# Patient Record
Sex: FEMALE | Race: WHITE | URBAN - METROPOLITAN AREA
[De-identification: names, ages, dates, MRNs, and addresses within clinical notes are randomized per-mention and may not be internally consistent; named-entity substitution may affect disease eponyms.]

---

## 2017-01-01 ENCOUNTER — TELEPHONE (OUTPATIENT)
Dept: GERIATRICS | Facility: CLINIC | Age: 82
End: 2017-01-01

## 2017-01-01 ENCOUNTER — NURSING HOME VISIT (OUTPATIENT)
Dept: GERIATRICS | Facility: CLINIC | Age: 82
End: 2017-01-01
Payer: COMMERCIAL

## 2017-01-01 ENCOUNTER — TRANSFERRED RECORDS (OUTPATIENT)
Dept: HEALTH INFORMATION MANAGEMENT | Facility: CLINIC | Age: 82
End: 2017-01-01

## 2017-01-01 VITALS
TEMPERATURE: 98.1 F | WEIGHT: 122.2 LBS | OXYGEN SATURATION: 96 % | DIASTOLIC BLOOD PRESSURE: 89 MMHG | SYSTOLIC BLOOD PRESSURE: 179 MMHG | HEART RATE: 74 BPM | BODY MASS INDEX: 21.65 KG/M2 | RESPIRATION RATE: 18 BRPM

## 2017-01-01 DIAGNOSIS — K92.2 UPPER GI BLEED: ICD-10-CM

## 2017-01-01 DIAGNOSIS — N18.4 CKD (CHRONIC KIDNEY DISEASE) STAGE 4, GFR 15-29 ML/MIN (H): ICD-10-CM

## 2017-01-01 DIAGNOSIS — F02.80 LATE ONSET ALZHEIMER'S DISEASE WITHOUT BEHAVIORAL DISTURBANCE (H): ICD-10-CM

## 2017-01-01 DIAGNOSIS — I10 BENIGN ESSENTIAL HYPERTENSION: ICD-10-CM

## 2017-01-01 DIAGNOSIS — E03.4 HYPOTHYROIDISM DUE TO ACQUIRED ATROPHY OF THYROID: Primary | ICD-10-CM

## 2017-01-01 DIAGNOSIS — G30.1 LATE ONSET ALZHEIMER'S DISEASE WITHOUT BEHAVIORAL DISTURBANCE (H): ICD-10-CM

## 2017-01-01 LAB
ANION GAP SERPL CALCULATED.3IONS-SCNC: 7 MMOL/L (ref 5–18)
BUN SERPL-MCNC: 40 MG/DL (ref 8–28)
CALCIUM SERPL-MCNC: 9.5 MG/DL (ref 8.5–10.5)
CHLORIDE SERPLBLD-SCNC: 105 MMOL/L (ref 98–107)
CO2 SERPL-SCNC: 25 MMOL/L (ref 22–31)
CREAT SERPL-MCNC: 1.68 MG/DL (ref 0.6–1.1)
GFR SERPL CREATININE-BSD FRML MDRD: 28 ML/MIN/1.73M2
GLUCOSE SERPL-MCNC: 112 MG/DL (ref 70–125)
POTASSIUM SERPL-SCNC: 4 MMOL/L (ref 3.5–5)
SODIUM SERPL-SCNC: 137 MMOL/L (ref 136–145)

## 2017-01-01 PROCEDURE — 99309 SBSQ NF CARE MODERATE MDM 30: CPT | Performed by: INTERNAL MEDICINE

## 2017-01-01 RX ORDER — FEXOFENADINE HCL 180 MG/1
180 TABLET ORAL EVERY 24 HOURS
COMMUNITY
End: 2018-01-01

## 2017-01-01 RX ORDER — PANTOPRAZOLE SODIUM 20 MG/1
20 TABLET, DELAYED RELEASE ORAL DAILY
COMMUNITY
End: 2018-01-01

## 2017-03-09 ENCOUNTER — TRANSFERRED RECORDS (OUTPATIENT)
Dept: HEALTH INFORMATION MANAGEMENT | Facility: CLINIC | Age: 82
End: 2017-03-09

## 2017-03-13 ENCOUNTER — TRANSFERRED RECORDS (OUTPATIENT)
Dept: FAMILY MEDICINE | Facility: CLINIC | Age: 82
End: 2017-03-13

## 2017-03-21 ENCOUNTER — HOSPITAL ENCOUNTER (INPATIENT)
Facility: CLINIC | Age: 82
LOS: 3 days | Discharge: NURSING, REHAB OR LONG-TERM CARE | DRG: 377 | End: 2017-03-24
Attending: EMERGENCY MEDICINE | Admitting: INTERNAL MEDICINE
Payer: MEDICARE

## 2017-03-21 ENCOUNTER — APPOINTMENT (OUTPATIENT)
Dept: CT IMAGING | Facility: CLINIC | Age: 82
DRG: 377 | End: 2017-03-21
Attending: EMERGENCY MEDICINE
Payer: MEDICARE

## 2017-03-21 ENCOUNTER — APPOINTMENT (OUTPATIENT)
Dept: GENERAL RADIOLOGY | Facility: CLINIC | Age: 82
DRG: 377 | End: 2017-03-21
Attending: EMERGENCY MEDICINE
Payer: MEDICARE

## 2017-03-21 DIAGNOSIS — K92.2 UPPER GI BLEED: Primary | ICD-10-CM

## 2017-03-21 DIAGNOSIS — K92.2 GI BLEED: ICD-10-CM

## 2017-03-21 LAB
ABO + RH BLD: NORMAL
ABO + RH BLD: NORMAL
ALBUMIN SERPL-MCNC: 2.6 G/DL (ref 3.4–5)
ALBUMIN UR-MCNC: 30 MG/DL
ALP SERPL-CCNC: 58 U/L (ref 40–150)
ALT SERPL W P-5'-P-CCNC: 11 U/L (ref 0–50)
ANION GAP SERPL CALCULATED.3IONS-SCNC: 11 MMOL/L (ref 3–14)
APPEARANCE UR: CLEAR
AST SERPL W P-5'-P-CCNC: 12 U/L (ref 0–45)
BASOPHILS # BLD AUTO: 0.1 10E9/L (ref 0–0.2)
BASOPHILS NFR BLD AUTO: 1 %
BILIRUB SERPL-MCNC: 0.6 MG/DL (ref 0.2–1.3)
BILIRUB UR QL STRIP: NEGATIVE
BLD GP AB SCN SERPL QL: NORMAL
BLD PROD TYP BPU: NORMAL
BLD PROD TYP BPU: NORMAL
BLD UNIT ID BPU: 0
BLOOD BANK CMNT PATIENT-IMP: NORMAL
BLOOD PRODUCT CODE: NORMAL
BPU ID: NORMAL
BUN SERPL-MCNC: 56 MG/DL (ref 7–30)
CALCIUM SERPL-MCNC: 7.8 MG/DL (ref 8.5–10.1)
CHLORIDE SERPL-SCNC: 104 MMOL/L (ref 94–109)
CO2 SERPL-SCNC: 21 MMOL/L (ref 20–32)
COLOR UR AUTO: YELLOW
CREAT SERPL-MCNC: 1.8 MG/DL (ref 0.52–1.04)
DIFFERENTIAL METHOD BLD: ABNORMAL
EOSINOPHIL # BLD AUTO: 0.2 10E9/L (ref 0–0.7)
EOSINOPHIL NFR BLD AUTO: 2.5 %
ERYTHROCYTE [DISTWIDTH] IN BLOOD BY AUTOMATED COUNT: 15.1 % (ref 10–15)
GFR SERPL CREATININE-BSD FRML MDRD: 26 ML/MIN/1.7M2
GLUCOSE SERPL-MCNC: 156 MG/DL (ref 70–99)
GLUCOSE UR STRIP-MCNC: NEGATIVE MG/DL
HCT VFR BLD AUTO: 22.6 % (ref 35–47)
HEMOCCULT STL QL: POSITIVE
HGB BLD-MCNC: 7.6 G/DL (ref 11.7–15.7)
HGB BLD-MCNC: 7.7 G/DL (ref 11.7–15.7)
HGB BLD-MCNC: 9.2 G/DL (ref 11.7–15.7)
HGB UR QL STRIP: NEGATIVE
IMM GRANULOCYTES # BLD: 0 10E9/L (ref 0–0.4)
IMM GRANULOCYTES NFR BLD: 0.3 %
INR PPP: 1.05 (ref 0.86–1.14)
INTERPRETATION ECG - MUSE: NORMAL
KETONES UR STRIP-MCNC: NEGATIVE MG/DL
LACTATE BLD-SCNC: 1 MMOL/L (ref 0.7–2.1)
LACTATE SERPL-SCNC: 2.7 MMOL/L (ref 0.4–2)
LEUKOCYTE ESTERASE UR QL STRIP: NEGATIVE
LYMPHOCYTES # BLD AUTO: 2.4 10E9/L (ref 0.8–5.3)
LYMPHOCYTES NFR BLD AUTO: 29.9 %
MCH RBC QN AUTO: 31.1 PG (ref 26.5–33)
MCHC RBC AUTO-ENTMCNC: 33.6 G/DL (ref 31.5–36.5)
MCV RBC AUTO: 93 FL (ref 78–100)
MONOCYTES # BLD AUTO: 0.9 10E9/L (ref 0–1.3)
MONOCYTES NFR BLD AUTO: 10.6 %
MUCOUS THREADS #/AREA URNS LPF: PRESENT /LPF
NEUTROPHILS # BLD AUTO: 4.5 10E9/L (ref 1.6–8.3)
NEUTROPHILS NFR BLD AUTO: 55.7 %
NITRATE UR QL: NEGATIVE
NRBC # BLD AUTO: 0 10*3/UL
NRBC BLD AUTO-RTO: 0 /100
NUM BPU REQUESTED: 1
PH UR STRIP: 6 PH (ref 5–7)
PLATELET # BLD AUTO: 260 10E9/L (ref 150–450)
POTASSIUM SERPL-SCNC: 4 MMOL/L (ref 3.4–5.3)
PROT SERPL-MCNC: 5.3 G/DL (ref 6.8–8.8)
RBC # BLD AUTO: 2.44 10E12/L (ref 3.8–5.2)
RBC #/AREA URNS AUTO: 1 /HPF (ref 0–2)
SODIUM SERPL-SCNC: 136 MMOL/L (ref 133–144)
SP GR UR STRIP: 1.01 (ref 1–1.03)
SPECIMEN EXP DATE BLD: NORMAL
TRANSFUSION STATUS PATIENT QL: NORMAL
TRANSFUSION STATUS PATIENT QL: NORMAL
TROPONIN I SERPL-MCNC: NORMAL UG/L (ref 0–0.04)
URN SPEC COLLECT METH UR: ABNORMAL
UROBILINOGEN UR STRIP-MCNC: NORMAL MG/DL (ref 0–2)
WBC # BLD AUTO: 8 10E9/L (ref 4–11)
WBC #/AREA URNS AUTO: 1 /HPF (ref 0–2)

## 2017-03-21 PROCEDURE — 25000125 ZZHC RX 250: Performed by: INTERNAL MEDICINE

## 2017-03-21 PROCEDURE — 85018 HEMOGLOBIN: CPT | Performed by: INTERNAL MEDICINE

## 2017-03-21 PROCEDURE — 99223 1ST HOSP IP/OBS HIGH 75: CPT | Mod: AI | Performed by: INTERNAL MEDICINE

## 2017-03-21 PROCEDURE — 96361 HYDRATE IV INFUSION ADD-ON: CPT

## 2017-03-21 PROCEDURE — 86900 BLOOD TYPING SEROLOGIC ABO: CPT | Performed by: EMERGENCY MEDICINE

## 2017-03-21 PROCEDURE — 83605 ASSAY OF LACTIC ACID: CPT | Performed by: INTERNAL MEDICINE

## 2017-03-21 PROCEDURE — 81001 URINALYSIS AUTO W/SCOPE: CPT | Performed by: EMERGENCY MEDICINE

## 2017-03-21 PROCEDURE — 70450 CT HEAD/BRAIN W/O DYE: CPT

## 2017-03-21 PROCEDURE — 86923 COMPATIBILITY TEST ELECTRIC: CPT | Performed by: EMERGENCY MEDICINE

## 2017-03-21 PROCEDURE — 86850 RBC ANTIBODY SCREEN: CPT | Performed by: EMERGENCY MEDICINE

## 2017-03-21 PROCEDURE — 36415 COLL VENOUS BLD VENIPUNCTURE: CPT | Performed by: INTERNAL MEDICINE

## 2017-03-21 PROCEDURE — 85025 COMPLETE CBC W/AUTO DIFF WBC: CPT | Performed by: EMERGENCY MEDICINE

## 2017-03-21 PROCEDURE — 85610 PROTHROMBIN TIME: CPT | Performed by: EMERGENCY MEDICINE

## 2017-03-21 PROCEDURE — 74176 CT ABD & PELVIS W/O CONTRAST: CPT

## 2017-03-21 PROCEDURE — 25000128 H RX IP 250 OP 636: Performed by: EMERGENCY MEDICINE

## 2017-03-21 PROCEDURE — 71010 XR CHEST PORT 1 VW: CPT

## 2017-03-21 PROCEDURE — 80053 COMPREHEN METABOLIC PANEL: CPT | Performed by: EMERGENCY MEDICINE

## 2017-03-21 PROCEDURE — 82272 OCCULT BLD FECES 1-3 TESTS: CPT | Performed by: EMERGENCY MEDICINE

## 2017-03-21 PROCEDURE — 96374 THER/PROPH/DIAG INJ IV PUSH: CPT

## 2017-03-21 PROCEDURE — 86901 BLOOD TYPING SEROLOGIC RH(D): CPT | Performed by: EMERGENCY MEDICINE

## 2017-03-21 PROCEDURE — 12000000 ZZH R&B MED SURG/OB

## 2017-03-21 PROCEDURE — 83605 ASSAY OF LACTIC ACID: CPT | Performed by: EMERGENCY MEDICINE

## 2017-03-21 PROCEDURE — P9016 RBC LEUKOCYTES REDUCED: HCPCS | Performed by: EMERGENCY MEDICINE

## 2017-03-21 PROCEDURE — 84484 ASSAY OF TROPONIN QUANT: CPT | Performed by: EMERGENCY MEDICINE

## 2017-03-21 PROCEDURE — 25000128 H RX IP 250 OP 636: Performed by: INTERNAL MEDICINE

## 2017-03-21 PROCEDURE — 93005 ELECTROCARDIOGRAM TRACING: CPT

## 2017-03-21 PROCEDURE — S0164 INJECTION PANTROPRAZOLE: HCPCS | Performed by: EMERGENCY MEDICINE

## 2017-03-21 PROCEDURE — 76705 ECHO EXAM OF ABDOMEN: CPT

## 2017-03-21 PROCEDURE — 25000125 ZZHC RX 250: Performed by: EMERGENCY MEDICINE

## 2017-03-21 PROCEDURE — 99285 EMERGENCY DEPT VISIT HI MDM: CPT | Mod: 25

## 2017-03-21 RX ORDER — LABETALOL HYDROCHLORIDE 5 MG/ML
10 INJECTION, SOLUTION INTRAVENOUS
Status: DISCONTINUED | OUTPATIENT
Start: 2017-03-21 | End: 2017-03-24 | Stop reason: HOSPADM

## 2017-03-21 RX ORDER — SODIUM CHLORIDE 9 MG/ML
INJECTION, SOLUTION INTRAVENOUS CONTINUOUS
Status: DISCONTINUED | OUTPATIENT
Start: 2017-03-21 | End: 2017-03-24 | Stop reason: HOSPADM

## 2017-03-21 RX ORDER — SODIUM CHLORIDE 9 MG/ML
1000 INJECTION, SOLUTION INTRAVENOUS CONTINUOUS
Status: DISCONTINUED | OUTPATIENT
Start: 2017-03-21 | End: 2017-03-21

## 2017-03-21 RX ORDER — NALOXONE HYDROCHLORIDE 0.4 MG/ML
.1-.4 INJECTION, SOLUTION INTRAMUSCULAR; INTRAVENOUS; SUBCUTANEOUS
Status: DISCONTINUED | OUTPATIENT
Start: 2017-03-21 | End: 2017-03-24 | Stop reason: HOSPADM

## 2017-03-21 RX ORDER — PROCHLORPERAZINE MALEATE 5 MG
5 TABLET ORAL EVERY 6 HOURS PRN
Status: DISCONTINUED | OUTPATIENT
Start: 2017-03-21 | End: 2017-03-24 | Stop reason: HOSPADM

## 2017-03-21 RX ORDER — HYDRALAZINE HYDROCHLORIDE 20 MG/ML
10 INJECTION INTRAMUSCULAR; INTRAVENOUS EVERY 4 HOURS PRN
Status: DISCONTINUED | OUTPATIENT
Start: 2017-03-21 | End: 2017-03-24 | Stop reason: HOSPADM

## 2017-03-21 RX ORDER — PROCHLORPERAZINE 25 MG
12.5 SUPPOSITORY, RECTAL RECTAL EVERY 12 HOURS PRN
Status: DISCONTINUED | OUTPATIENT
Start: 2017-03-21 | End: 2017-03-24 | Stop reason: HOSPADM

## 2017-03-21 RX ORDER — DOCUSATE SODIUM 100 MG/1
100 CAPSULE, LIQUID FILLED ORAL 2 TIMES DAILY
Status: DISCONTINUED | OUTPATIENT
Start: 2017-03-21 | End: 2017-03-24 | Stop reason: HOSPADM

## 2017-03-21 RX ORDER — BRIMONIDINE TARTRATE AND TIMOLOL MALEATE 2; 5 MG/ML; MG/ML
1 SOLUTION OPHTHALMIC 2 TIMES DAILY
Status: DISCONTINUED | OUTPATIENT
Start: 2017-03-21 | End: 2017-03-24 | Stop reason: HOSPADM

## 2017-03-21 RX ORDER — BRINZOLAMIDE 10 MG/ML
1 SUSPENSION/ DROPS OPHTHALMIC 2 TIMES DAILY
Status: DISCONTINUED | OUTPATIENT
Start: 2017-03-21 | End: 2017-03-24 | Stop reason: HOSPADM

## 2017-03-21 RX ORDER — TRAVOPROST OPHTHALMIC SOLUTION 0.04 MG/ML
1 SOLUTION OPHTHALMIC AT BEDTIME
Status: DISCONTINUED | OUTPATIENT
Start: 2017-03-21 | End: 2017-03-24 | Stop reason: HOSPADM

## 2017-03-21 RX ADMIN — SODIUM CHLORIDE 1000 ML: 9 INJECTION, SOLUTION INTRAVENOUS at 08:30

## 2017-03-21 RX ADMIN — SODIUM CHLORIDE 80 MG: 9 INJECTION, SOLUTION INTRAVENOUS at 09:53

## 2017-03-21 RX ADMIN — BRIMONIDINE TARTRATE AND TIMOLOL MALEATE 1 DROP: 2; 5 SOLUTION OPHTHALMIC at 21:49

## 2017-03-21 RX ADMIN — SODIUM CHLORIDE 8 MG/HR: 9 INJECTION, SOLUTION INTRAVENOUS at 09:57

## 2017-03-21 RX ADMIN — SODIUM CHLORIDE: 9 INJECTION, SOLUTION INTRAVENOUS at 12:29

## 2017-03-21 RX ADMIN — SODIUM CHLORIDE 1000 ML: 9 INJECTION, SOLUTION INTRAVENOUS at 09:10

## 2017-03-21 RX ADMIN — LABETALOL HYDROCHLORIDE 10 MG: 5 INJECTION, SOLUTION INTRAVENOUS at 17:45

## 2017-03-21 RX ADMIN — PANTOPRAZOLE SODIUM 40 MG: 40 INJECTION, POWDER, FOR SOLUTION INTRAVENOUS at 13:09

## 2017-03-21 RX ADMIN — TRAVOPROST OPHTHALMIC SOLUTION 1 DROP: 0.04 SOLUTION OPHTHALMIC at 21:26

## 2017-03-21 RX ADMIN — BRINZOLAMIDE 1 DROP: 10 SUSPENSION/ DROPS OPHTHALMIC at 21:31

## 2017-03-21 NOTE — IP AVS SNAPSHOT
[address] [facility]    [address] [place] [address]    Phone:  [phone]                                       After Visit Summary   3/21/2017    Milagros Marie    MRN: [id number]           After Visit Summary Signature Page     I have received my discharge instructions, and my questions have been answered. I have discussed any challenges I see with this plan with the nurse or doctor.    ..........................................................................................................................................  Patient/Patient Representative Signature      ..........................................................................................................................................  Patient Representative Print Name and Relationship to Patient    ..................................................               ................................................  Date                                            Time    ..........................................................................................................................................  Reviewed by Signature/Title    ...................................................              ..............................................  Date                                                            Time

## 2017-03-21 NOTE — IP AVS SNAPSHOT
"` `     [facility] [place] [address] [facility]: [phone]                                              INTERAGENCY TRANSFER FORM - NURSING   3/21/2017                    Hospital Admission Date: 3/21/2017  CHELLY RENAE   : 1923  Sex: Female        Attending Provider: Pb Woody MD     Allergies:  Sulfa Drugs    Infection:  None   Service:  HOSPITALIST    Ht:  1.6 m (5' 3\")   Wt:  56.3 kg (124 lb 3.2 oz)   Admission Wt:  56.3 kg (124 lb 3.2 oz)    BMI:  22 kg/m 2   BSA:  1.58 m 2            Patient PCP Information     Provider PCP Type    Maria Eugenia Holley MD General      Current Code Status     Date Active Code Status Order ID Comments User Context       3/21/2017 11:50 AM DNR/DNI [id number]  Pb Woody MD Inpatient       Code Status History     Date Active Date Inactive Code Status Order ID Comments User Context    This patient has a current code status but no historical code status.      Advance Directives        Does patient have a scanned Advance Directive/ACP document in EPIC?           Yes        Hospital Problems as of 3/24/2017              Priority Class Noted POA    Upper GI bleed Medium  3/21/2017 Yes      Non-Hospital Problems as of 3/24/2017              Priority Class Noted    Preventative health care   2011    Fatigue   2011    Osteopenia   2011    ACP (advance care planning)   Unknown    Renal failure   Unknown    Health Care Home   2013    Advanced directives, counseling/discussion   2015    Hypothyroidism due to acquired atrophy of thyroid Medium  10/6/2016    Benign essential hypertension Medium  10/6/2016      Immunizations     Name Date      Pneumococcal (PCV 13) 08/28/15     Pneumococcal 23 valent 00     TD (ADULT, 7+) 10/11/07          END      ASSESSMENT     Discharge Profile Flowsheet     EXPECTED DISCHARGE     Passing flatus  yes 17 0015    Expected Discharge Date  17 (Ann/Mana Joseph at 1530) 17 " "1113   COMMUNICATION ASSESSMENT      DISCHARGE NEEDS ASSESSMENT     Patient's communication style  spoken language (English or Bilingual) 03/21/17 0812    Concerns To Be Addressed  discharge planning concerns 03/23/17 0831   FINAL RESOURCES      Patient/family verbalizes understanding of discharge plan recommendations?  Yes 03/24/17 1113   Resources List  Transitional Care 03/24/17 1113    Medical Team notified of plan?  yes 03/24/17 1113   Other Resources  Day care;County Worker (Every Wednesday) 11/25/16 1948    Equipment Currently Used at Home  grab bar;shower chair;walker, rolling 03/22/17 1412   Transitional Care  -- ([facility]) 03/24/17 1113    Transportation Available  family or friend will provide 03/22/17 1412   PAS Number  [id number] 03/24/17 1113    # of Referrals Placed by CTS  Senior Linkage Line;Transportation 03/24/17 1113   Senior Linkage Line Referral Placed  03/23/17 03/24/17 1113    ASSESSMENT OF FUNCTIONAL STATUS     Referrals Placed  Post Acute Facilities;Senior Linkage Line;Transportation 03/24/17 1113    Assesssment of Functional Status  Needs placement in a SNF/TCF for rehabilitation 03/23/17 0831   SKIN      GASTROINTESTINAL (ADULT,PEDIATRIC,OB)     Inspection  Full 03/24/17 0906    GI WDL  WDL 03/24/17 0906   Skin WDL  ex 03/24/17 0906    All Quadrants Palpation  soft/nontender 03/24/17 0015   Skin Color/Characteristics  bruised (ecchymotic) 03/24/17 0906    Last Bowel Movement  03/22/17 03/23/17 1632   SAFETY      GI Signs/Symptoms  abdominal discomfort 03/24/17 0015   Safety WDL  WDL 03/24/17 0906                 Assessment WDL (Within Defined Limits) Definitions           Safety WDL     Effective: 09/28/15    Row Information: <b>WDL Definition:</b> Bed in low position, wheels locked; call light in reach; upper side rails up x 2; ID band on<br> <font color=\"gray\"><i>Item=AS safety wdl>>List=AS safety wdl>>Version=F14</i></font>      Skin WDL     Effective: 09/28/15    Row " "Information: <b>WDL Definition:</b> Warm; dry; intact; elastic; without discoloration; pressure points without redness<br> <font color=\"gray\"><i>Item=AS skin wdl>>List=AS skin wdl>>Version=F14</i></font>      Vitals     Vital Signs Flowsheet     VITAL SIGNS     Head of Bed (HOB)  HOB flat 03/23/17 2020    Temp  98.2  F (36.8  C) 03/24/17 0753   Body Position  other (see comments) (sitting) 03/24/17 0855    Temp src  Oral 03/24/17 0753   Chair  Upright in chair 03/24/17 0855    Resp  18 03/24/17 0753   Positioning/Transfer Devices  pillows 03/24/17 0855    Pulse  83 03/21/17 1536   DAILY CARE      Heart Rate  80 03/24/17 0753   Activity Level of Assistance  assistance, 1 person 03/24/17 0855    Pulse/Heart Rate Source  Monitor 03/23/17 2347   Activity Type  ambulated to bathroom;ambulated in room 03/24/17 0855    BP  139/80 03/24/17 0753   Activity Assistive Device  walker;gait belt 03/24/17 0855    BP Location  Left arm 03/23/17 2347   ECG      OXYGEN THERAPY     ECG Rhythm  Sinus rhythm 03/21/17 0844    SpO2  98 % 03/24/17 0753   Ectopy  None 03/21/17 0844    O2 Device  None (Room air) 03/24/17 0753   Lead Monitored  Lead II 03/21/17 0844    PAIN/COMFORT     EKG MONITORING      Patient Currently in Pain  denies 03/24/17 0855   Cardiac Regularity  Regular 03/21/17 0824    0-10 Pain Scale  0 03/21/17 0817   Cardiac Rhythm  Other (Comment) (sinus rhythm) 03/21/17 0824    HEIGHT AND WEIGHT     ALEJANDRO COMA SCALE      Height  1.6 m (5' 3\") 03/21/17 1149   Best Eye Response  4-->(E4) spontaneous 03/24/17 0906    Weight  56.3 kg (124 lb 3.2 oz) 03/21/17 1149   Best Motor Response  6-->(M6) obeys commands 03/24/17 0906    BSA (Calculated - sq m)  1.58 03/21/17 1149   Best Verbal Response  5-->(V5) oriented 03/24/17 0906    BMI (Calculated)  22.05 03/21/17 1149   [place] Coma Scale Score  15 03/24/17 0906    POSITIONING                   Patient Lines/Drains/Airways Status    Active LINES/DRAINS/AIRWAYS     Name: Placement " date: Placement time: Site: Days: Last dressing change:    Peripheral IV 03/23/17 Right;Posterior Upper arm 03/23/17   1440   Upper arm   less than 1             Patient Lines/Drains/Airways Status    Active PICC/CVC     None            Intake/Output Detail Report     Date Intake       Output Net    Shift P.O. I.V. IV Piggyback Blood Components Total Urine Total       Noc 03/22/17 2300 - 03/23/17 0659 100 800 -- -- 900 -- -- 900    Day 03/23/17 0700 - 03/23/17 1459 -- -- -- -- -- -- -- 0    Chloe 03/23/17 1500 - 03/23/17 2259 -- -- -- -- -- -- -- 0    Noc 03/23/17 2300 - 03/24/17 0659 -- -- -- -- -- -- -- 0    Day 03/24/17 0700 - 03/24/17 1459 -- -- -- -- -- -- -- 0      Last Void/BM       Most Recent Value    Urine Occurrence 1 at 03/24/2017 1245    Stool Occurrence 1 at 03/22/2017 0000      Case Management/Discharge Planning     Case Management/Discharge Planning Flowsheet     REFERRAL INFORMATION     COPING/STRESS CAREGIVER      Did the Initial Social Work Assessment result in a Social Work Case?  Yes 03/23/17 0831   Major Change/Loss/Stressor  none 03/23/17 0831    Admission Type  inpatient 03/23/17 0831   Primary Caregiver Strengths  expressive of needs;motivated;positive attitude;successful coping history 03/23/17 0831    Arrived From  home or self-care 03/23/17 0831   Sources Of Support  friend(s);other family members 03/23/17 0831    Referral Source  interdisciplinary rounds 03/23/17 0831   Reaction To Health Status  accepting 03/23/17 0831    # of Referrals Placed by [facility]  Senior Linkage Line;Transportation 03/24/17 1113   Understanding Of Condition And Treatment  adequate understanding of medical condition;adequate understanding of treatment 03/23/17 0831    Reason For Consult  discharge planning 03/23/17 0831   EXPECTED DISCHARGE      Record Reviewed  history and physical;medical record 03/23/17 0831   Expected Discharge Date  03/24/17 (Ann/Mana Joseph at 1530) 03/24/17 1113     Assigned to  Case  Tracy Doty, KLAUS 03/24/17 1113   ASSESSMENT/CONCERNS TO BE ADDRESSED      Primary Care MD Name  Maria Eugenia Holley MD 03/23/17 0831   Concerns To Be Addressed  discharge planning concerns 03/23/17 0831    LIVING ENVIRONMENT     DISCHARGE PLANNING      Lives With  child(agustin), adult 03/23/17 0831   Patient/family verbalizes understanding of discharge plan recommendations?  Yes 03/24/17 1113    Living Arrangements  condominium 03/23/17 0831   Medical Team notified of plan?  yes 03/24/17 1113    Provides Primary Care For  no one 03/23/17 0831   Transportation Available  family or friend will provide 03/22/17 1412    Quality Of Family Relationships  supportive;involved 03/23/17 0831   FINAL NOTE      Able to Return to Prior Living Arrangements  no 03/23/17 0831   Final Note  D/C to Ann/Mana Joseph on 3-24-17 via w/c at 1530 03/24/17 1113    HOME SAFETY     FINAL RESOURCES      Patient Feels Safe Living in Home?  yes 03/23/17 0831   Equipment Currently Used at Home  grab bar;shower chair;walker, rolling 03/22/17 1412    ASSESSMENT OF FAMILY/SOCIAL SUPPORT     Resources List  Transitional Care 03/24/17 1113    Who is your support system?  Children 03/23/17 0831   Other Resources  Day care;County Worker (Every Wednesday) 11/25/16 1948    Description of Support System  Supportive;Involved 03/23/17 0831   Transitional Care  -- (Ann/Mana Joseph) 03/24/17 1113    Support Assessment  Adequate family and caregiver support;Adequate social supports 03/23/17 0831   PAS Number  [id number] 03/24/17 1113    Quality of Family Relationships  supportive;involved 03/23/17 0831   Senior Linkage Line Referral Placed  03/23/17 03/24/17 1113    ASSESSMENT OF FUNCTIONAL STATUS     Referrals Placed  Post Acute Facilities;Senior Linkage Line;Transportation 03/24/17 1113    Assesssment of Functional Status  Needs placement in a SNF/TCF for rehabilitation 03/23/17 0831   ABUSE RISK SCREEN      EMPLOYMENT     QUESTION TO PATIENT:  Has a  member of your family or a partner(now or in the past) intimidated, hurt, manipulated, or controlled you in any way?  patient declined to answer or is unable to answer 03/21/17 0817    Do you work full or part-time?  no 03/23/17 0831   QUESTION TO PATIENT: Do you feel safe going back to the place where you are living?  patient declined to answer or is unable to answer 03/21/17 0817    COPING/STRESS     OBSERVATION: Is there reason to believe there has been maltreatment of a vulnerable adult (ie. Physical/Sexual/Emotional abuse, self neglect, lack of adequate food, shelter, medical care, or financial exploitation)?  no 03/21/17 0817    Major Change/Loss/Stressor  hospitalization 03/23/17 0831   (R) MENTAL HEALTH SUICIDE RISK      Patient Personal Strengths  strong support system 03/23/17 0831   Are you depressed or being treated for depression?  No 03/21/17 1221    Sources Of Support  adult child(agustin);friend(s);other family members 03/23/17 0831   HOMICIDE RISK      Reaction To Health Status  unable to assess 03/23/17 0831   Homicidal Ideation  no 03/21/17 0817    Understanding Of Condition And Treatment  unable to assess 03/23/17 0831

## 2017-03-21 NOTE — ED PROVIDER NOTES
History     Chief Complaint:  Syncope & Rectal Bleeding    HPI   HPI is limited secondary to the patient's history of dementia. History is supplemented by EMS and the patient's daughter.     Milagros Marie is a 89 year old female with a history of dementia, homocystinemia and renal failure who presents to the emergency department today via EMS and her daughter for evaluation of syncope and rectal bleeding this morning. EMS states that the patient felt fine yesterday, and was ambulating with her walker and acting like herself per the patient's daughter. EMS states that the patient got up this morning at the private residence that she resides at with her daughter, and was found on the ground between the toilet and bathtub by her daughter, prompting 911 to be called. EMS notes that upon arrival, the patient had a decreased level of consciousness and was unable to follow many commands. The patient was noted to have vital signs within normal limits, including a blood pressure of 114/66 and a pulse in the upper 60s-mid 70s alongside a normal blood sugar and 12-Lead. EMS mentions that there was bloody diarrhea found in the toilet, which was made this morning. The patient complains of abdominal pain according to EMS, but upon evaluation in the ED, the patient has no complaints of pain. Moreover, the patient states that she became incontinent of her urine. The patient denies chest pain and shortness of breath.       Allergies:  Sulfa Drugs      Medications:    Levothyroxine (synthroid/levothroid) 50 mcg tablet  Oxycodone (roxicodone) 5 mg immediate release tablet  Amlodipine besylate po  Loteprednol etabonate 0.5 % gel  Metoprolol (lopressor) 50 mg tablet  Azopt 1 % ophthalmic suspension  Cephalexin (keflex) 500 mg capsule  Irbesartan (avapro) 300 mg tablet  Probiotic product (advanced probiotic) caps  Aspirin 81 mg chewable tablet  Acetaminophen (acetaminophen extra strength) 500 mg tablet  Combigan 0.2-0.5 %  ophthalmic solution  Fexofenadine hcl (allegra po)  Travoprost z, benzalkonium, (travatan) 0.004 % ophthalmic solution  Magnesium 500 mg caps  Calcium carbonate-vitamin d (calcium 600 + d or)     Past Medical History:    ACP (advance care planning)   DJD (degenerative joint disease)   Essential hypertension, benign   Glaucoma   Homocysteinemia (H)   HTN (hypertension)   Osteopenia   Reflux   Renal failure   Stenosis   Stress incontinence, female   Unspecified hypothyroidism   Dementia     Past Surgical History:    Joint replacemtn, knee rt/lt   Hysterectomy   Carpal tunnel release rt/lt   Cataract iol, rt/lt   Blepharoplasty   Eye surgery   Bladder surgery     Family History:    Heart disease: mother, father   Colorectal cancer: sister   Cancer: sister     Social History:  The patient was accompanied to the ED by EMS and daughter.  Smoking Status: never  Smokeless Tobacco: never  Alcohol Use: positive  Marital Status:   [2]     Review of Systems   Unable to perform ROS: Dementia     Physical Exam   Vitals:  Patient Vitals for the past 24 hrs:   BP Temp Temp src Heart Rate Resp SpO2   03/21/17 1000 148/69 - - 79 - 98 %   03/21/17 0959 145/64 - - 71 - 99 %   03/21/17 0845 132/76 - - 76 10 99 %   03/21/17 0830 123/61 - - 77 17 99 %   03/21/17 0826 - - - 77 27 99 %   03/21/17 0825 121/69 - - - - -   03/21/17 0815 116/65 97.6  F (36.4  C) Oral 81 16 99 %        Physical Exam  General: Appears well-developed and well-nourished.   Head: No signs of trauma.   Mouth/Throat: Oropharynx is clear and moist.   Eyes: Conjunctivae are normal. Pupils are equal, round, and reactive to light.   Neck: Normal range of motion. No nuchal rigidity. No cervical adenopathy  CV: Normal rate and regular rhythm.    Resp: Effort normal and breath sounds normal. No respiratory distress.   GI: Soft. There is no tenderness or guarding.  Normal bowel sounds.  No CVA tenderness.  Rectal:  Melanotic stool present.  MSK: Normal range of motion.  no edema. No Calf tenderness.  Neuro: The patient is alert and oriented to person, place.  Poor historian, which daughter confirms dementia.  PERRLA, EOMI, strength in upper/lower extremities generally weak but symmetrical. Sensation normal. Speech normal.  Skin: Skin is warm and dry. No rash noted.   Psych: normal mood and affect. behavior is normal.     Emergency Department Course     ECG:  ECG taken at 0816, ECG read at 0816  Normal sinus rhythm   Left axis deviation   Incomplete right bundle branch block   Left ventricular hypertrophy with repolarization abnormality   Cannot rule out septal infarct, age undetermined   Abnormal ECG  Rate 79 bpm. IN interval 176. QRS duration 106. QT/QTc 396/454. P-R-T axes 21 -35 81.      Imaging:  Radiology findings were communicated with the patient who voiced understanding of the findings.    Head CT w/o contrast  IMPRESSION: Chronic changes. No evidence for intracranial hemorrhage  or any acute process.  Reading per radiology    Abd/pelvis CT no contrast - Stone Protocol  IMPRESSION:  1. No convincing acute abnormality is seen.  2. Stool distends the colon.  3. Cholelithiasis.  4. Moderate hiatal hernia is more distended than in 2009.  5. An indeterminate new hyperdense medial right renal lesion is  subcentimeter. This may just be a small hemorrhagic cyst. A solid  lesion is not completely excluded.  6. Nonspecific mild subcutaneous edema diffusely.  7. Diffuse vascular calcifications.  Reading per radiology    Chest  XR, 1 view PORTABLE  IMPRESSION: Moderate sized esophageal hiatal hernia. Minimal  interstitial infiltrate left lung base which may be acute or chronic.  No other findings.  Reading per radiology    POC US ABDOMEN LIMITED  [facility] Procedure Note      Limited Bedside ED Aorta Ultrasound:    PROCEDURE: PERFORMED BY: Dr. Nickolas Looney  INDICATIONS:  Abdominal Pain    PROBE:  Low frequency convex probe  BODY LOCATION: Abdomen  FINDINGS:  The  ultrasound was performed using transverse views.   Normal: Abdominal aorta < 4 cm.  MEASUREMENT:  2.6 cm   No evidence of free fluid in hepatorenal (Morison s pouch), perisplenic, or and pelvic areas. No evidence of pericardial effusion.  INTERPRETATION:  The evaluation of the aorta was of normal caliber (ie < 4cm in the transverse/longitudinal views) without evidence of aneurysm or dilation.  Aorta visualized in entirety from insertion into the intra-abdominal cavity to bifurcation into iliac vessels. There was no abdominal free fluid.  IMAGE DOCUMENTATION: Images were archived to PACs system.      Laboratory:  Laboratory findings were communicated with the patient who voiced understanding of the findings.    Occult blood stool: positive    CBC: HGB 7.6 (L) o/w WNL. (WBC 8.0, )   CMP: Creatinine 1.80 (H), glucose 156 (H), BUN 56 (H), GFR estimate 26 (L), calcium 7.8 (L), albumin 2.6 (L), protein total 5.3 (L) o/w WNL.   Troponin (Collected 0819): <0.015  INR: 1.05  ABO/Rh type and screen: O positive, negative for antibody   Lactic Acid (Collected at 0819): 2.7 (H)    UA: protein albumin urine 30 (A), mucous urine present (A) o/w WNL.       Interventions:  0830: NS 1000 mL IV   0830: NaCl infusion 1000 mL IV   0910: NS 1000 mL IV   0957: Protonix 8 mg/hr IV   0953: Protonix 80 mg IV        Emergency Department Course:  0811: The patient arrives via EMS.   0812: I performed an exam of the patient as documented above.   0816: EKG obtained in the ED, see results above.   0817: I spoke with the patient's daughter. The patient is confirmed to be DNR/DNI.  0819: IV was inserted and blood was drawn for laboratory testing, results above.  0830: I performed a bedside ultrasound on the patient as documented above.   The patient was sent for a CT head, XR chest, CT abdomen pelvis while in the emergency department, results above.   The patient provided a urine sample here in the emergency department. This was sent for  laboratory testing, findings above.  The patient provided a stool sample here in the emergency department. This was sent for laboratory testing, findings above.  1013: I spoke with Dr. Woody of the Hospitalist service from [facility] regarding patient's presentation, findings, and plan of care.  At 1015 the patient and her daughter was rechecked and was updated on the results of her laboratory and imaging studies.   I discussed the treatment plan with the patient. They expressed understanding of this plan and consented to admission. I discussed the patient with Dr. Woody, who will admit the patient to a monitored bed for further evaluation and treatment.  I personally reviewed the laboratory and imaging results with the Patient and daughter and answered all related questions prior to admission.    Impression & Plan      Medical Decision Making:  Milagros Marie is a 89 year old female who presents to the emergency department today after collapsing in the bathroom. She lives independently with her daughter, who heard her in the bathroom, went to check on her and found her between the toilet and the bath tub. EMS noted bloody stool in the toilet. Upon my evaluation, the patient denies specific complaints. She does have dementia and is a fairly poor historian overall. At certain times while obtaining the history she would complain of abdominal pain but primarily did not. On her exam, her abdomen was soft without any apparent tenderness. She did have obvious melena. The remainder of her exam was non-focal. Blood work was obtained which did show anemia. She has a history of CKD and her creatinine was near baseline. I did start her on a Protonix drip given the concern for GI bleed. I did not initiate transfusion given that her vital signs were stable and her hemoglobin was above 7. The patient remained stable throughout her ED course and will be admitted to the hospitalist service for further monitoring,  treatment and evaluation.       Diagnosis:    ICD-10-CM    1. GI bleed K92.2        Disposition:   The patient is admitted to the hospital.       Scribe Disclosure:  I, Andres Olmos, am serving as a scribe at 8:13 AM on 3/21/2017 to document services personally performed by Nickolas Looney MD, based on my observations and the provider's statements to me.    3/21/2017    EMERGENCY DEPARTMENT       Nickolas Looney MD  03/23/17 1700

## 2017-03-21 NOTE — PROVIDER NOTIFICATION
"Spoke to DR. Woody regarding this morning Lactic result, MD states: \"this possibly not r/t SIRS. But could be the GI bleed.\" we will recheck again today for follow up.   "

## 2017-03-21 NOTE — PHARMACY-ADMISSION MEDICATION HISTORY
Admission medication history interview status for the 3/21/2017  admission is complete. See EPIC admission navigator for prior to admission medications     Medication history source reliability:Good    Actions taken by pharmacist (provider contacted, etc):None     Additional medication history information not noted on PTA med list :None    Medication reconciliation/reorder completed by provider prior to medication history? yes    Time spent in this activity: 15 min    Prior to Admission medications    Medication Sig Last Dose Taking? Auth Provider   levothyroxine (SYNTHROID/LEVOTHROID) 50 MCG tablet TAKE 1 TABLET (50 MCG) BY MOUTH DAILY 3/20/2017 at am Yes Maria Eugenia Holley MD   AMLODIPINE BESYLATE PO Take 10 mg by mouth daily 3/20/2017 at 1200 Yes Unknown, Entered By History   metoprolol (LOPRESSOR) 50 MG tablet TAKE 1 AND 1/2 TABLET TWICE DAILY WITH FOOD OR MILK 3/20/2017 at pm Yes Monty Lloyd MD   AZOPT 1 % ophthalmic suspension Place 1 drop Into the left eye 2 times daily  3/20/2017 at pm Yes Reported, Patient   irbesartan (AVAPRO) 300 MG tablet Take 0.5 tablets (150 mg) by mouth 2 times daily 3/20/2017 at pm Yes Andrea Bennett MD   Probiotic Product (ADVANCED PROBIOTIC) CAPS Take 1 tablet by mouth daily (with lunch)  3/20/2017 at 1200 Yes Janice Castillo MD   aspirin 81 MG chewable tablet Take 1 tablet (81 mg) by mouth daily 3/20/2017 at pm Yes Janice Castillo MD   acetaminophen (ACETAMINOPHEN EXTRA STRENGTH) 500 MG tablet Take 500 mg by mouth 3 times daily  3/20/2017 at Unknown time Yes Janice Castillo MD   COMBIGAN 0.2-0.5 % ophthalmic solution Place 1 drop Into the left eye 2 times daily 3/20/2017 at pm Yes Reported, Patient   Fexofenadine HCl (ALLEGRA PO) Take 180 mg by mouth daily.  3/20/2017 at pm Yes Reported, Patient   travoprost Z, benzalkonium, (TRAVATAN) 0.004 % ophthalmic solution 1 drop. One drop in left eye at bedtime.  3/20/2017 at 2200  Yes Reported, Patient   Magnesium 500 MG CAPS Take 1 tablet by mouth daily Daughter does not know salt form 3/20/2017 at Unknown time Yes Reported, Patient   Calcium Carbonate-Vitamin D (CALCIUM 600 + D OR) Take 1 tablet by mouth daily  3/20/2017 at Unknown time Yes Reported, Patient   cephALEXin (KEFLEX) 500 MG capsule Take 2,000 mg by mouth as needed Administer one hour prior to dental procedure   Reported, Patient

## 2017-03-21 NOTE — IP AVS SNAPSHOT
"          [place] [address] [facility]: [phone]                                              INTERAGENCY TRANSFER FORM - LAB / IMAGING / EKG / EMG RESULTS   3/21/2017                    Hospital Admission Date: 3/21/2017  CHELLY RENAE   : 1923  Sex: Female        Attending Provider: Pb Woody MD     Allergies:  Sulfa Drugs    Infection:  None   Service:  HOSPITALIST    Ht:  1.6 m (5' 3\")   Wt:  56.3 kg (124 lb 3.2 oz)   Admission Wt:  56.3 kg (124 lb 3.2 oz)    BMI:  22 kg/m 2   BSA:  1.58 m 2            Patient PCP Information     Provider PCP Type    Maria Eugenia Holley MD General         Lab Results - 3 Days      Hemoglobin [[id number]] (Abnormal)  Resulted: 17 0616, Result status: Final result    Ordering provider: Alex Moses MD  17 0001 Resulting lab: [facility]    Specimen Information    Type Source Collected On   Blood  17 0558          Components       Value Reference Range Flag Lab   Hemoglobin 7.3 11.7 - 15.7 g/dL L FrStHsLb            Hemoglobin [[id number]] (Abnormal)  Resulted: 17, Result status: Final result    Ordering provider: Alex Moses MD  17 1520 Resulting lab: [facility]    Specimen Information    Type Source Collected On   Blood  17 2000          Components       Value Reference Range Flag Lab   Hemoglobin 7.9 11.7 - 15.7 g/dL L FrStHsLb            Basic metabolic panel [[id number]] (Abnormal)  Resulted: 17 0824, Result status: Final result    Ordering provider: Pb Woody MD  17 0001 Resulting lab: [facility]    Specimen Information    Type Source Collected On   Blood  17 0746          Components       Value Reference Range Flag Lab   Sodium 141 133 - 144 mmol/L  FrStHsLb   Potassium 3.8 3.4 - 5.3 mmol/L  FrStHsLb   Chloride 112 94 - 109 mmol/L H FrStHsLb   Carbon Dioxide 21 20 - 32 mmol/L  FrStHsLb   Anion Gap 8 3 - 14 " mmol/L  FrStHsLb   Glucose 93 70 - 99 mg/dL  FrStHsLb   Urea Nitrogen 56 7 - 30 mg/dL H FrStHsLb   Creatinine 1.56 0.52 - 1.04 mg/dL H FrStHsLb   GFR Estimate 31 >60 mL/min/1.7m2 L FrStHsLb   Comment:  Non  GFR Calc   GFR Estimate If Black 37 >60 mL/min/1.7m2 L FrStHsLb   Comment:  African American GFR Calc   Calcium 8.0 8.5 - 10.1 mg/dL L FrStHsLb            CBC with platelets [[id number]] (Abnormal)  Resulted: 03/22/17 0809, Result status: Final result    Ordering provider: Pb Woody MD  03/22/17 0001 Resulting lab: [facility]    Specimen Information    Type Source Collected On   Blood  03/22/17 0746          Components       Value Reference Range Flag Lab   WBC 6.1 4.0 - 11.0 10e9/L  FrStHsLb   RBC Count 2.32 3.8 - 5.2 10e12/L L FrStHsLb   Hemoglobin 7.4 11.7 - 15.7 g/dL L FrStHsLb   Hematocrit 21.6 35.0 - 47.0 % L FrStHsLb   MCV 93 78 - 100 fl  FrStHsLb   MCH 31.9 26.5 - 33.0 pg  FrStHsLb   MCHC 34.3 31.5 - 36.5 g/dL  FrStHsLb   RDW 15.5 10.0 - 15.0 % H FrStHsLb   Platelet Count 188 150 - 450 10e9/L  FrStHsLb            Hemoglobin [[id number]] (Abnormal)  Resulted: 03/22/17 0211, Result status: Final result    Ordering provider: Pb Woody MD  03/21/17 2000 Resulting lab: [facility]    Specimen Information    Type Source Collected On   Blood  03/22/17 0205          Components       Value Reference Range Flag Lab   Hemoglobin 7.9 11.7 - 15.7 g/dL L FrStHsLb            Hemoglobin [[id number]] (Abnormal)  Resulted: 03/21/17 2329, Result status: Final result    Ordering provider: Pb Woody MD  03/21/17 1600 Resulting lab: [facility]    Specimen Information    Type Source Collected On   Blood  03/21/17 8525          Components       Value Reference Range Flag Lab   Hemoglobin 7.7 11.7 - 15.7 g/dL L FrStHsLb            Lactic acid whole blood [[id number]]  Resulted: 03/21/17 1724, Result status: Final result    Ordering  provider: Pb Woody MD  03/21/17 1534 Resulting lab: [facility]    Specimen Information    Type Source Collected On   Blood  03/21/17 1700          Components       Value Reference Range Flag Lab   Lactic Acid 1.0 0.7 - 2.1 mmol/L  FrStHsLb            Hemoglobin [[id number]] (Abnormal)  Resulted: 03/21/17 1720, Result status: Final result    Ordering provider: Pb Woody MD  03/21/17 1150 Resulting lab: [facility]    Specimen Information    Type Source Collected On   Blood  03/21/17 1700          Components       Value Reference Range Flag Lab   Hemoglobin 9.2 11.7 - 15.7 g/dL L FrStHsLb            UA with Microscopic [[id number]] (Abnormal)  Resulted: 03/21/17 0903, Result status: Final result    Ordering provider: Nickolas Looney MD  03/21/17 0824 Resulting lab: [facility]    Specimen Information    Type Source Collected On   Urine Urine catheter 03/21/17 0855          Components       Value Reference Range Flag Lab   Color Urine Yellow   FrStHsLb   Appearance Urine Clear   FrStHsLb   Glucose Urine Negative NEG mg/dL  FrStHsLb   Bilirubin Urine Negative NEG  FrStHsLb   Ketones Urine Negative NEG mg/dL  FrStHsLb   Specific [place] Urine 1.012 1.003 - 1.035  FrStHsLb   Blood Urine Negative NEG  FrStHsLb   pH Urine 6.0 5.0 - 7.0 pH  FrStHsLb   Protein Albumin Urine 30 NEG mg/dL A FrStHsLb   Urobilinogen mg/dL Normal 0.0 - 2.0 mg/dL  FrStHsLb   Nitrite Urine Negative NEG  FrStHsLb   Leukocyte Esterase Urine Negative NEG  FrStHsLb   Source Catheterized Urine   FrStHsLb   WBC Urine 1 0 - 2 /HPF  FrStHsLb   RBC Urine 1 0 - 2 /HPF  FrStHsLb   Mucous Urine Present NEG /LPF A FrStHsLb            Troponin I [[id number]]  Resulted: 03/21/17 0854, Result status: Final result    Ordering provider: Nickolas Looney MD  03/21/17 0821 Resulting lab: [facility]    Specimen Information    Type Source Collected On   Blood  03/21/17 0819           Components       Value Reference Range Flag Lab   Troponin I ES -- 0.000 - 0.045 ug/L  FrStHsLb   Result:         <0.015  The 99th percentile for upper reference range is 0.045 ug/L.  Troponin values in   the range of 0.045 - 0.120 ug/L may be associated with risks of adverse   clinical events.              Comprehensive metabolic panel [[id number]] (Abnormal)  Resulted: 03/21/17 0853, Result status: Final result    Ordering provider: Nickolas Looney MD  03/21/17 0821 Resulting lab: [facility]    Specimen Information    Type Source Collected On   Blood  03/21/17 0819          Components       Value Reference Range Flag Lab   Sodium 136 133 - 144 mmol/L  FrStHsLb   Potassium 4.0 3.4 - 5.3 mmol/L  FrStHsLb   Chloride 104 94 - 109 mmol/L  FrStHsLb   Carbon Dioxide 21 20 - 32 mmol/L  FrStHsLb   Anion Gap 11 3 - 14 mmol/L  FrStHsLb   Glucose 156 70 - 99 mg/dL H FrStHsLb   Urea Nitrogen 56 7 - 30 mg/dL H FrStHsLb   Creatinine 1.80 0.52 - 1.04 mg/dL H FrStHsLb   GFR Estimate 26 >60 mL/min/1.7m2 L FrStHsLb   Comment:  Non  GFR Calc   GFR Estimate If Black 32 >60 mL/min/1.7m2 L FrStHsLb   Comment:  African American GFR Calc   Calcium 7.8 8.5 - 10.1 mg/dL L FrStHsLb   Bilirubin Total 0.6 0.2 - 1.3 mg/dL  FrStHsLb   Albumin 2.6 3.4 - 5.0 g/dL L FrStHsLb   Protein Total 5.3 6.8 - 8.8 g/dL L FrStHsLb   Alkaline Phosphatase 58 40 - 150 U/L  FrStHsLb   ALT 11 0 - 50 U/L  FrStHsLb   AST 12 0 - 45 U/L  FrStHsLb            INR [[id number]]  Resulted: 03/21/17 0842, Result status: Final result    Ordering provider: Nickolas Looney MD  03/21/17 0821 Resulting lab: [facility]    Specimen Information    Type Source Collected On   Blood  03/21/17 0819          Components       Value Reference Range Flag Lab   INR 1.05 0.86 - 1.14  [facility]            Lactic acid [[id number]] (Abnormal)  Resulted: 03/21/17 0837, Result status: Final result    Ordering provider: Nickolas Looney,  MD  03/21/17 0821 Resulting lab: [facility]    Specimen Information    Type Source Collected On   Blood  03/21/17 0819          Components       Value Reference Range Flag Lab   Lactic Acid 2.7 0.4 - 2.0 mmol/L H FrStHsLb            CBC with platelets differential [[id number]] (Abnormal)  Resulted: 03/21/17 0834, Result status: Final result    Ordering provider: Nickolas Looney MD  03/21/17 0821 Resulting lab: [facility]    Specimen Information    Type Source Collected On   Blood  03/21/17 0819          Components       Value Reference Range Flag Lab   WBC 8.0 4.0 - 11.0 10e9/L  FrStHsLb   RBC Count 2.44 3.8 - 5.2 10e12/L L FrStHsLb   Hemoglobin 7.6 11.7 - 15.7 g/dL L FrStHsLb   Hematocrit 22.6 35.0 - 47.0 % L FrStHsLb   MCV 93 78 - 100 fl  FrStHsLb   MCH 31.1 26.5 - 33.0 pg  FrStHsLb   MCHC 33.6 31.5 - 36.5 g/dL  FrStHsLb   RDW 15.1 10.0 - 15.0 % H FrStHsLb   Platelet Count 260 150 - 450 10e9/L  FrStHsLb   Diff Method Automated Method   FrStHsLb   % Neutrophils 55.7 %  FrStHsLb   % Lymphocytes 29.9 %  FrStHsLb   % Monocytes 10.6 %  FrStHsLb   % Eosinophils 2.5 %  FrStHsLb   % Basophils 1.0 %  FrStHsLb   % Immature Granulocytes 0.3 %  FrStHsLb   Nucleated RBCs 0 0 /100  FrStHsLb   Absolute Neutrophil 4.5 1.6 - 8.3 10e9/L  FrStHsLb   Absolute Lymphocytes 2.4 0.8 - 5.3 10e9/L  FrStHsLb   Absolute Monocytes 0.9 0.0 - 1.3 10e9/L  FrStHsLb   Absolute Eosinophils 0.2 0.0 - 0.7 10e9/L  FrStHsLb   Absolute Basophils 0.1 0.0 - 0.2 10e9/L  FrStHsLb   Abs Immature Granulocytes 0.0 0 - 0.4 10e9/L  FrStHsLb   Absolute Nucleated RBC 0.0   FrStHsLb            Occult blood stool [[id number]] (Abnormal)  Resulted: 03/21/17 0829, Result status: Final result    Ordering provider: Nickolas Looney MD  03/21/17 0822 Resulting lab: [facility]    Specimen Information    Type Source Collected On   Stool  03/21/17 0822          Components       Value Reference Range Flag Lab   Occult  Blood Positive NEG A FrStHsLb            Testing Performed By     Lab - Abbreviation Name Director Address Valid Date Range    14 - FrStHsLb [facility] Unknown [address] [place] [address] 05/08/15 1057 - Present            Unresulted Labs (24h ago through future)    Start       Ordered    03/24/17 0600  CBC with platelets  AM DRAW,   Routine     Comments:  Last Lab Result: Hemoglobin (g/dL)       Date                     Value                 03/23/2017               7.3 (L)          ----------    03/23/17 1710         Imaging Results - 3 Days      Abd/pelvis CT no contrast - Stone Protocol [[id number]]  Resulted: 03/21/17 1524, Result status: Final result    Ordering provider: Nickolas Looney MD  03/21/17 0900 Resulted by: Nick Junior MD    Performed: 03/21/17 0923 - 03/21/17 0935 Resulting lab: RADIOLOGY RESULTS    Narrative:       CT ABDOMEN AND PELVIS WITHOUT CONTRAST  3/21/2017 9:35 AM     HISTORY: Abdomen pain and melena.  Chronic kidney disease.    TECHNIQUE: Noncontrast CT abdomen and pelvis was performed. Radiation  dose for this scan was reduced using automated exposure control,  adjustment of the mA and/or kV according to patient size, or iterative  reconstruction technique.    COMPARISON: CT abdomen and pelvis 6/12/2009.    FINDINGS: There is a moderate hiatal hernia that is more distended  than the prior exam. Left renal atrophy. A hyperdense lesion at the  anterior medial right mid kidney is 0.6 cm and appears new on image  24. There is a cyst posteriorly at this same image, and likely other  very ill-defined right renal cysts. Cholelithiasis. There is no acute  abnormality involving the liver, spleen, adrenals, or pancreas  identified at unenhanced scanning. There is a hepatic cyst again  identified, image 25, at the anterior lower liver. No hydronephrosis.  No evidence for bowel obstruction. Stool distends the rectosigmoid  colon. Distal colonic diverticulosis  without diverticulitis. There is  also moderate stool distending the more proximal colon. Vascular  calcifications. There is no abscess or free air identified. There is a  degree of subcutaneous edema.      Impression:       IMPRESSION:  1. No convincing acute abnormality is seen.  2. Stool distends the colon.  3. Cholelithiasis.  4. Moderate hiatal hernia is more distended than in 2009.  5. An indeterminate new hyperdense medial right renal lesion is  subcentimeter. This may just be a small hemorrhagic cyst. A solid  lesion is not completely excluded.  6. Nonspecific mild subcutaneous edema diffusely.  7. Diffuse vascular calcifications.    MANPREET WATKINS MD      Head CT w/o contrast [[id number]]  Resulted: 03/21/17 1301, Result status: Final result    Ordering provider: Nickolas Looney MD  03/21/17 0843 Resulted by: Mina Serra MD    Performed: 03/21/17 0923 - 03/21/17 0935 Resulting lab: RADIOLOGY RESULTS    Narrative:       CT SCAN OF THE HEAD WITHOUT CONTRAST March 21, 2017 9:35 AM     HISTORY: Altered mentation, syncope.    TECHNIQUE:  Axial images of the head and coronal reformations without  IV contrast material. Radiation dose for this scan was reduced using  automated exposure control, adjustment of the mA and/or kV according  to patient size, or iterative reconstruction technique.    COMPARISON: MR dated 10/9/2015.    FINDINGS: Mild to moderate cerebral atrophy is present. There are some  minimal patchy white matter changes in both hemispheres without mass  effect. There is no evidence for intracranial hemorrhage, mass effect,  acute infarct, or skull fracture.      Impression:       IMPRESSION: Chronic changes. No evidence for intracranial hemorrhage  or any acute process.    MINA SERRA MD      Chest  XR, 1 view PORTABLE [[id number]]  Resulted: 03/21/17 0911, Result status: Final result    Ordering provider: Nickolas Looney MD  03/21/17 0843 Resulted by: Rolando Pettit MD    Performed:  03/21/17 0845 - 03/21/17 0903 Resulting lab: RADIOLOGY RESULTS    Narrative:       XR CHEST PORT 1 VW 3/21/2017 9:11 AM    HISTORY: syncope      Impression:       IMPRESSION: Moderate sized esophageal hiatal hernia. Minimal  interstitial infiltrate left lung base which may be acute or chronic.  No other findings.    KRISTIN HARRIS MD      POC US ABDOMEN LIMITED [[id number]]  Resulted: 03/21/17 0824, Result status: Final result    Ordering provider: Nickolas Looney MD  03/21/17 0824 Performed: 03/21/17 0824 - 03/21/17 0824    Resulting lab: RADIANT POCUS      Impression:       [facility] Procedure Note      Limited Bedside ED Aorta Ultrasound:    PROCEDURE: PERFORMED BY: Dr. Nickolas Looney  INDICATIONS:  Abdominal Pain    PROBE:  Low frequency convex probe  BODY LOCATION: Abdomen  FINDINGS:  The ultrasound was performed using transverse views.   Normal: Abdominal aorta < 4 cm.  MEASUREMENT:  2.6 cm   No evidence of free fluid in hepatorenal (Morison s pouch), perisplenic, or and pelvic areas. No evidence of pericardial effusion.  INTERPRETATION:  The evaluation of the aorta was of normal caliber (ie < 4cm in the transverse/longitudinal views) without evidence of aneurysm or dilation.  Aorta visualized in entirety from insertion into the intra-abdominal cavity to bifurcation into iliac vessels. There was no abdominal free fluid.  IMAGE DOCUMENTATION: Images were archived to PACs system.        Testing Performed By     Lab - Abbreviation Name Director Address Valid Date Range    104 - Rad Rslts RADIOLOGY RESULTS Unknown Unknown 02/16/05 1553 - Present    1735 - RADIANT POCUS RADIANT POCUS Unknown Unknown 05/12/15 0832 - Present            Encounter-Level Documents:     There are no encounter-level documents.      Order-Level Documents:     There are no order-level documents.

## 2017-03-21 NOTE — ED NOTES
[facility]  ED Nurse Handoff Report    ED Chief complaint: Rectal Bleeding (Patient brought in by EMS from home. Patient lives at home with daughter. Patient woke up, used bathroom and had decreased LOC on toilet. Per EMS patient had blood in the stool. Complains of poain when abdomen is palpated. )      ED Diagnosis:   Final diagnoses:   None       Code Status: Full Code    Allergies:   Allergies   Allergen Reactions     Sulfa Drugs        Activity level:  Total Care     Needed?: No    Isolation: No  Infection: Not Applicable    Bariatric?: No      Vital Signs:   Vitals:    03/21/17 0830 03/21/17 0845 03/21/17 0959 03/21/17 1000   BP: 123/61 132/76 145/64    Resp: 17 10     Temp:       TempSrc:       SpO2: 99% 99% 99% 98%       Cardiac Rhythm: ,   Cardiac  Cardiac Rhythm: Other (Comment) (sinus rhythm)  Ectopy: None    Pain level: 0-10 Pain Scale: 0    Is this patient confused?: Yes    Patient Report: Initial Complaint: Fall  Focused Assessment: pt had fallen in bathroom between toilet and bath tub at home. Pt was found to have occult stool, GI bleed.   Tests Performed: CT head, abd, labs, EKG  Abnormal Results: see results  Treatments provided: protonix gtt    Family Comments: daughter at bedside    OBS brochure/video discussed/provided to patient: N/A    ED Medications:   Medications   0.9% sodium chloride infusion (1,000 mLs Intravenous New Bag 3/21/17 0910)   pantoprazole (PROTONIX) 80 mg in NaCl 0.9 % 100 mL infusion (8 mg/hr Intravenous New Bag 3/21/17 0957)   0.9% sodium chloride BOLUS (0 mLs Intravenous Stopped 3/21/17 0910)   pantoprazole (PROTONIX) 80 mg in NaCl 0.9 % 100 mL intermittent infusion (80 mg Intravenous New Bag 3/21/17 0953)       Drips infusing?:  Yes      ED NURSE PHONE NUMBER: [phone]

## 2017-03-21 NOTE — H&P
DATE OF SERVIVCE:  2017      PRIMARY CARE PHYSICIAN:  Maria Eugenia Holley MD      CODE STATUS:  DNR/DNI      CHIEF COMPLAINT:  Altered mental status and melena.      HISTORY OF PRESENT ILLNESS:  Ms. Milagros Marie is a very pleasant 89-year-old female.  She has dementia and also history of chronic kidney disease, high blood pressure and hypothyroidism.  The history was obtained from the patient's daughter and from ED physician.  The patient's dementia is fairly significant and she is unfortunately not a very good historian.  She can answer some basic questions about her symptoms but that is about it.  The daughter tells me that she has been on aspirin since  for suspected stroke or TIA.  They did not recommend anything more aggressive than 81 mg a day aspirin so she has a really good heart.  .   No problems with arrhythmias and is not on any other anticoagulation and has not regularly been taking NSAIDs.  She does regularly take Tylenol though for arthritis.  The patient was at baseline up until this morning.  She normally has to get around with a wheeled walker.  She does live with her daughter.  The patient's  is .  The daughter noted her this morning to be slumping over the toilet and not very responsive, so she activated EMS.  When EMS came to evaluate her they noted some dark tarry stools in the toilet what looked like melena.  Her on the spot vitals were stable.  EKG was stable as was her blood sugar but they brought her in for further evaluation.  In the ED her workup was essentially negative with the exception of a hemoglobin that has dropped several points since last checked in 2016.  It was 11 and today it is about 7.6.  The patient and daughter do acknowledge that she has recently had constipation, but this morning she had a couple loose bowel movements and they do look dark and tarry.  The patient is denying any pain at present.  The daughter says that she was complaining of a  little bit of abdominal discomfort over the past couple of days but nothing significant and thought it was mostly just constipation and she has not been taking anything but has been advised to take MiraLax.  Blood pressure is stable.  She has received two 1 liter boluses in the Emergency Department, but they have not transfused her as her hemoglobin is above 7.  I will be admitting her for what is suspected to be an upper gastrointestinal bleed secondary to aspirin use.  She has no other complaints at this time.      ALLERGIES:  Sulfa drugs cause a rash.  ACE inhibitors cause cough.      HOME MEDICATIONS:   1.  Levothyroxine 50 mcg by mouth daily.   2.  Oxycodone 2.5 mg q.6 h. p.r.n.   3.  Amlodipine 10 mg daily.   4.  Lopressor of 75 mg twice daily.   5.  Azopt 1% ophthalmic solution left eye 2 times daily.   6.  Keflex 2000 mg by mouth as needed an hour before dental procedures.   7.  Irbesartan 150 mg twice a day.   8.  Probiotic 1 tablet daily.   9.  Aspirin 81 mg a day.   10.  Tylenol Extra Strength 500 mg twice a day.   11.  Combigan 0.5% ophthalmic solution left eye 2 times a day.   12.  Allegra 180 mg a day.   13.  Magnesium 500 mg a day.   14.  Calcium carbonate with vitamin D 1 tablet daily.      PAST MEDICAL HISTORY:   1.  Hypothyroidism.   2.  Stress incontinence.   3.  Renal failure, stage IV.   4.  GERD.   5.  Osteopenia.   6.  High blood pressure.   7.  Homocystinemia.   8.  Glaucoma.   9.  Degenerative joint disease.      PAST SURGICAL HISTORY:  Bilateral knee replacement, hysterectomy, cataract removal, bilateral carpal tunnel release, bilateral blepharoplasty and bladder surgery.      FAMILY HISTORY:  Significant for mother and father with heart disease and a sister with colorectal cancer all .      SOCIAL HISTORY:  She has never used tobacco.  Drinks wine very rarely.  No illicit drug history.  She is , retired, lives with daughter.      REVIEW OF SYSTEMS:  A 10-system review  conducted with patient and other than those systems listed in history of present illness are negative.      PHYSICAL EXAMINATION:   VITAL SIGNS:  Blood pressure 148/69, O2 sats 98% on room air, respiratory rate 10.  Heart rate 79, temperature 97.6 degrees Fahrenheit taken orally.   GENERAL:  Well-nourished appearing elderly female.  She is in no apparent distress.  She is alert, she is oriented to person.  Afebrile.   HEENT:  Normocephalic, atraumatic.  No oropharyngeal erythema.   NECK:  No cervical lymphadenopathy.  No thyromegaly.   RESPIRATORY:  Lungs clear to auscultation bilaterally normal, no wheezes or rhonchi.   CARDIOVASCULAR:  Normal S1, S2, no S3, S4, regular rate and rhythm, no murmurs, rubs or gallops, 2+ pulses palpable in all 4 extremities.   ABDOMEN:  Normal bowel sounds.  Abdomen is soft, nontender, nondistended.  No hepatosplenomegaly or mass palpable.   EXTREMITIES:  No clubbing, cyanosis or edema.   NEUROLOGIC:  Cranial nerves II-XII are intact, 5/5 strength in all 4 extremities, sensation intact diffusely, normal coordination by bilateral finger-nose testing.      LABORATORY DATA:  On complete metabolic profile, creatinine is at 1.8.  She is normally at 1.5, BUN is also elevated at 56, calcium is low at 7.8, albumin is low at 2.6, total protein is low at 5.3, all other values on CMP are normal.  Other labs, lactic acid is elevated at 2.7.  She is positive for occult blood in her stool.  Troponin is less than 0.015.  CBC pertinent positives are hemoglobin 7.6.  Her baseline is about 11 to 13 since 2015, all other values are normal.      IMAGING STUDIES:  Chest x-ray portable, 1 view shows moderately sized esophageal hiatal hernia, minimal interstitial infiltrate left lung base which may be acute or chronic.  No other findings.  Head CT without contrast shows chronic changes, no evidence for intracranial hemorrhage or any other process.  CT of the abdomen and pelvis without contrast shows no  convincing acute abnormality seen.  Stool distends the colon.  Cholelithiasis moderate hiatal hernia is more distended than in 2009 and remains indeterminate.  Hyperdense medial right renal lesion is subcentimeter; this may be a small hemorrhagic cyst; the solid lesion is not completely excluded.  Nonspecific mild subcutaneous edema, diffuse vascular calcifications.      ASSESSMENT:  This is a 89-year-old female with end-stage renal disease, osteopenia, hypertension, hypothyroidism and dementia who is presenting today after being found with altered mental status and melena and hemoglobin is several points lower than baseline suggesting acute blood loss.  Also, she has acute on chronic renal insufficiency and lactic acidosis suggestive of acute blood loss versus hypovolemia should be admitted for blood transfusion and GI consult and stabilization.      PLAN:   1.  Melena secondary to suspected upper GI bleed.  The patient has a history of daily aspirin use but no other anticoagulation.  I will hold aspirin and admitted her to transfuse 1 unit of PRBCs and keep her on maintenance IV fluids for now.  We will have GI consult per daughter's request, although it seems like the daughter is leaning away from  intervention and I am not so sure an EGD would be tolerated, given her advanced age and dementia that need for anesthesia, and also the fact that she has a hiatal hernia, but I will defer that decision ultimately to the decision between the gastroenterologist and the daughter.  We will do q.4h. hemoglobins throughout the day today and into the evening to make sure that hemoglobin remained stable after transfusion and I have also recommended the daughter to probably just permanently withhold aspirin at this point as it seems to be higher risk than benefit     2.  History of hypertension.  At this time, we will hold all of her p.o. meds.  She may be needing an EGD and I do not want her blood pressure to drop too low.  At  present it is quite stable.  She did get 2 liters in the Emergency Department and we will see what the plan is regarding any kind of workup but I suspect there probably will not be an EGD and if that is the case, I am more than happy to resume her blood pressure meds tonight or in the morning.   3.  History of hypothyroidism.  I am currently holding her oral levothyroxine.  She takes 50 mcg a day.  If it looks like she is going to be n.p.o. for an extended period of time I will put her on 25 mcg of IV levothyroxine.   4.  GERD.   We will put her on IV Protonix for the upper GI bleed and this should help cover GERD.   5.  DVT prophylaxis; withholding anticoagulation due to GI bleed would only use PCDs while in bed.   6.  Code status:  Daughter confirms DNR/DNI.      DISPOSITION:  Anticipate likely 48 hours for stabilization of her acute blood loss anemia and GI bleeding.         MOLLY RUBIO MD             D: 2017 10:55   T: 2017 11:30   MT:       Name:     CHELLY RENAE   MRN:      [id number]        Account:      [id number]   :      1923           Admitted:     [id number]      Document: [id number]       cc: Maria Eugenia Holley MD

## 2017-03-21 NOTE — IP AVS SNAPSHOT
[place] [id number] [facility]: [phone]            Medication Administration Report for Milagros Marie as of 03/24/17 1351   Legend:    Given Hold Not Given Due Canceled Entry Other Actions    Time Time (Time) Time  Time-Action       Inactive    Active    Linked        Medications 03/18/17 03/19/17 03/20/17 03/21/17 03/22/17 03/23/17 03/24/17    0.9% sodium chloride infusion  Rate: 50 mL/hr Freq: CONTINUOUS Route: IV  Last Dose: Stopped (03/23/17 1850)  Start: 03/21/17 1200       1229 ( )-New Bag        0821 ( )-New Bag       2048 ( )-New Bag        0635 ( )-New Bag       1850-Hold [C]            amLODIPine (NORVASC) tablet 10 mg  Dose: 10 mg Freq: DAILY Route: PO  Start: 03/22/17 1615        1701 (10 mg)-Given        0917 (10 mg)-Given        0830 (10 mg)-Given           brimonidine-timolol (COMBIGAN) 0.2-0.5 % ophthalmic solution 1 drop  Dose: 1 drop Freq: 2 TIMES DAILY Route: LEFT EYE  Start: 03/21/17 1200       (1303)-Not Given [C]       2149 (1 drop)-Given        0814 (1 drop)-Given       2101 (1 drop)-Given        0917 (1 drop)-Given       1924 (1 drop)-Given               0830 (1 drop)-Given       [ ] 2100           brinzolamide (AZOPT) 1 % ophthalmic susp 1 drop  Dose: 1 drop Freq: 2 TIMES DAILY Route: LEFT EYE  Start: 03/21/17 1200       (1304)-Not Given [C]       2131 (1 drop)-Given        0836 (1 drop)-Given       2052 (1 drop)-Given        0917 (1 drop)-Given       1935 (1 drop)-Given               0830 (1 drop)-Given       [ ] 2100           docusate sodium (COLACE) capsule 100 mg  Dose: 100 mg Freq: 2 TIMES DAILY Route: PO  Start: 03/21/17 1200   Admin Instructions: To prevent constipation.  Hold for loose stools.        (1305)-Not Given       (2108)-Not Given        0843 (100 mg)-Given       (2036)-Not Given        (0917)-Not Given [C]       1958 (100 mg)-Given               0829 (100 mg)-Given       [ ] 2100           fexofenadine (ALLEGRA) tablet 180 mg  Dose: 180 mg  Freq: DAILY Route: PO  Start: 03/22/17 2100 2045 (180 mg)-Given        1958 (180 mg)-Given               [ ] 2100           hydrALAZINE (APRESOLINE) injection 10 mg  Dose: 10 mg Freq: EVERY 4 HOURS PRN Route: IV  PRN Reason: high blood pressure  PRN Comment: give for SBP > 160, DBP>100  Start: 03/21/17 1637        0029 (10 mg)-Given       0904 (10 mg)-Given [C]             irbesartan (AVAPRO) tablet 150 mg  Dose: 150 mg Freq: 2 TIMES DAILY. Route: PO  Start: 03/22/17 1600        (1705)-Not Given        0917 (150 mg)-Given       1612 (150 mg)-Given        0829 (150 mg)-Given       [ ] 1600           labetalol (NORMODYNE/TRANDATE) injection 10 mg  Dose: 10 mg Freq: EVERY 2 HOURS PRN Route: IV  PRN Reason: high blood pressure  PRN Comment: give for SBP > 160, DBP>100  Start: 03/21/17 1636   Admin Instructions: Hold for HR < 60        1745 (10 mg)-Given        1300 (10 mg)-Given             lactobacillus rhamnosus (GG) (CULTURELL) capsule 1 capsule  Dose: 1 capsule Freq: 2 TIMES DAILY Route: PO  Start: 03/22/17 2100   Admin Instructions: Formulary alternate for<br>Advanced probiotic once daily  Administer at least 2 hours before or after oral antibiotics. Capsules may be opened.         2045 (1 capsule)-Given        0917 (1 capsule)-Given       1958 (1 capsule)-Given               0829 (1 capsule)-Given       [ ] 2100           levothyroxine (SYNTHROID/LEVOTHROID) tablet 50 mcg  Dose: 50 mcg Freq: DAILY Route: PO  Start: 03/22/17 1615        1701 (50 mcg)-Given        0635 (50 mcg)-Given        0652 (50 mcg)-Given           metoprolol (LOPRESSOR) tablet 75 mg  Dose: 75 mg Freq: 2 TIMES DAILY Route: PO  Start: 03/22/17 1800        2044 (75 mg)-Given [C]        0917 (75 mg)-Given       1909 (75 mg)-Given        0829 (75 mg)-Given       [ ] 1800           naloxone (NARCAN) injection 0.1-0.4 mg  Dose: 0.1-0.4 mg Freq: EVERY 2 MIN PRN Route: IV  PRN Reason: opioid reversal  Start: 03/21/17 1149   Admin Instructions:  For respiratory rate LESS than or EQUAL to 8.  Partial reversal dose:  0.1 mg titrated q 2 minutes for Analgesia Side Effects Monitoring Sedation Level of 3 (frequently drowsy, arousable, drifts to sleep during conversation).Full reversal dose:  0.4 mg bolus for Analgesia Side Effects Monitoring Sedation Level of 4 (somnolent, minimal or no response to stimulation).               pantoprazole (PROTONIX) EC tablet 40 mg  Dose: 40 mg Freq: 2 TIMES DAILY BEFORE MEALS Route: PO  Start: 03/23/17 1715   Admin Instructions: DO NOT CRUSH.          1909 (40 mg)-Given        0652 (40 mg)-Given       [ ] 1630           prochlorperazine (COMPAZINE) injection 5 mg  Dose: 5 mg Freq: EVERY 6 HOURS PRN Route: IV  PRN Reasons: nausea,vomiting  Start: 03/21/17 1149   Admin Instructions: This is Step 2 of nausea and vomiting management.   If nausea not resolved in 15 minutes, give metoclopramide (REGLAN) if ordered (step 3 of nausea and vomiting management)              Or  prochlorperazine (COMPAZINE) tablet 5 mg  Dose: 5 mg Freq: EVERY 6 HOURS PRN Route: PO  PRN Reason: vomiting  Start: 03/21/17 1149   Admin Instructions: This is Step 2 of nausea and vomiting management.   If nausea not resolved in 15 minutes, give metoclopramide (REGLAN) if ordered (step 3 of nausea and vomiting management)              Or  prochlorperazine (COMPAZINE) Suppository 12.5 mg  Dose: 12.5 mg Freq: EVERY 12 HOURS PRN Route: RE  PRN Reasons: nausea,vomiting  Start: 03/21/17 1149   Admin Instructions: This is Step 2 of nausea and vomiting management.   If nausea not resolved in 15 minutes, give metoclopramide (REGLAN) if ordered (step 3 of nausea and vomiting management)               sodium chloride (PF) 0.9% PF flush 3 mL  Dose: 3 mL Freq: EVERY 8 HOURS Route: IK  Start: 03/22/17 0900   Admin Instructions: And Q1H PRN, to lock peripheral IV dormant line.         0852 (3 mL)-Given       (1932)-Not Given        (0028)-Not Given       (0925)-Not Given        1612 (3 mL)-Given        (0100)-Not Given       0836 (3 mL)-Given       [ ] 1700           sodium chloride (PF) 0.9% PF flush 3 mL  Dose: 3 mL Freq: EVERY 1 HOUR PRN Route: IK  PRN Reason: line flush  Start: 03/22/17 0849   Admin Instructions: for peripheral IV flush post IV meds               sucralfate (CARAFATE) suspension 1 g  Dose: 1 g Freq: AT BEDTIME Route: PO  Start: 03/22/17 2200 2128 (1 g)-Given        1959 (1 g)-Given               [ ] 2200           travoprost (MARY Free) (TRAVATAN Z) 0.004 % ophthalmic solution 1 drop  Dose: 1 drop Freq: AT BEDTIME Route: LEFT EYE  Start: 03/21/17 2200 2126 (1 drop)-Given        2128 (1 drop)-Given        1958 (1 drop)-Given               [ ] 2200          Discontinued Medications  Medications 03/18/17 03/19/17 03/20/17 03/21/17 03/22/17 03/23/17 03/24/17         Dose: 40 mg Freq: 2 TIMES DAILY Route: IV  Start: 03/22/17 0900   End: 03/23/17 1710   Admin Instructions: IV permitted if strictly NPO or NG/FT unavailable.  FIRST DOSE STAT  Irritant.         0845 (40 mg)-Given       2048 (40 mg)-Given        0917 (40 mg)-Given       1710-Med Discontinued          Dose: 40 mg Freq: DAILY Route: IV  Start: 03/21/17 1200   End: 03/22/17 0844   Admin Instructions: IV permitted if strictly NPO or NG/FT unavailable.  FIRST DOSE STAT  Irritant.        1309 (40 mg)-Given        0844-Med Discontinued

## 2017-03-21 NOTE — IP AVS SNAPSHOT
MRN:[id number]                      After Visit Summary   3/21/2017    Milagros Marie    MRN: [id number]           Thank you!     Thank you for choosing [facility] for your care. Our goal is always to provide you with excellent care. Hearing back from our patients is one way we can continue to improve our services. Please take a few minutes to complete the written survey that you may receive in the mail after you visit with us. Thank you!        Patient Information     Date Of Birth          9/17/1923        About your hospital stay     You were admitted on:  March 21, 2017 You last received care in the:  [place] [facility] [address] [facility]    You were discharged on:  March 24, 2017       Who to Call     For medical emergencies, please call 911.  For non-urgent questions about your medical care, please call your primary care provider or clinic, [phone]          Attending Provider     Provider Specialty    Nickolas Looney MD --    Pb Woody MD Internal Medicine       Primary Care Provider Office Phone # Fax #    [phone] Jade Holley [phone]       [facility] [address] [place] [address]        After Care Instructions     Activity - Up with nursing assistance           Advance Diet as Tolerated       Follow this diet upon discharge: Orders Placed This Encounter  Mechanical soft            General info for SNF       Length of Stay Estimate: Short Term Care: Estimated # of Days <30  Condition at Discharge: Improving  Level of care:skilled   Rehabilitation Potential: Excellent  Admission H&P remains valid and up-to-date: Yes  Recent Chemotherapy: N/A  Use Nursing Home Standing Orders: Yes            Mantoux instructions       Give two-step Mantoux (PPD) Per Facility Policy Yes                  Follow-up Appointments     Follow Up and recommended labs and tests       Follow-up with nursing home physician in 3-5 days, CBC, BMP at that  "time.  Follow-up with renal in approximately 6 months as per their recommendation                  Additional Services     Occupational Therapy Adult Consult       Evaluate and treat as clinically indicated.    Reason:  Weakness            Physical Therapy Adult Consult       Evaluate and treat as clinically indicated.    Reason:  weakness                  Further instructions from your care team       [facility]  Phone Number is [phone]  Fax Number is [phone]    Pending Results     No orders found from 3/19/2017 to 3/22/2017.            Statement of Approval     Ordered          17 1120  I have reviewed and agree with all the recommendations and orders detailed in this document.  EFFECTIVE NOW     Approved and electronically signed by:  Lainey Fall MD             Admission Information     Date & Time Provider Department Dept. Phone    3/21/2017 Pb Woody MD [place] [address] [facility] [phone]      Your Vitals Were     Blood Pressure Pulse Temperature Respirations Height Weight    139/80 83 98.2  F (36.8  C) (Oral) 18 1.6 m (5' 3\") 56.3 kg (124 lb 3.2 oz)    Pulse Oximetry BMI (Body Mass Index)                98% 22 kg/m2          MyChart Information     [facility] lets you send messages to your doctor, view your test results, renew your prescriptions, schedule appointments and more. To sign up, go to [email or web address] . Click on \"Log in\" on the left side of the screen, which will take you to the Welcome page. Then click on \"Sign up Now\" on the right side of the page.     You will be asked to enter the access code listed below, as well as some personal information. Please follow the directions to create your username and password.     Your access code is: [email or web address]  Expires: 2017  7:44 PM     Your access code will  in 90 days. If you need help or a new code, please call your [facility] clinic or " [phone].        Care EveryWhere ID     This is your Care EveryWhere ID. This could be used by other organizations to access your [facility] medical records  [id number]           Review of your medicines      START taking        Dose / Directions    ferrous gluconate 324 (38 FE) MG tablet   Commonly known as:  FERGON        Dose:  324 mg   Take 1 tablet (324 mg) by mouth 2 times daily   Quantity:  100 tablet   Refills:  1       pantoprazole 40 MG EC tablet   Commonly known as:  PROTONIX        Take one tab orally twice a day for 8 weeks, then daily indefinitely,  Take on empty stomach about half an hour before eating.   Quantity:  60 tablet   Refills:  2       sucralfate 1 GM/10ML suspension   Commonly known as:  CARAFATE        Dose:  1 g   Take 10 mLs (1 g) by mouth At Bedtime for 14 days   Quantity:  140 mL   Refills:  0         CONTINUE these medicines which have NOT CHANGED        Dose / Directions    ACETAMINOPHEN EXTRA STRENGTH 500 MG tablet   Generic drug:  acetaminophen        Dose:  500 mg   Take 500 mg by mouth 3 times daily   Refills:  0       ADVANCED PROBIOTIC Caps        Dose:  1 tablet   Take 1 tablet by mouth daily (with lunch)   Refills:  0       ALLEGRA PO        Dose:  180 mg   Take 180 mg by mouth daily.   Refills:  0       AMLODIPINE BESYLATE PO        Dose:  10 mg   Take 10 mg by mouth daily   Refills:  0       AZOPT 1 % ophthalmic susp   Generic drug:  brinzolamide        Dose:  1 drop   Place 1 drop Into the left eye 2 times daily   Refills:  4       CALCIUM 600 + D PO        Dose:  1 tablet   Take 1 tablet by mouth daily   Refills:  0       cephALEXin 500 MG capsule   Commonly known as:  KEFLEX        Dose:  2000 mg   Take 2,000 mg by mouth as needed Administer one hour prior to dental procedure   Refills:  99       COMBIGAN 0.2-0.5 % ophthalmic solution   Generic drug:  brimonidine-timolol        Dose:  1 drop   Place 1 drop Into the left eye 2 times daily   Refills:  4        irbesartan 300 MG tablet   Commonly known as:  AVAPRO   Used for:  Essential hypertension        Dose:  150 mg   Take 0.5 tablets (150 mg) by mouth 2 times daily   Quantity:  30 tablet   Refills:  0       levothyroxine 50 MCG tablet   Commonly known as:  SYNTHROID/LEVOTHROID   Used for:  Hypothyroidism due to acquired atrophy of thyroid        TAKE 1 TABLET (50 MCG) BY MOUTH DAILY   Quantity:  90 tablet   Refills:  2       Magnesium 500 MG Caps   Indication:  Low Amount of Magnesium in the Blood        Dose:  1 tablet   Take 1 tablet by mouth daily Daughter does not know salt form   Refills:  0       metoprolol 50 MG tablet   Commonly known as:  LOPRESSOR   Used for:  Encounter for medication refill        TAKE 1 AND 1/2 TABLET TWICE DAILY WITH FOOD OR MILK   Quantity:  270 tablet   Refills:  3       TRAVATAN 0.004 % ophthalmic solution   Generic drug:  travoprost Z (benzalkonium)        Dose:  1 drop   1 drop. One drop in left eye at bedtime.   Refills:  0         STOP taking     aspirin 81 MG chewable tablet                Where to get your medicines      Some of these will need a paper prescription and others can be bought over the counter. Ask your nurse if you have questions.     You don't need a prescription for these medications     ferrous gluconate 324 (38 FE) MG tablet    pantoprazole 40 MG EC tablet    sucralfate 1 GM/10ML suspension                Protect others around you: Learn how to safely use, store and throw away your medicines at www.disposemymeds.org.             Medication List: This is a list of all your medications and when to take them. Check marks below indicate your daily home schedule. Keep this list as a reference.      Medications           Morning Afternoon Evening Bedtime As Needed    ACETAMINOPHEN EXTRA STRENGTH 500 MG tablet   Take 500 mg by mouth 3 times daily   Generic drug:  acetaminophen                                ADVANCED PROBIOTIC Caps   Take 1 tablet by mouth daily (with  lunch)                                ALLEGRA PO   Take 180 mg by mouth daily.   Last time this was given:  180 mg on 3/23/2017  7:58 PM                                AMLODIPINE BESYLATE PO   Take 10 mg by mouth daily   Last time this was given:  10 mg on 3/24/2017  8:30 AM                                AZOPT 1 % ophthalmic susp   Place 1 drop Into the left eye 2 times daily   Last time this was given:  1 drop on 3/24/2017  8:30 AM   Generic drug:  brinzolamide                                CALCIUM 600 + D PO   Take 1 tablet by mouth daily                                cephALEXin 500 MG capsule   Commonly known as:  KEFLEX   Take 2,000 mg by mouth as needed Administer one hour prior to dental procedure                                COMBIGAN 0.2-0.5 % ophthalmic solution   Place 1 drop Into the left eye 2 times daily   Last time this was given:  1 drop on 3/24/2017  8:30 AM   Generic drug:  brimonidine-timolol                                ferrous gluconate 324 (38 FE) MG tablet   Commonly known as:  FERGON   Take 1 tablet (324 mg) by mouth 2 times daily                                irbesartan 300 MG tablet   Commonly known as:  AVAPRO   Take 0.5 tablets (150 mg) by mouth 2 times daily   Last time this was given:  150 mg on 3/24/2017  8:29 AM                                levothyroxine 50 MCG tablet   Commonly known as:  SYNTHROID/LEVOTHROID   TAKE 1 TABLET (50 MCG) BY MOUTH DAILY   Last time this was given:  50 mcg on 3/24/2017  6:52 AM                                Magnesium 500 MG Caps   Take 1 tablet by mouth daily Daughter does not know salt form                                metoprolol 50 MG tablet   Commonly known as:  LOPRESSOR   TAKE 1 AND 1/2 TABLET TWICE DAILY WITH FOOD OR MILK   Last time this was given:  75 mg on 3/24/2017  8:29 AM                                pantoprazole 40 MG EC tablet   Commonly known as:  PROTONIX   Take one tab orally twice a day for 8 weeks, then daily indefinitely,   Take on empty stomach about half an hour before eating.   Last time this was given:  40 mg on 3/24/2017  6:52 AM                                sucralfate 1 GM/10ML suspension   Commonly known as:  CARAFATE   Take 10 mLs (1 g) by mouth At Bedtime for 14 days   Last time this was given:  1 g on 3/23/2017  7:59 PM                                TRAVATAN 0.004 % ophthalmic solution   1 drop. One drop in left eye at bedtime.   Last time this was given:  1 drop on 3/23/2017  7:58 PM   Generic drug:  travoprost Z (benzalkonium)

## 2017-03-21 NOTE — PLAN OF CARE
Problem: Goal Outcome Summary  Goal: Goal Outcome Summary  Outcome: No Change  New admit from ED, d/t fall and rectal bleeding. Baseline dementia, [place] and vision issue. Fall risk and DNR/DNI. VSS, denied any pain/discomfort. She had dark soft stool at arrival. Assist of one w care. Last hgb was 7.8. Plan to administer one unit PRBC per order. Will continue to monitor.

## 2017-03-21 NOTE — IP AVS SNAPSHOT
` `     [place] [address] [facility]: [phone]                 INTERAGENCY TRANSFER FORM - NOTES (H&P, Discharge Summary, Consults, Procedures, Therapies)   3/21/2017                    Hospital Admission Date: 3/21/2017  CHELLY RENAE   : 1923  Sex: Female        Patient PCP Information     Provider PCP Type    Maria Eugenia Holley MD General      History & Physicals     No notes of this type exist for this encounter.      Discharge Summaries     No notes of this type exist for this encounter.         Consult Notes      Consults by Fatou Marley MD at 3/21/2017  4:45 PM     Author:  Fatou Marley MD Service:  Gastroenterology Author Type:  Physician    Filed:  3/21/2017  5:41 PM Date of Service:  3/21/2017  4:45 PM Note Created:  3/21/2017  4:16 PM    Status:  Addendum :  Fatou Marley MD (Physician)         [facility]    Gastroenterology Consultation    Date of Admission:  3/21/2017  Date of Consult (When I saw the patient):[CS1.1] 17[CS1.2]    Assessment & Plan[CS1.3]   Chelly Renae is a 89 year old female who was admitted on 3/21/2017. I was asked to see the patient for melena.    Melena  Drop in hgb  Aspirin use  Advanced age  Dementia    The site of bleeding is likely an upper GI bleed, such as gastric or duodenal ulcer, hemorrhagic gastritis/duodenitis, neoplasm vs small bowel lesion or right sided colonic.  She is demented and would require anesthesia for sedation, whether MAC or intubated, per their discretion. She is at higher risk for procedural risks, due to her age.  Her daughter, by the bedside, agrees that following a conservative course for treatment is a good objective, in this circumstance.    PLAN:  Supportive care.  IV PPI  Follow clinically.  Transfuse if needed.  Hold aspirin[CS1.1]      Fatou Marley[CS1.3], MD  [facility]  Office: [phone]  Cell:  [phone]  Monday through Thursday 8am to  5pm, Friday 8am to 4pm[CS1.1]    Reason for Consult[CS1.3]   Reason for consult: I was asked by Dr. Pb Woody to evaluate this patient for GI bleeding.[CS1.1]    Primary Care Physician   Maria Eugenia Holley    Chief Complaint[CS1.3]   Intestinal bleeding    History is obtained from the patient and medical records[CS1.1]    History of Present Illness[CS1.3]   Milagros Marie is a 89 year old female who presents with[CS1.1] melena.   The patient is demented, and therefore the daughter provided the history.  The patient lives with her daughter, who found her slumped over the toilet. EMS for notified, and when they arrived, they noticed tarry stools in the toilet. In the ER, her hgb was low at 7.6. She passed more melena in the ER, where her vital signs were initially soft.   The vital signs normalized with fluids.  She takes aspirin chronically for a history of a suspected TIA or CVI in the past. She takes no other blood thinning agents or anticoagulants.[CS1.4]    Past Medical History[CS1.3]    I have reviewed this patient's medical history and updated it with pertinent information if needed.[CS1.1]   Past Medical History   Diagnosis Date     ACP (advance care planning)      in media section     DJD (degenerative joint disease)      Essential hypertension, benign      Glaucoma      Homocysteinemia (H)      HTN (hypertension)      Osteopenia      Reflux      Renal failure 2008     stage 3-4     Stenosis      Stress incontinence, female      Unspecified hypothyroidism        Past Surgical History[CS1.3]   I have reviewed this patient's surgical history and updated it with pertinent information if needed.[CS1.1]  Past Surgical History   Procedure Laterality Date     Joint replacemtn, knee rt/lt       Joint Replacement knee RT/LT     Hysterectomy       tabso     Carpal tunnel release rt/lt       Cataract iol, rt/lt       Cataract IOL RT/LT     Blepharoplasty       Eye surgery       Bladder surgery         Prior to  Admission Medications   Prior to Admission Medications   Prescriptions Last Dose Informant Patient Reported? Taking?   AMLODIPINE BESYLATE PO 3/20/2017 at 1200 Daughter Yes Yes   Sig: Take 10 mg by mouth daily   AZOPT 1 % ophthalmic suspension 3/20/2017 at pm Daughter Yes Yes   Sig: Place 1 drop Into the left eye 2 times daily    COMBIGAN 0.2-0.5 % ophthalmic solution 3/20/2017 at pm Daughter Yes Yes   Sig: Place 1 drop Into the left eye 2 times daily   Calcium Carbonate-Vitamin D (CALCIUM 600 + D OR) 3/20/2017 at Unknown time Daughter Yes Yes   Sig: Take 1 tablet by mouth daily    Fexofenadine HCl (ALLEGRA PO) 3/20/2017 at pm Daughter Yes Yes   Sig: Take 180 mg by mouth daily.    Magnesium 500 MG CAPS 3/20/2017 at Unknown time Daughter Yes Yes   Sig: Take 1 tablet by mouth daily Daughter does not know salt form   Probiotic Product (ADVANCED PROBIOTIC) CAPS 3/20/2017 at 1200 Daughter Yes Yes   Sig: Take 1 tablet by mouth daily (with lunch)    acetaminophen (ACETAMINOPHEN EXTRA STRENGTH) 500 MG tablet 3/20/2017 at Unknown time Daughter Yes Yes   Sig: Take 500 mg by mouth 3 times daily    aspirin 81 MG chewable tablet 3/20/2017 at pm Daughter Yes Yes   Sig: Take 1 tablet (81 mg) by mouth daily   cephALEXin (KEFLEX) 500 MG capsule  Daughter Yes No   Sig: Take 2,000 mg by mouth as needed Administer one hour prior to dental procedure   irbesartan (AVAPRO) 300 MG tablet 3/20/2017 at pm Daughter No Yes   Sig: Take 0.5 tablets (150 mg) by mouth 2 times daily   levothyroxine (SYNTHROID/LEVOTHROID) 50 MCG tablet 3/20/2017 at am Daughter No Yes   Sig: TAKE 1 TABLET (50 MCG) BY MOUTH DAILY   metoprolol (LOPRESSOR) 50 MG tablet 3/20/2017 at pm Daughter No Yes   Sig: TAKE 1 AND 1/2 TABLET TWICE DAILY WITH FOOD OR MILK   travoprost Z, benzalkonium, (TRAVATAN) 0.004 % ophthalmic solution 3/20/2017 at 2200 Daughter Yes Yes   Si drop. One drop in left eye at bedtime.       Facility-Administered Medications: None     Allergies    Allergies   Allergen Reactions     Sulfa Drugs        Social History[CS1.3]   I have reviewed this patient's social history and updated it with pertinent information if needed. Milagros Marie[CS1.1]  reports that she has never smoked. She has never used smokeless tobacco. She reports that she drinks alcohol.    Family History[CS1.3]   I have reviewed this patient's family history and updated it with pertinent information if needed.[CS1.1]   Family History   Problem Relation Age of Onset     HEART DISEASE Mother      HEART DISEASE Father      Cancer - colorectal Sister      CANCER Sister        Review of Systems[CS1.3]   The 10 point Review of Systems is negative other than noted in the HPI or here.[CS1.1]     Physical Exam   Temp: 98.1  F (36.7  C) Temp src: Oral BP: 170/84 Pulse: 83 Heart Rate: 83 Resp: 16 SpO2: 99 % O2 Device: None (Room air)[CS1.3]    Vital Signs with Ranges[CS1.1]  Temp:  [96.3  F (35.7  C)-98.3  F (36.8  C)] 98.1  F (36.7  C)  Pulse:  [77-83] 83  Heart Rate:  [71-83] 83  Resp:  [10-27] 16  BP: (116-170)/(56-84) 170/84  SpO2:  [98 %-99 %] 99 %  124 lbs 3.2 oz[CS1.3]      Constitutional: Awake, confused, cooperative, no apparent distress.  Eyes: Conjunctiva and pupils examined and normal.  HEENT: Moist mucous membranes, normal dentition.  Respiratory: Clear to auscultation bilaterally, no crackles or wheezing.  Cardiovascular: Regular rate and rhythm, normal S1 and S2, and no murmur noted.  GI: Soft, non-distended, non-tender, normal bowel sounds.  Lymph/Hematologic: No anterior cervical or supraclavicular adenopathy.  Skin: No rashes, no cyanosis, no edema.  Musculoskeletal: No joint swelling, erythema or tenderness.  Neurologic: Cranial nerves 2-12 intact, normal strength and sensation.  Psychiatric: Confused, no obvious anxiety or depression.  Extr: no CCE.[CS1.1]    Data[CS1.3]   -Data reviewed today: All pertinent laboratory and imaging results from this encounter were  reviewed.[CS1.1]    Recent Labs  Lab 03/21/17  0819   WBC 8.0   HGB 7.6*   MCV 93      INR 1.05      POTASSIUM 4.0   CHLORIDE 104   CO2 21   BUN 56*   CR 1.80*   ANIONGAP 11   SAMSON 7.8*   *   ALBUMIN 2.6*   PROTTOTAL 5.3*   BILITOTAL 0.6   ALKPHOS 58   ALT 11   AST 12     No results for input(s): IRON, IRONSAT, RETICABSCT, RETP, FEB, LEXA, B12, FOLIC, EPOE, MORPH in the last 168 hours.    Recent Results (from the past 24 hour(s))   POC US ABDOMEN LIMITED    Impression    [facility] Procedure Note      Limited Bedside ED Aorta Ultrasound:    PROCEDURE: PERFORMED BY: Dr. Nickolas Looney  INDICATIONS:  Abdominal Pain    PROBE:  Low frequency convex probe  BODY LOCATION: Abdomen  FINDINGS:  The ultrasound was performed using transverse views.   Normal: Abdominal aorta < 4 cm.  MEASUREMENT:  2.6 cm   No evidence of free fluid in hepatorenal (Morison s pouch), perisplenic, or and pelvic areas. No evidence of pericardial effusion.  INTERPRETATION:  The evaluation of the aorta was of normal caliber (ie < 4cm in the transverse/longitudinal views) without evidence of aneurysm or dilation.  Aorta visualized in entirety from insertion into the intra-abdominal cavity to bifurcation into iliac vessels. There was no abdominal free fluid.  IMAGE DOCUMENTATION: Images were archived to PACs system.     Chest  XR, 1 view PORTABLE    Narrative    XR CHEST PORT 1 VW 3/21/2017 9:11 AM    HISTORY: syncope      Impression    IMPRESSION: Moderate sized esophageal hiatal hernia. Minimal  interstitial infiltrate left lung base which may be acute or chronic.  No other findings.    KRISTIN HARRIS MD   Head CT w/o contrast    Narrative    CT SCAN OF THE HEAD WITHOUT CONTRAST March 21, 2017 9:35 AM     HISTORY: Altered mentation, syncope.    TECHNIQUE:  Axial images of the head and coronal reformations without  IV contrast material. Radiation dose for this scan was reduced using  automated exposure control, adjustment  of the mA and/or kV according  to patient size, or iterative reconstruction technique.    COMPARISON: MR dated 10/9/2015.    FINDINGS: Mild to moderate cerebral atrophy is present. There are some  minimal patchy white matter changes in both hemispheres without mass  effect. There is no evidence for intracranial hemorrhage, mass effect,  acute infarct, or skull fracture.      Impression    IMPRESSION: Chronic changes. No evidence for intracranial hemorrhage  or any acute process.    JA SERRA MD   Abd/pelvis CT no contrast - Stone Protocol    Narrative    CT ABDOMEN AND PELVIS WITHOUT CONTRAST  3/21/2017 9:35 AM     HISTORY: Abdomen pain and melena.  Chronic kidney disease.    TECHNIQUE: Noncontrast CT abdomen and pelvis was performed. Radiation  dose for this scan was reduced using automated exposure control,  adjustment of the mA and/or kV according to patient size, or iterative  reconstruction technique.    COMPARISON: CT abdomen and pelvis 6/12/2009.    FINDINGS: There is a moderate hiatal hernia that is more distended  than the prior exam. Left renal atrophy. A hyperdense lesion at the  anterior medial right mid kidney is 0.6 cm and appears new on image  24. There is a cyst posteriorly at this same image, and likely other  very ill-defined right renal cysts. Cholelithiasis. There is no acute  abnormality involving the liver, spleen, adrenals, or pancreas  identified at unenhanced scanning. There is a hepatic cyst again  identified, image 25, at the anterior lower liver. No hydronephrosis.  No evidence for bowel obstruction. Stool distends the rectosigmoid  colon. Distal colonic diverticulosis without diverticulitis. There is  also moderate stool distending the more proximal colon. Vascular  calcifications. There is no abscess or free air identified. There is a  degree of subcutaneous edema.      Impression    IMPRESSION:  1. No convincing acute abnormality is seen.  2. Stool distends the colon.  3.  Cholelithiasis.  4. Moderate hiatal hernia is more distended than in 2009.  5. An indeterminate new hyperdense medial right renal lesion is  subcentimeter. This may just be a small hemorrhagic cyst. A solid  lesion is not completely excluded.  6. Nonspecific mild subcutaneous edema diffusely.  7. Diffuse vascular calcifications.    MANPREET WATKINS MD[CS1.3]              Revision History        User Key Date/Time User Provider Type Action    > CS1.4 3/21/2017  5:41 PM Fatou Marley MD Physician Addend     CS1.2 3/21/2017  4:49 PM Fatou Marley MD Physician Sign     CS1.3 3/21/2017  4:17 PM Fatou Marley MD Physician      CS1.1 3/21/2017  4:16 PM Fatou Marley MD Physician                      Progress Notes - Physician (Notes from 03/20/17 through 03/23/17)      Progress Notes by Lainey Fall MD at 3/23/2017 11:37 AM     Author:  Lainey Fall MD Service:  Hospitalist Author Type:  Physician    Filed:  3/23/2017  5:09 PM Date of Service:  3/23/2017 11:37 AM Note Created:  3/23/2017 11:37 AM    Status:  Signed :  Lainey Fall MD (Physician)         [facility]    Hospitalist Progress Note    Assessment & Plan   Milagros Marie is a 89 year old female who was admitted on 3/21/2017.       89-year-old female with end-stage renal disease, osteopenia, hypertension, hypothyroidism and dementia who is presenting today after being found with altered mental status and melena and hemoglobin is several points lower than baseline suggesting acute blood loss. Also, she has acute on chronic renal insufficiency and lactic acidosis suggestive of acute blood loss versus hypovolemia should be admitted for blood transfusion and GI consult and stabilization.       PLAN:   1. Melena secondary to suspected upper GI bleed.   - discontinue asa with risk > benefit in this demented patient.  - hgb down from 9.2 post transfusion to 7.7, 7.9, 7.4[EL1.1], 7.9,  7.3[EL1.2]  -[EL1.1] recheck hgb in am[EL1.2]  - conservative approach  -[EL1.1] change protonix to bid po[EL1.2]  2. History of hypertension.   - resume her metoprolol and amlodipine, hold irbesartan for now.  3. History of hypothyroidism.   - resume[EL1.1]d[EL1.2] 50 mcg a day   4. GERD.   - continue on po Protonix bid for the upper GI bleed and this should help cover GERD.   5. DVT prophylaxis; withholding anticoagulation due to GI bleed would only use PCDs while in bed.   6. Code status: Daughter confirms DNR/DNI.       DISPOSITION:  - to TCU on 3/24 once hgb stabilizes           Lainey Fall MD    Interval History[EL1.1]   No new problems, plan d/c to TCU tomorrow[EL1.2]  -Data reviewed today: I reviewed all new labs and imaging results over the last 24 hours. I personally reviewed no images or EKG's today.    Physical Exam   Temp: 97.6  F (36.4  C) Temp src: Oral BP: 135/63   Heart Rate: 61 Resp: 16 SpO2: 97 % O2 Device: None (Room air)    Vitals:    03/21/17 1146   Weight: 56.3 kg (124 lb 3.2 oz)     Vital Signs with Ranges  Temp:  [97.6  F (36.4  C)-98.7  F (37.1  C)] 97.6  F (36.4  C)  Heart Rate:  [61-98] 61  Resp:  [16] 16  BP: (135-163)/(63-80) 135/63  SpO2:  [96 %-99 %] 97 %  I/O last 3 completed shifts:  In: 1300 [P.O.:200; I.V.:1100]  Out: -     Constitutional: alert   Respiratory: clear to auscultation, no wheezing or rhonchi   Cardiovascular: regular rate and rhythm without murmer or rub   GI:positive bowel sounds, non-tender   Skin/Integumen: no rashes    Other:        Medications     NaCl 100 mL/hr at 03/23/17 0635       pantoprazole (PROTONIX) IV PEDS/NICU  40 mg Intravenous BID     sucralfate  1 g Oral At Bedtime     sodium chloride (PF)  3 mL Intracatheter Q8H     amLODIPine (NORVASC) tablet 10 mg  10 mg Oral Daily     fexofenadine (ALLEGRA) tablet 180 mg  180 mg Oral Daily     irbesartan  150 mg Oral BID     metoprolol  75 mg Oral BID     lactobacillus rhamnosus (GG)  1 capsule Oral  BID     levothyroxine  50 mcg Oral Daily     brinzolamide  1 drop Left Eye BID     brimonidine-timolol  1 drop Left Eye BID     docusate sodium  100 mg Oral BID     travoprost (MARY Free)  1 drop Left Eye At Bedtime       Data     Recent Labs  Lab 03/23/17  0558 03/22/17 2000 03/22/17  0746  03/21/17  0819   WBC  --   --  6.1  --  8.0   HGB 7.3* 7.9* 7.4*  < > 7.6*   MCV  --   --  93  --  93   PLT  --   --  188  --  260   INR  --   --   --   --  1.05   NA  --   --  141  --  136   POTASSIUM  --   --  3.8  --  4.0   CHLORIDE  --   --  112*  --  104   CO2  --   --  21  --  21   BUN  --   --  56*  --  56*   CR  --   --  1.56*  --  1.80*   ANIONGAP  --   --  8  --  11   SAMSON  --   --  8.0*  --  7.8*   GLC  --   --  93  --  156*   ALBUMIN  --   --   --   --  2.6*   PROTTOTAL  --   --   --   --  5.3*   BILITOTAL  --   --   --   --  0.6   ALKPHOS  --   --   --   --  58   ALT  --   --   --   --  11   AST  --   --   --   --  12   TROPI  --   --   --   --  <0.015The 99th percentile for upper reference range is 0.045 ug/L.  Troponin values in the range of 0.045 - 0.120 ug/L may be associated with risks of adverse clinical events.   < > = values in this interval not displayed.    No results found for this or any previous visit (from the past 24 hour(s)).[EL1.1]     Revision History        User Key Date/Time User Provider Type Action    > EL1.2 3/23/2017  5:09 PM Lainey Fall MD Physician Sign     EL1.1 3/23/2017 11:37 AM Lainey Fall MD Physician             ED Provider Notes by Nickolas Looney MD at 3/21/2017  8:11 AM     Author:  Nickolas Looney MD Service:  Care Coordinator Author Type:  Physician    Filed:  3/23/2017  5:04 PM Date of Service:  3/21/2017  8:11 AM Note Created:  3/21/2017  8:13 AM    Status:  Signed :  Nickolas Looney MD (Physician)           History     Chief Complaint:[LJ1.1]  Syncope[LJ1.2] & Rectal Bleeding    HPI   HPI is limited secondary to the patient's history of  dementia. History is supplemented by EMS and the patient's daughter.     Milagros Marie is a 89 year old female with a history of dementia, homocystinemia and renal failure who presents to the emergency department today via EMS and her daughter for evaluation of[LJ1.1] syncope[LJ1.2] and rectal bleeding this morning. EMS states that the patient felt fine yesterday, and was ambulating with her walker and acting like herself per the patient's daughter. EMS states that the patient got up this morning at the private residence that she resides at with her daughter, and was found[LJ1.1] on the ground between the toilet and bathtub by her daughter[LJ1.2], prompting 911 to be called. EMS notes that upon arrival, the patient had a decreased level of consciousness and was unable to follow many commands. The patient was noted to have vital signs within normal limits, including a blood pressure of 114/66 and a pulse in the upper 60s-mid 70s alongside a normal blood sugar and[LJ1.1] 12-Lead[LJ1.2]. EMS mentions that there was bloody diarrhea found in the toilet, which was made this morning. The patient complains of abdominal pain according to EMS, but upon evaluation in the ED, the patient has no complaints of pain. Moreover, the patient states that she became incontinent of her urine. The patient denies chest pain and shortness of breath.       Allergies:  Sulfa Drugs      Medications:    Levothyroxine (synthroid/levothroid) 50 mcg tablet  Oxycodone (roxicodone) 5 mg immediate release tablet  Amlodipine besylate po  Loteprednol etabonate 0.5 % gel  Metoprolol (lopressor) 50 mg tablet  Azopt 1 % ophthalmic suspension  Cephalexin (keflex) 500 mg capsule  Irbesartan (avapro) 300 mg tablet  Probiotic product (advanced probiotic) caps  Aspirin 81 mg chewable tablet  Acetaminophen (acetaminophen extra strength) 500 mg tablet  Combigan 0.2-0.5 % ophthalmic solution  Fexofenadine hcl (allegra po)  Travoprost z, benzalkonium,  (travatan) 0.004 % ophthalmic solution  Magnesium 500 mg caps  Calcium carbonate-vitamin d (calcium 600 + d or)     Past Medical History:    ACP (advance care planning)   DJD (degenerative joint disease)   Essential hypertension, benign   Glaucoma   Homocysteinemia (H)   HTN (hypertension)   Osteopenia   Reflux   Renal failure   Stenosis   Stress incontinence, female   Unspecified hypothyroidism   Dementia     Past Surgical History:    Joint replacemtn, knee rt/lt   Hysterectomy   Carpal tunnel release rt/lt   Cataract iol, rt/lt   Blepharoplasty   Eye surgery   Bladder surgery     Family History:    Heart disease: mother, father   Colorectal cancer: sister   Cancer: sister     Social History:  The patient was accompanied to the ED by EMS and daughter.  Smoking Status: never  Smokeless Tobacco: never  Alcohol Use: positive  Marital Status:   [2]     Review of Systems   Unable to perform ROS: Dementia     Physical Exam   Vitals:[LJ1.1]  Patient Vitals for the past 24 hrs:   BP Temp Temp src Heart Rate Resp SpO2   03/21/17 1000 148/69 - - 79 - 98 %   03/21/17 0959 145/64 - - 71 - 99 %   03/21/17 0845 132/76 - - 76 10 99 %   03/21/17 0830 123/61 - - 77 17 99 %   03/21/17 0826 - - - 77 27 99 %   03/21/17 0825 121/69 - - - - -   03/21/17 0815 116/65 97.6  F (36.4  C) Oral 81 16 99 %[LJ1.3]        Physical Exam[LJ1.1]  General: Appears well-developed and well-nourished.   Head: No signs of trauma.   Mouth/Throat: Oropharynx is clear and moist.   Eyes: Conjunctivae are normal. Pupils are equal, round, and reactive to light.   Neck: Normal range of motion. No nuchal rigidity. No cervical adenopathy  CV: Normal rate and regular rhythm.    Resp: Effort normal and breath sounds normal. No respiratory distress.   GI: Soft. There is no tenderness or guarding.  Normal bowel sounds.  No CVA tenderness.  Rectal:  Melanotic stool present.  MSK: Normal range of motion. no edema. No Calf tenderness.  Neuro: The patient is alert  and oriented to person, place.  Poor historian, which daughter confirms dementia.  PERRLA, EOMI, strength in upper/lower extremities generally weak but symmetrical. Sensation normal. Speech normal.  Skin: Skin is warm and dry. No rash noted.   Psych: normal mood and affect. behavior is normal.[JB1.1]     Emergency Department Course     ECG:  ECG taken at 0816, ECG read at 0816  Normal sinus rhythm   Left axis deviation   Incomplete right bundle branch block   Left ventricular hypertrophy with repolarization abnormality   Cannot rule out septal infarct, age undetermined   Abnormal ECG  Rate 79 bpm. HI interval 176. QRS duration 106. QT/QTc 396/454. P-R-T axes 21 -35 81.      Imaging:  Radiology findings were communicated with the patient who voiced understanding of the findings.[LJ1.1]    Head CT w/o contrast  IMPRESSION: Chronic changes. No evidence for intracranial hemorrhage  or any acute process.  Reading per radiology    Abd/pelvis CT no contrast - Stone Protocol  IMPRESSION:  1. No convincing acute abnormality is seen.  2. Stool distends the colon.  3. Cholelithiasis.  4. Moderate hiatal hernia is more distended than in 2009.  5. An indeterminate new hyperdense medial right renal lesion is  subcentimeter. This may just be a small hemorrhagic cyst. A solid  lesion is not completely excluded.  6. Nonspecific mild subcutaneous edema diffusely.  7. Diffuse vascular calcifications.  Reading per radiology    Chest  XR, 1 view PORTABLE  IMPRESSION: Moderate sized esophageal hiatal hernia. Minimal  interstitial infiltrate left lung base which may be acute or chronic.  No other findings.  Reading per radiology    POC US ABDOMEN LIMITED  [facility] Procedure Note      Limited Bedside ED Aorta Ultrasound:    PROCEDURE: PERFORMED BY: Dr. Nickolas Looney  INDICATIONS:  Abdominal Pain    PROBE:  Low frequency convex probe  BODY LOCATION: Abdomen  FINDINGS:  The ultrasound was performed using transverse  views.   Normal: Abdominal aorta < 4 cm.  MEASUREMENT:  2.6 cm   No evidence of free fluid in hepatorenal (Morison s pouch), perisplenic, or and pelvic areas. No evidence of pericardial effusion.  INTERPRETATION:  The evaluation of the aorta was of normal caliber (ie < 4cm in the transverse/longitudinal views) without evidence of aneurysm or dilation.  Aorta visualized in entirety from insertion into the intra-abdominal cavity to bifurcation into iliac vessels. There was no abdominal free fluid.  IMAGE DOCUMENTATION: Images were archived to PACs system.[LJ1.2]      Laboratory:  Laboratory findings were communicated with the patient who voiced understanding of the findings.    Occult blood stool: positive    CBC: HGB 7.6 (L) o/w WNL. (WBC 8.0, )   CMP: Creatinine 1.80 (H), glucose 156 (H), BUN 56 (H), GFR estimate 26 (L), calcium 7.8 (L), albumin 2.6 (L), protein total 5.3 (L) o/w WNL.   Troponin (Collected 0819): <0.015  INR: 1.05  ABO/Rh type and screen:[LJ1.1] O positive, negative for antibody[LJ1.2]   Lactic Acid (Collected at 0819): 2.7 (H)[LJ1.1]    UA: protein albumin urine 30 (A), mucous urine present (A) o/w WNL.[LJ1.2]       Interventions:  0830: NS 1000 mL IV   0830: NaCl infusion 1000 mL IV[LJ1.1]   0910: NS 1000 mL IV   0957: Protonix 8 mg/hr IV   0953: Protonix 80 mg IV[LJ1.2]        Emergency Department Course:  0811: The patient arrives via EMS.   0812: I performed an exam of the patient as documented above.   0816: EKG obtained in the ED, see results above.   0817: I spoke with the patient's daughter.[LJ1.1] The patient is confirmed to be DNR/DNI.[LJ1.2]  0819:[LJ1.1] IV was inserted and blood was drawn for laboratory testing, results above.[LJ1.2]  0830: I performed a bedside ultrasound on the patient as documented above.   The patient was sent for a CT head, XR chest[LJ1.1], CT abdomen pelvis[LJ1.2] while in the emergency department, results above.[LJ1.1]   The patient provided a urine  sample here in the emergency department. This was sent for laboratory testing, findings above.  The patient provided a stool sample here in the emergency department. This was sent for laboratory testing, findings above.  1013: I spoke with Dr. Woody of the Hospitalist service from [facility] regarding patient's presentation, findings, and plan of care.[LJ1.2]  At[LJ1.1] 1015[LJ1.2] the patient and her daughter was rechecked and was updated on the results of her laboratory and imaging studies.[LJ1.1]   I discussed the treatment plan with the patient. They expressed understanding of this plan and consented to admission. I discussed the patient with Dr. Woody, who will admit the patient to a monitored bed for further evaluation and treatment.[LJ1.2]  I personally reviewed the laboratory and imaging results with the Patient and daughter and answered all related questions prior to[LJ1.1] admission[LJ1.2].    Impression & Plan      Medical Decision Making:  Milagros Marie is a 89 year old female who presents to the emergency department today[LJ1.1] after collapsing in the bathroom. She lives independently with her daughter, who heard her in the bathroom, went to check on her and found her between the toilet and the bath tub. EMS noted bloody stool in the toilet. Upon my evaluation, the patient denies specific complaints. She does have dementia and is a fairly poor historian overall. At certain times while obtaining the history she would complain of abdominal pain but primarily did not. On her exam, her abdomen was soft without any apparent tenderness. She did have obvious melena. The remainder of her exam was non-focal. Blood work was obtained which did show anemia. She has a history of CKD and her creatinine was near baseline. I did start her on a Protonix drip given the concern for GI bleed. I did not initiate transfusion given that her vital signs were stable and her hemoglobin was above 7. The patient  remained stable throughout her ED course and will be admitted to the hospitalist service for further monitoring, treatment and evaluation.[LJ1.2]       Diagnosis:[LJ1.1]    ICD-10-CM    1. GI bleed K92.2[LJ1.3]        Disposition:[LJ1.1]   The patient is admitted to the hospital.[LJ1.2]       Scribe Disclosure:  MAIN Andres Nickolas, am serving as a scribe at 8:13 AM on 3/21/2017 to document services personally performed by Nickolas Looney MD, based on my observations and the provider's statements to me.    3/21/2017    EMERGENCY DEPARTMENT[LJ1.1]       Nickolas Looney MD  03/23/17 1704  [JB1.2]     Revision History        User Key Date/Time User Provider Type Action    > JB1.2 3/23/2017  5:04 PM Nickolas Looney MD Physician Sign     JB1.1 3/23/2017  5:02 PM Nickolas Looney MD Physician      [N/A] 3/23/2017  1:05 PM Nickolas Looney MD Physician Share     [N/A] 3/22/2017  9:04 AM Chely Adhikari RN Case Manager Share     LJ1.3 3/21/2017 11:39 AM Andres Olmos Scribe Share     LJ1.2 3/21/2017  9:54 AM Andres Olmos Scribe      LJ1.1 3/21/2017  9:37 AM Andres Olmos Scribe Share            Progress Notes by Elo Silverman RN at 3/23/2017  2:42 PM     Author:  Elo Silverman RN Service:  Vascular Access Team Author Type:  Registered Nurse    Filed:  3/23/2017  2:43 PM Date of Service:  3/23/2017  2:42 PM Note Created:  3/23/2017  2:42 PM    Status:  Signed :  Elo Silverman RN (Registered Nurse)         Assessed both arms with US for venous access.  Veins are small and sclerosed.  22AC placed in SULLY, but will be fragile.. Discussed with pt.'s nurse.[MR1.1]     Revision History        User Key Date/Time User Provider Type Action    > MR1.1 3/23/2017  2:43 PM Elo Silverman, RN Registered Nurse Sign            Progress Notes by Tracy Doty LSW at 3/23/2017  8:33 AM     Author:  Tracy Doty LSW Service:  Social Work Author Type:      Filed:  3/23/2017  1:18 PM Date of Service:   3/23/2017  8:33 AM Note Created:  3/23/2017  8:33 AM    Status:  Addendum :  Tracy Doty LSW ()         Care Transition Initial Assessment - SW  Reason For Consult: discharge planning  Met with: Patients Chantell cabrera.  Active Problems:    Upper GI bleed         DATA  Lives With: child(agustin), adult  Living Arrangements: condominium  Description of Support System: Supportive, Involved  Who is your support system?: Children  Support Assessment: Adequate family and caregiver support, Adequate social supports.   Identified issues/concerns regarding health management:   Patient feels that they have adequate support @ home?  Yes           Quality Of Family Relationships: supportive, involved  Transportation Available: TBD      ASSESSMENT  Cognitive Status:  Awake, alert and oriented with some confusion.  Concerns to be addressed:     Per automatic referral, SW met with patients daughter/Chantell BREWER, to discuss discharge planning needs.  Patient is a 89-year-old female who was admitted to the hospital on 3-21-17 with a fall and GI Bleed.  Prior to hospitalization, patient was living with her daughter in a condo where the daughter was providing primary care to the patient.  Patients daughter reports that she does not feel that she is able to care for the patient at home any longer and is interested in looking into LTC options.  Patients daughter reports that her mom goes to [facility] 1 day per week and that this would be a convenient location for LTC.  If they don't have a bed available, patients daughter was also okay with [facility] as a back up option.  SW made a referral to the facilities via Discharge on the Double to have them assess patient for a possible admission for tomorrow.  SW will follow up regarding transportation once the discharge plan has been finalized.       PLAN  Financial costs for the patient includes: Discussed coverage for TCU and  LTC.  Patient given options and choices for discharge: TCU facility list offered.  Patient/family is agreeable to the plan?  Yes  Patient Goals and Preferences: TCU vs LTC at discharge.  Patient anticipates discharging to:  TCU vs LTC at discharge.    KLAUS Cervanets  [SB1.1]    CARLO  D: SW following for discharge planning needs.  CARLO received a message from Stephany in Admissions at [facility] stating that they will have a bed available for patient tomorrow on the Rehab Unit and will transition the patient to LTC once a bed becomes available.  I: SW updated patients daughter on the above and will arrange appropriate transportation tomorrow.  A: Patient is alert and oriented with some confusion noted.  P: SW will continue to follow and assist with finalizing the discharge plan as appropriate.    KLAUS Cervantes  [SB1.2]       Revision History        User Key Date/Time User Provider Type Action    > SB1.2 3/23/2017  1:18 PM Tracy Doty LSW  Addend     SB1.1 3/23/2017  8:42 AM Tracy Doty LSW  Sign            Progress Notes by Ingrid Portillo PA-C at 3/23/2017 10:50 AM     Author:  Ingrid Portillo PA-C Service:  Gastroenterology Author Type:  Physician Assistant    Filed:  3/23/2017 10:51 AM Date of Service:  3/23/2017 10:50 AM Note Created:  3/23/2017 10:50 AM    Status:  Signed :  Ingrid Portillo PA-C (Physician Assistant)         GI    Attempted to see patient.  She was sleeping.  Daughter not present.      No further bleeding.      I would continue the PPI BID x 8 weeks then daily indefinitely.  Carafate liquid at night for 14 days.     OK to d/c from a GI standpoint.  Please call if questions.    EVELIN Mcdaniels  [facility]  Office:  [phone] call if needed after 5PM  Cell:  [phone], not available after 5PM at this number[AF1.1]       Revision History        User Key Date/Time User Provider  Type Action    > AF1.1 3/23/2017 10:51 AM Ingrid Portillo PA-C Physician Assistant Sign            Progress Notes by Alex Moses MD at 3/22/2017  3:05 PM     Author:  Alex Moses MD Service:  Hospitalist Author Type:  Physician    Filed:  3/22/2017  3:20 PM Date of Service:  3/22/2017  3:05 PM Note Created:  3/22/2017  3:05 PM    Status:  Addendum :  Alex Moses MD (Physician)         [facility]  Hospitalist Progress Note  Alex Moses MD  03/22/2017    Assessment & Plan   ASSESSMENT: This is a 89-year-old female with end-stage renal disease, osteopenia, hypertension, hypothyroidism and dementia who is presenting today after being found with altered mental status and melena and hemoglobin is several points lower than baseline suggesting acute blood loss. Also, she has acute on chronic renal insufficiency and lactic acidosis suggestive of acute blood loss versus hypovolemia should be admitted for blood transfusion and GI consult and stabilization.       PLAN:   1. Melena secondary to suspected upper GI bleed.   - discontinue asa with risk > benefit in this demented patient.  - hgb down from 9.2 post transfusion to 7.7, 7.9, 7.4.   - continue serial hgb[PD1.1], q 12 hrs[PD1.2]  - conservative approach without EGD/intervention if possible.  - continue IV PPI.  2. History of hypertension.   - resume her metoprolol and amlodipine, hold irbesartan for now.  3. History of hypothyroidism.   - resume 50 mcg a day   4. GERD.   - continue on po Protonix bid for the upper GI bleed and this should help cover GERD.   5. DVT prophylaxis; withholding anticoagulation due to GI bleed would only use PCDs while in bed.   6. Code status: Daughter confirms DNR/DNI.       DISPOSITION:  - to TCU[PD1.1] on 3/24[PD1.2] once hgb stabilizes    Interval History   - chart reviewed  - discussed with nursing  - daughter at bedside  - patient very weak and shaky      -Data reviewed today:  "I reviewed all new labs and imaging over the last 24 hours. I personally reviewed no images or EKG's today.    Physical Exam   Heart Rate: 84, Blood pressure 163/80, pulse 83, temperature 97.7  F (36.5  C), temperature source Oral, resp. rate 16, height 1.6 m (5' 3\"), weight 56.3 kg (124 lb 3.2 oz), SpO2 99 %, not currently breastfeeding.  Vitals:    03/21/17 1146   Weight: 56.3 kg (124 lb 3.2 oz)     Vital Signs with Ranges  Temp:  [96  F (35.6  C)-98.7  F (37.1  C)] 97.7  F (36.5  C)  Pulse:  [83] 83  Heart Rate:  [83-91] 84  Resp:  [16] 16  BP: (146-170)/(56-84) 163/80  SpO2:  [96 %-99 %] 99 %  I/O's Last 24 hours  I/O last 3 completed shifts:  In: 0   Out: 400 [Urine:400]    Constitutional: Awake, alert, cooperative, no apparent distress  Respiratory: Clear to auscultation bilaterally, no crackles or wheezing  Cardiovascular: Regular rate and rhythm, normal S1 and S2, and no murmur noted  GI: Normal bowel sounds, soft, non-distended, non-tender  Skin/Integumen: No rashes, no cyanosis, no edema  Other:      Medications   All medications were reviewed.    NaCl 100 mL/hr at 03/22/17 0821       pantoprazole (PROTONIX) IV PEDS/NICU  40 mg Intravenous BID     sucralfate  1 g Oral At Bedtime     sodium chloride (PF)  3 mL Intracatheter Q8H     brinzolamide  1 drop Left Eye BID     brimonidine-timolol  1 drop Left Eye BID     docusate sodium  100 mg Oral BID     travoprost (MARY Free)  1 drop Left Eye At Bedtime        Data     Recent Labs  Lab 03/22/17  0746 03/22/17  0205 03/21/17  2210  03/21/17  0819   WBC 6.1  --   --   --  8.0   HGB 7.4* 7.9* 7.7*  < > 7.6*   MCV 93  --   --   --  93     --   --   --  260   INR  --   --   --   --  1.05     --   --   --  136   POTASSIUM 3.8  --   --   --  4.0   CHLORIDE 112*  --   --   --  104   CO2 21  --   --   --  21   BUN 56*  --   --   --  56*   CR 1.56*  --   --   --  1.80*   ANIONGAP 8  --   --   --  11   SAMSON 8.0*  --   --   --  7.8*   GLC 93  --   --   --  156* "   ALBUMIN  --   --   --   --  2.6*   PROTTOTAL  --   --   --   --  5.3*   BILITOTAL  --   --   --   --  0.6   ALKPHOS  --   --   --   --  58   ALT  --   --   --   --  11   AST  --   --   --   --  12   TROPI  --   --   --   --  <0.015The 99th percentile for upper reference range is 0.045 ug/L.  Troponin values in the range of 0.045 - 0.120 ug/L may be associated with risks of adverse clinical events.   < > = values in this interval not displayed.    No results found for this or any previous visit (from the past 24 hour(s)).    Alex Moses MD  Pager [phone][PD1.1]          Revision History        User Key Date/Time User Provider Type Action    > PD1.2 3/22/2017  3:20 PM Alex Moses MD Physician Addend     PD1.1 3/22/2017  3:19 PM Alex Moses MD Physician Sign            Progress Notes by Alma Silverio PT at 3/22/2017  3:03 PM     Author:  Alma Silverio PT Service:  (none) Author Type:  Physical Therapist    Filed:  3/22/2017  3:03 PM Date of Service:  3/22/2017  3:03 PM Note Created:  3/22/2017  3:03 PM    Status:  Signed :  Alma Silverio PT (Physical Therapist)          03/22/17 1400   Quick Adds   Type of Visit Initial PT Evaluation   Living Environment   Lives With child(agustin), adult   Living Arrangements condominium   Home Accessibility grab bars present (bathtub);grab bars present (toilet)   Number of Stairs to Enter Home 0   Number of Stairs Within Home 0   Transportation Available family or friend will provide   Living Environment Comment PT: pt lives with her daughter who provides 24 hour assist, stating she only leaves pt alone for short periods of time while pt is napping.    Self-Care   Usual Activity Tolerance fair   Current Activity Tolerance fair   Regular Exercise yes   Activity/Exercise Type strength training   Exercise Amount/Frequency daily   Equipment Currently Used at Home grab bar;shower chair;walker, rolling   Activity/Exercise/Self-Care Comment PT: pt  does upper and lower body strengthening exercises daily, however, pt's daughter reports pt has not been doing the exercises for the last week or so which has resulted in increased weakness.    Functional Level Prior   Ambulation 1-->assistive equipment   Transferring 1-->assistive equipment   Toileting 1-->assistive equipment   Bathing 3-->assistive equipment and person   Dressing 0-->independent   Eating 0-->independent   Communication 0-->understands/communicates without difficulty   Swallowing 0-->swallows foods/liquids without difficulty   Cognition 1 - attention or memory deficits   Fall history within last six months yes   Number of times patient has fallen within last six months 3   Which of the above functional risks had a recent onset or change? none   Prior Functional Level Comment PT: pt's daughter reports pt has had 3 falls in the last year and a half.    General Information   Onset of Illness/Injury or Date of Surgery - Date 03/21/17   Referring Physician Pb Woody MD   Patient/Family Goals Statement pt's daughter would like for pt to go to TCU   Pertinent History of Current Problem (include personal factors and/or comorbidities that impact the POC) Milagros Marie is a 89 year old female with a history of dementia, homocystinemia and renal failure who presents to the emergency department today via EMS and her daughter for evaluation of syncope and rectal bleeding this morning. EMS states that the patient felt fine yesterday, and was ambulating with her walker and acting like herself per the patient's daughter. EMS states that the patient got up this morning at the private residence that she resides at with her daughter, and was found on the ground between the toilet and bathtub by her daughter, prompting 911 to be called.   Precautions/Limitations fall precautions   General Observations PT: pt supine in bed upon arrival with daughter present and agreeable to PT session.    General Info  "Comments Activity: Up with assist   Cognitive Status Examination   Orientation orientation to person, place and time  (able to state she is in the hospital,unable to name hospital)   Level of Consciousness alert   Follows Commands and Answers Questions 100% of the time   Pain Assessment   Patient Currently in Pain No   Strength   Strength Comments PT: BLE strength at least 3/5.   Bed Mobility   Bed Mobility Comments PT: SBA   Transfer Skills   Transfer Comments PT: CGA and FWW   Gait   Gait Comments PT: CGA and FWW   Balance   Balance no deficits were identified   General Therapy Interventions   Planned Therapy Interventions bed mobility training;gait training;strengthening;transfer training;home program guidelines;progressive activity/exercise   Clinical Impression   Criteria for Skilled Therapeutic Intervention yes, treatment indicated   PT Diagnosis generalized weakness and deconditioning.   Influenced by the following impairments generalized weakness, deconditioning, hx of dementia, visual impairments.    Functional limitations due to impairments impaired functional activity tolerance and impaired functional mobility.    Clinical Presentation Stable/Uncomplicated   Clinical Presentation Rationale 1 system assessed - musculoskeletal   Clinical Decision Making (Complexity) Low complexity   Therapy Frequency` daily   Predicted Duration of Therapy Intervention (days/wks) 7 days   Anticipated Equipment Needs at Discharge other (see comments)  (none anticipated )   Anticipated Discharge Disposition Home with Assist;Transitional Care Facility   Risk & Benefits of therapy have been explained Yes   Patient, Family & other staff in agreement with plan of care Yes   [facility] NOSTROMO ICT-PAC TM \"6 Clicks\"   2016, Trustees of [facility], under license to [employer].  All rights reserved.   6 Clicks Short Forms Basic Mobility Inpatient Short Form   [facility] AM-PAC  \"6 Clicks\" V.2 Basic Mobility Inpatient " "Short Form   1. Turning from your back to your side while in a flat bed without using bedrails? 3 - A Little   2. Moving from lying on your back to sitting on the side of a flat bed without using bedrails? 3 - A Little   3. Moving to and from a bed to a chair (including a wheelchair)? 3 - A Little   4. Standing up from a chair using your arms (e.g., wheelchair, or bedside chair)? 3 - A Little   5. To walk in hospital room? 3 - A Little   6. Climbing 3-5 steps with a railing? 3 - A Little   Basic Mobility Raw Score (Score out of 24.Lower scores equate to lower levels of function) 18   Total Evaluation Time   Total Evaluation Time (Minutes) 10[KO1.1]        Revision History        User Key Date/Time User Provider Type Action    > KO1.1 3/22/2017  3:03 PM Alma Silverio, PT Physical Therapist Sign            Progress Notes by Ingrid Portillo PA-C at 3/22/2017  9:36 AM     Author:  Ingrid Portillo PA-C Service:  Gastroenterology Author Type:  Physician Assistant    Filed:  3/22/2017  9:37 AM Date of Service:  3/22/2017  9:36 AM Note Created:  3/22/2017  9:36 AM    Status:  Signed :  Ingrid Portillo PA-C (Physician Assistant)         [facility]  [facility]  Gastroenterology Progress note    Assessment:    1.  GIB, unclear source, consider upper  Plan:    -Continue to monitor Hgb  -Increase PPI to BID  -Add Carafate at night  -OK to start liquids  -Hold on EGD as risk greater than benefit.  Daughter agrees.          Interval History:      Patient without bleeding.  Daughter present.        Vital Signs:      /73 (BP Location: Left arm)  Pulse 83  Temp 98.5  F (36.9  C) (Oral)  Resp 16  Ht 1.6 m (5' 3\")  Wt 56.3 kg (124 lb 3.2 oz)  SpO2 96%  BMI 22 kg/m2  Temp (24hrs), Av.8  F (36.6  C), Min:96.3  F (35.7  C), Max:98.7  F (37.1  C)    Patient Vitals for the past 72 hrs:   Weight   17 1146 56.3 kg (124 lb 3.2 oz) "       Intake/Output Summary (Last 24 hours) at 03/22/17 0936  Last data filed at 03/22/17 0800   Gross per 24 hour   Intake                0 ml   Output              400 ml   Net             -400 ml         Constitutional: NAD, comfortable    Additional Comments:  ROS, FH, SH: See initial GI consult for details.    Laboratory Data:  Recent Labs   Lab Test  03/22/17   0746  03/22/17   0205  03/21/17   2210   03/21/17   0819  08/19/16   1815   WBC  6.1   --    --    --   8.0  8.9   HGB  7.4*  7.9*  7.7*   < >  7.6*  10.8*   MCV  93   --    --    --   93  94   PLT  188   --    --    --   260  301   INR   --    --    --    --   1.05   --     < > = values in this interval not displayed.     Recent Labs   Lab Test  03/22/17   0746  03/21/17   0819  11/24/16   1942   NA  141  136  128*   POTASSIUM  3.8  4.0  4.2   CHLORIDE  112*  104  94   CO2  21  21  25   BUN  56*  56*  27   CR  1.56*  1.80*  1.51*   ANIONGAP  8  11  9   SAMSON  8.0*  7.8*  8.6     Recent Labs   Lab Test  03/21/17   0855  03/21/17   0819  10/06/16   1201  08/19/16   1815   06/12/09   0840   ALBUMIN   --   2.6*  4.3  3.6   < >   --    BILITOTAL   --   0.6  0.8  0.6   < >   --    ALT   --   11  8  12   < >   --    AST   --   12  13  15   < >   --    ALKPHOS   --   58  84  83   < >   --    PROTEIN  30*   --    --    --    --   Negative    < > = values in this interval not displayed.             EVELIN Mcdaniels  [facility]  Office:  [phone] call if needed after 5PM  Cell:  [phone], not available after 5PM at this number[AF1.1]       Revision History        User Key Date/Time User Provider Type Action    > AF1.1 3/22/2017  9:37 AM Ingrid Portillo PA-C Physician Assistant Sign            Progress Notes by Donny Boyd MD at 3/22/2017  2:19 AM     Author:  Donny Boyd MD Service:  Hospitalist Author Type:  Physician    Filed:  3/22/2017  2:25 AM Date of Service:  3/22/2017  2:19 AM Note Created:  3/22/2017  2:19 AM     Status:  Signed :  Donny Boyd MD (Physician)         Hospitalist service cross-cover note:     Called by RN regarding hemoglobin of 7.7 g/dl, was 7.6 g/dl on admission, received 1 unit pRBC and recheck was 9.2 g/dl. Hemodynamically stable and unchanged from admission. By time hemoglobin result was communicated, was due for recheck in 1 hour. Recheck at 7.9 g/dl. Given stability on repeat would continue to monitor without repeat transfusion (next check due at 0600, unless evidence of active bleed or changes in hemodynamics.      Donny Boyd MD   Hospitalist  [phone][IM1.1]         Revision History        User Key Date/Time User Provider Type Action    > IM1.1 3/22/2017  2:25 AM Donny Boyd MD Physician Sign            Progress Notes by Itzel Junior at 3/21/2017  1:27 PM     Author:  Itzel Junior Service:  Spiritual Health Author Type:      Filed:  3/21/2017  1:31 PM Date of Service:  3/21/2017  1:27 PM Note Created:  3/21/2017  1:27 PM    Status:  Signed :  Itzel Junior ()         SPIRITUAL HEALTH SERVICES  SPIRITUAL ASSESSMENT Progress Note    Station 88    PRIMARY FOCUS:     Support for coping    ILLNESS CIRCUMSTANCES:   Reviewed documentation. Reflective conversation shared with pt and her daughter which integrated elements of illness and family narratives.     Context of Serious Illness/Symptom(s) - Pt fell at home in her bathroom in the night.  She was receiving a transfusion when I arrived, and pt's daughter said they're waiting for other test results.    Resources for Support - Pt's daughter Chantell lives with her.  Pt is , and her only other child (a son)  six years ago.    DISTRESS:     Emotional/Existential/Relational Distress - Pt/family did not seem distressed during my visit.    Spiritual/Muslim Distress - Unknown.    Social/Cultural/Economic Distress - Unknown.    SPIRITUAL/Bahai COPING:     Taoism/Kendra - [employer]--[facility]  "in [place], although pt no longer drives and isn't able to attend [employer] very often    Spiritual Practice(s) - Prayer, which I shared with the pt and her daughter.    Emotional/Existential/Relational Connections - Not discussed.    GOALS OF CARE:    Goals of Care - Pt's daughter is hoping for some answers as to what's ailing the pt.    Meaning/Sense-Making - Pt herself said, \"I'm 89.  I've lived a long time and I'm ready to go anytime.  PLAN:  SH team will continue to be available.  I will visit again in the next 1-2 days if pt remains hospitalized.      Amaya Junior  Staff   Pager [phone][PL1.1]     Revision History        User Key Date/Time User Provider Type Action    > PL1.1 3/21/2017  1:31 PM Itzel Junior  Sign            ED Notes by Ning Rees RN at 3/21/2017 10:03 AM     Author:  Ning Rees RN Service:  (none) Author Type:  Registered Nurse    Filed:  3/21/2017 10:06 AM Date of Service:  3/21/2017 10:03 AM Note Created:  3/21/2017 10:03 AM    Status:  Signed :  Ning Rees RN (Registered Nurse)         [facility]  ED Nurse Handoff Report    ED Chief complaint: Rectal Bleeding (Patient brought in by EMS from home. Patient lives at home with daughter. Patient woke up, used bathroom and had decreased LOC on toilet. Per EMS patient had blood in the stool. Complains of poain when abdomen is palpated. )      ED Diagnosis:   Final diagnoses:   None       Code Status: Full Code    Allergies:   Allergies   Allergen Reactions     Sulfa Drugs        Activity level:  Total Care     Needed?: No    Isolation: No  Infection: Not Applicable    Bariatric?: No      Vital Signs:   Vitals:    03/21/17 0830 03/21/17 0845 03/21/17 0959 03/21/17 1000   BP: 123/61 132/76 145/64    Resp: 17 10     Temp:       TempSrc:       SpO2: 99% 99% 99% 98%       Cardiac Rhythm: ,   Cardiac  Cardiac Rhythm: Other (Comment) (sinus rhythm)  Ectopy: " None    Pain level: 0-10 Pain Scale: 0    Is this patient confused?: Yes    Patient Report: Initial Complaint: Fall  Focused Assessment: pt had fallen in bathroom between toilet and bath tub at home. Pt was found to have occult stool, GI bleed.   Tests Performed: CT head, abd, labs, EKG  Abnormal Results: see results  Treatments provided: protonix gtt    Family Comments: daughter at bedside    OBS brochure/video discussed/provided to patient: N/A    ED Medications:   Medications   0.9% sodium chloride infusion (1,000 mLs Intravenous New Bag 3/21/17 0910)   pantoprazole (PROTONIX) 80 mg in NaCl 0.9 % 100 mL infusion (8 mg/hr Intravenous New Bag 3/21/17 0957)   0.9% sodium chloride BOLUS (0 mLs Intravenous Stopped 3/21/17 0910)   pantoprazole (PROTONIX) 80 mg in NaCl 0.9 % 100 mL intermittent infusion (80 mg Intravenous New Bag 3/21/17 0953)       Drips infusing?:  Yes      ED NURSE PHONE NUMBER: [phone][MN1.1]            Revision History        User Key Date/Time User Provider Type Action    > MN1.1 3/21/2017 10:06 AM Ning Rees RN Registered Nurse Sign            ED Notes by Caryn Calero RN at 3/21/2017  9:33 AM     Author:  Caryn Calero RN Service:  (none) Author Type:  Registered Nurse    Filed:  3/21/2017  9:33 AM Date of Service:  3/21/2017  9:33 AM Note Created:  3/21/2017  9:33 AM    Status:  Signed :  Caryn Calero RN (Registered Nurse)         Critical Care Time (RN) Mins: 82 minutes.[CK1.1]     Revision History        User Key Date/Time User Provider Type Action    > CK1.1 3/21/2017  9:33 AM Caryn Calero RN Registered Nurse Sign            ED Notes by Caryn Calero RN at 3/21/2017  9:33 AM     Author:  Caryn Calero RN Service:  (none) Author Type:  Registered Nurse    Filed:  3/21/2017  9:33 AM Date of Service:  3/21/2017  9:33 AM Note Created:  3/21/2017  9:33 AM    Status:  Signed :  Caryn Calero, RN (Registered Nurse)         Bed:  ED04  Expected date:   Expected time:   Means of arrival:   Comments:  stabe 1     Revision History        User Key Date/Time User Provider Type Action    > CK1.1 3/21/2017  9:33 AM Caryn Calero RN Registered Nurse Sign            ED Notes by Caryn Calero RN at 3/21/2017  8:25 AM     Author:  Caryn Calero RN Service:  (none) Author Type:  Registered Nurse    Filed:  3/21/2017  8:35 AM Date of Service:  3/21/2017  8:25 AM Note Created:  3/21/2017  8:35 AM    Status:  Signed :  Caryn Calero RN (Registered Nurse)         Dr. Looney at bedside performing bedside ultrasound of abdomen.[CK1.1]     Revision History        User Key Date/Time User Provider Type Action    > CK1.1 3/21/2017  8:35 AM Caryn Calero RN Registered Nurse Sign            ED Notes by Caryn Calero RN at 3/21/2017  8:34 AM     Author:  Caryn Calero RN Service:  (none) Author Type:  Registered Nurse    Filed:  3/21/2017  8:34 AM Date of Service:  3/21/2017  8:34 AM Note Created:  3/21/2017  8:34 AM    Status:  Signed :  Caryn Calero RN (Registered Nurse)         Family at bedside.[CK1.1]     Revision History        User Key Date/Time User Provider Type Action    > CK1.1 3/21/2017  8:34 AM Caryn Calero RN Registered Nurse Sign            ED Notes by Elo Bustillo RN at 3/21/2017  8:12 AM     Author:  Elo Bustillo RN Service:  (none) Author Type:  Registered Nurse    Filed:  3/21/2017  8:12 AM Date of Service:  3/21/2017  8:12 AM Note Created:  3/21/2017  8:12 AM    Status:  Signed :  Elo Bustillo RN (Registered Nurse)         Bed: ST01  Expected date:   Expected time:   Means of arrival:   Comments:  421  89 F GI bleed, altered   0812     Revision History        User Key Date/Time User Provider Type Action    > MV1.1 3/21/2017  8:12 AM Van Grinsven, Bonny, RN Registered Nurse Sign                  Procedure Notes     No notes of this type exist for this  encounter.         Progress Notes - Therapies (Notes from 03/20/17 through 03/23/17)      Progress Notes by Alma Silverio PT at 3/22/2017  3:03 PM     Author:  Alma Silverio PT Service:  (none) Author Type:  Physical Therapist    Filed:  3/22/2017  3:03 PM Date of Service:  3/22/2017  3:03 PM Note Created:  3/22/2017  3:03 PM    Status:  Signed :  Alma Silverio PT (Physical Therapist)          03/22/17 1400   Quick Adds   Type of Visit Initial PT Evaluation   Living Environment   Lives With child(agustin), adult   Living Arrangements condominium   Home Accessibility grab bars present (bathtub);grab bars present (toilet)   Number of Stairs to Enter Home 0   Number of Stairs Within Home 0   Transportation Available family or friend will provide   Living Environment Comment PT: pt lives with her daughter who provides 24 hour assist, stating she only leaves pt alone for short periods of time while pt is napping.    Self-Care   Usual Activity Tolerance fair   Current Activity Tolerance fair   Regular Exercise yes   Activity/Exercise Type strength training   Exercise Amount/Frequency daily   Equipment Currently Used at Home grab bar;shower chair;walker, rolling   Activity/Exercise/Self-Care Comment PT: pt does upper and lower body strengthening exercises daily, however, pt's daughter reports pt has not been doing the exercises for the last week or so which has resulted in increased weakness.    Functional Level Prior   Ambulation 1-->assistive equipment   Transferring 1-->assistive equipment   Toileting 1-->assistive equipment   Bathing 3-->assistive equipment and person   Dressing 0-->independent   Eating 0-->independent   Communication 0-->understands/communicates without difficulty   Swallowing 0-->swallows foods/liquids without difficulty   Cognition 1 - attention or memory deficits   Fall history within last six months yes   Number of times patient has fallen within last six months 3   Which of  the above functional risks had a recent onset or change? none   Prior Functional Level Comment PT: pt's daughter reports pt has had 3 falls in the last year and a half.    General Information   Onset of Illness/Injury or Date of Surgery - Date 03/21/17   Referring Physician Pb Woody MD   Patient/Family Goals Statement pt's daughter would like for pt to go to TCU   Pertinent History of Current Problem (include personal factors and/or comorbidities that impact the POC) Milagros Marie is a 89 year old female with a history of dementia, homocystinemia and renal failure who presents to the emergency department today via EMS and her daughter for evaluation of syncope and rectal bleeding this morning. EMS states that the patient felt fine yesterday, and was ambulating with her walker and acting like herself per the patient's daughter. EMS states that the patient got up this morning at the private residence that she resides at with her daughter, and was found on the ground between the toilet and bathtub by her daughter, prompting 911 to be called.   Precautions/Limitations fall precautions   General Observations PT: pt supine in bed upon arrival with daughter present and agreeable to PT session.    General Info Comments Activity: Up with assist   Cognitive Status Examination   Orientation orientation to person, place and time  (able to state she is in the hospital,unable to name hospital)   Level of Consciousness alert   Follows Commands and Answers Questions 100% of the time   Pain Assessment   Patient Currently in Pain No   Strength   Strength Comments PT: BLE strength at least 3/5.   Bed Mobility   Bed Mobility Comments PT: SBA   Transfer Skills   Transfer Comments PT: CGA and FWW   Gait   Gait Comments PT: CGA and FWW   Balance   Balance no deficits were identified   General Therapy Interventions   Planned Therapy Interventions bed mobility training;gait training;strengthening;transfer training;home  "program guidelines;progressive activity/exercise   Clinical Impression   Criteria for Skilled Therapeutic Intervention yes, treatment indicated   PT Diagnosis generalized weakness and deconditioning.   Influenced by the following impairments generalized weakness, deconditioning, hx of dementia, visual impairments.    Functional limitations due to impairments impaired functional activity tolerance and impaired functional mobility.    Clinical Presentation Stable/Uncomplicated   Clinical Presentation Rationale 1 system assessed - musculoskeletal   Clinical Decision Making (Complexity) Low complexity   Therapy Frequency` daily   Predicted Duration of Therapy Intervention (days/wks) 7 days   Anticipated Equipment Needs at Discharge other (see comments)  (none anticipated )   Anticipated Discharge Disposition Home with Assist;Transitional Care Facility   Risk & Benefits of therapy have been explained Yes   Patient, Family & other staff in agreement with plan of care Yes   [facility] [employer] TM \"6 Clicks\"   2016, Trustees of [facility], under license to [employer].  All rights reserved.   6 Clicks Short Forms Basic Mobility Inpatient Short Form   [facility] AM-PAC  \"6 Clicks\" V.2 Basic Mobility Inpatient Short Form   1. Turning from your back to your side while in a flat bed without using bedrails? 3 - A Little   2. Moving from lying on your back to sitting on the side of a flat bed without using bedrails? 3 - A Little   3. Moving to and from a bed to a chair (including a wheelchair)? 3 - A Little   4. Standing up from a chair using your arms (e.g., wheelchair, or bedside chair)? 3 - A Little   5. To walk in hospital room? 3 - A Little   6. Climbing 3-5 steps with a railing? 3 - A Little   Basic Mobility Raw Score (Score out of 24.Lower scores equate to lower levels of function) 18   Total Evaluation Time   Total Evaluation Time (Minutes) 10[KO1.1]        Revision History        User Key Date/Time " User Provider Type Action    > KO1.1 3/22/2017  3:03 PM Alma Silverio, PT Physical Therapist Sign

## 2017-03-21 NOTE — PROGRESS NOTES
"SPIRITUAL HEALTH SERVICES  SPIRITUAL ASSESSMENT Progress Note    [address]    PRIMARY FOCUS:     Support for coping    ILLNESS CIRCUMSTANCES:   Reviewed documentation. Reflective conversation shared with pt and her daughter which integrated elements of illness and family narratives.     Context of Serious Illness/Symptom(s) - Pt fell at home in her bathroom in the night.  She was receiving a transfusion when I arrived, and pt's daughter said they're waiting for other test results.    Resources for Support - Pt's daughter Chantell lives with her.  Pt is , and her only other child (a son)  six years ago.    DISTRESS:     Emotional/Existential/Relational Distress - Pt/family did not seem distressed during my visit.    Spiritual/Mandaeism Distress - Unknown.    Social/Cultural/Economic Distress - Unknown.    SPIRITUAL/Jew COPING:     Restoration/Kendra - [employer]ism--[facility] in [place], although pt no longer drives and isn't able to attend [employer] very often    Spiritual Practice(s) - Prayer, which I shared with the pt and her daughter.    Emotional/Existential/Relational Connections - Not discussed.    GOALS OF CARE:    Goals of Care - Pt's daughter is hoping for some answers as to what's ailing the pt.    Meaning/Sense-Making - Pt herself said, \"I'm 89.  I've lived a long time and I'm ready to go anytime.  PLAN:  SH team will continue to be available.  I will visit again in the next 1-2 days if pt remains hospitalized.      Amaya Junior  Staff   Pager [phone]  "

## 2017-03-21 NOTE — IP AVS SNAPSHOT
"[place] [address] [facility]: [phone]                                              INTERAGENCY TRANSFER FORM - PHYSICIAN ORDERS   3/21/2017                    Hospital Admission Date: 3/21/2017  CHELLY RENAE   : 1923  Sex: Female        Attending Provider: Pb Woody MD     Allergies:  Sulfa Drugs    Infection:  None   Service:  HOSPITALIST    Ht:  1.6 m (5' 3\")   Wt:  56.3 kg (124 lb 3.2 oz)   Admission Wt:  56.3 kg (124 lb 3.2 oz)    BMI:  22 kg/m 2   BSA:  1.58 m 2            Patient PCP Information     Provider PCP Type    Maria Eugenia Holley MD General      ED Clinical Impression     Diagnosis Description Comment Added By Time Added    GI bleed [K92.2] GI bleed [K92.2]  Leslie Boo 3/21/2017 10:23 AM      Hospital Problems as of 3/24/2017              Priority Class Noted POA    Upper GI bleed Medium  3/21/2017 Yes      Non-Hospital Problems as of 3/24/2017              Priority Class Noted    Preventative health care   2011    Fatigue   2011    Osteopenia   2011    ACP (advance care planning)   Unknown    Renal failure   Unknown    Health Care Home   2013    Advanced directives, counseling/discussion   2015    Hypothyroidism due to acquired atrophy of thyroid Medium  10/6/2016    Benign essential hypertension Medium  10/6/2016      Code Status History     Date Active Date Inactive Code Status Order ID Comments User Context    This patient has a current code status but no historical code status.         Medication Review      START taking        Dose / Directions Comments    ferrous gluconate 324 (38 FE) MG tablet   Commonly known as:  FERGON        Dose:  324 mg   Take 1 tablet (324 mg) by mouth 2 times daily   Quantity:  100 tablet   Refills:  1        pantoprazole 40 MG EC tablet   Commonly known as:  PROTONIX        Take one tab orally twice a day for 8 weeks, then daily indefinitely,  Take on empty stomach about half an hour " before eating.   Quantity:  60 tablet   Refills:  2        sucralfate 1 GM/10ML suspension   Commonly known as:  CARAFATE        Dose:  1 g   Take 10 mLs (1 g) by mouth At Bedtime for 14 days   Quantity:  140 mL   Refills:  0          CONTINUE these medications which have NOT CHANGED        Dose / Directions Comments    ACETAMINOPHEN EXTRA STRENGTH 500 MG tablet   Generic drug:  acetaminophen        Dose:  500 mg   Take 500 mg by mouth 3 times daily   Refills:  0        ADVANCED PROBIOTIC Caps        Dose:  1 tablet   Take 1 tablet by mouth daily (with lunch)   Refills:  0        ALLEGRA PO        Dose:  180 mg   Take 180 mg by mouth daily.   Refills:  0        AMLODIPINE BESYLATE PO        Dose:  10 mg   Take 10 mg by mouth daily   Refills:  0        AZOPT 1 % ophthalmic susp   Generic drug:  brinzolamide        Dose:  1 drop   Place 1 drop Into the left eye 2 times daily   Refills:  4        CALCIUM 600 + D PO        Dose:  1 tablet   Take 1 tablet by mouth daily   Refills:  0        cephALEXin 500 MG capsule   Commonly known as:  KEFLEX        Dose:  2000 mg   Take 2,000 mg by mouth as needed Administer one hour prior to dental procedure   Refills:  99        COMBIGAN 0.2-0.5 % ophthalmic solution   Generic drug:  brimonidine-timolol        Dose:  1 drop   Place 1 drop Into the left eye 2 times daily   Refills:  4        irbesartan 300 MG tablet   Commonly known as:  AVAPRO   Used for:  Essential hypertension        Dose:  150 mg   Take 0.5 tablets (150 mg) by mouth 2 times daily   Quantity:  30 tablet   Refills:  0        levothyroxine 50 MCG tablet   Commonly known as:  SYNTHROID/LEVOTHROID   Used for:  Hypothyroidism due to acquired atrophy of thyroid        TAKE 1 TABLET (50 MCG) BY MOUTH DAILY   Quantity:  90 tablet   Refills:  2        Magnesium 500 MG Caps   Indication:  Low Amount of Magnesium in the Blood        Dose:  1 tablet   Take 1 tablet by mouth daily Daughter does not know salt form   Refills:  0         metoprolol 50 MG tablet   Commonly known as:  LOPRESSOR   Used for:  Encounter for medication refill        TAKE 1 AND 1/2 TABLET TWICE DAILY WITH FOOD OR MILK   Quantity:  270 tablet   Refills:  3        TRAVATAN 0.004 % ophthalmic solution   Generic drug:  travoprost Z (benzalkonium)        Dose:  1 drop   1 drop. One drop in left eye at bedtime.   Refills:  0          STOP taking     aspirin 81 MG chewable tablet                     Further instructions from your care team       You have been discharged to [facility]  Phone Number is [phone]  Fax Number is [phone]    After Care     Activity - Up with nursing assistance           Advance Diet as Tolerated       Follow this diet upon discharge: Orders Placed This Encounter  Mechanical soft       General info for SNF       Length of Stay Estimate: Short Term Care: Estimated # of Days <30  Condition at Discharge: Improving  Level of care:skilled   Rehabilitation Potential: Excellent  Admission H&P remains valid and up-to-date: Yes  Recent Chemotherapy: N/A  Use Nursing Home Standing Orders: Yes       Mantoux instructions       Give two-step Mantoux (PPD) Per Facility Policy Yes             Referrals     Occupational Therapy Adult Consult       Evaluate and treat as clinically indicated.    Reason:  Weakness       Physical Therapy Adult Consult       Evaluate and treat as clinically indicated.    Reason:  weakness             Follow-Up Appointment Instructions     Future Labs/Procedures    Follow Up and recommended labs and tests     Comments:    Follow-up with nursing home physician in 3-5 days, CBC, BMP at that time.  Follow-up with renal in approximately 6 months as per their recommendation      Follow-Up Appointment Instructions     Follow Up and recommended labs and tests       Follow-up with nursing home physician in 3-5 days, CBC, BMP at that time.  Follow-up with renal in approximately 6 months as per their recommendation              Statement of Approval     Ordered          03/24/17 1120  I have reviewed and agree with all the recommendations and orders detailed in this document.  EFFECTIVE NOW     Approved and electronically signed by:  Lainey Fall MD

## 2017-03-21 NOTE — CONSULTS
[facility]    Gastroenterology Consultation    Date of Admission:  3/21/2017  Date of Consult (When I saw the patient): 03/21/17    Assessment & Plan   Milagros Marie is a 89 year old female who was admitted on 3/21/2017. I was asked to see the patient for melena.    Melena  Drop in hgb  Aspirin use  Advanced age  Dementia    The site of bleeding is likely an upper GI bleed, such as gastric or duodenal ulcer, hemorrhagic gastritis/duodenitis, neoplasm vs small bowel lesion or right sided colonic.  She is demented and would require anesthesia for sedation, whether MAC or intubated, per their discretion. She is at higher risk for procedural risks, due to her age.  Her daughter, by the bedside, agrees that following a conservative course for treatment is a good objective, in this circumstance.    PLAN:  Supportive care.  IV PPI  Follow clinically.  Transfuse if needed.  Hold aspirin      Fatou Marley MD  [facility]  Office: [phone]  Cell:  [phone]  Monday through Thursday 8am to 5pm, Friday 8am to 4pm    Reason for Consult   Reason for consult: I was asked by Dr. Pb Woody to evaluate this patient for GI bleeding.    Primary Care Physician   Maria Eugenia Holley    Chief Complaint   Intestinal bleeding    History is obtained from the patient and medical records    History of Present Illness   Milagros Marie is a 89 year old female who presents with melena.   The patient is demented, and therefore the daughter provided the history.  The patient lives with her daughter, who found her slumped over the toilet. EMS for notified, and when they arrived, they noticed tarry stools in the toilet. In the ER, her hgb was low at 7.6. She passed more melena in the ER, where her vital signs were initially soft.   The vital signs normalized with fluids.  She takes aspirin chronically for a history of a suspected TIA or CVI in the past. She takes no other blood thinning agents or  anticoagulants.    Past Medical History    I have reviewed this patient's medical history and updated it with pertinent information if needed.   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  Past Surgical History   Procedure Laterality Date     Joint replacemtn, knee rt/lt       Joint Replacement knee RT/LT     Hysterectomy       tabso     Carpal tunnel release rt/lt       Cataract iol, rt/lt       Cataract IOL RT/LT     Blepharoplasty       Eye surgery       Bladder surgery         Prior to Admission Medications   Prior to Admission Medications   Prescriptions Last Dose Informant Patient Reported? Taking?   AMLODIPINE BESYLATE PO 3/20/2017 at 1200 Daughter Yes Yes   Sig: Take 10 mg by mouth daily   AZOPT 1 % ophthalmic suspension 3/20/2017 at pm Daughter Yes Yes   Sig: Place 1 drop Into the left eye 2 times daily    COMBIGAN 0.2-0.5 % ophthalmic solution 3/20/2017 at pm Daughter Yes Yes   Sig: Place 1 drop Into the left eye 2 times daily   Calcium Carbonate-Vitamin D (CALCIUM 600 + D OR) 3/20/2017 at Unknown time Daughter Yes Yes   Sig: Take 1 tablet by mouth daily    Fexofenadine HCl (ALLEGRA PO) 3/20/2017 at pm Daughter Yes Yes   Sig: Take 180 mg by mouth daily.    Magnesium 500 MG CAPS 3/20/2017 at Unknown time Daughter Yes Yes   Sig: Take 1 tablet by mouth daily Daughter does not know salt form   Probiotic Product (ADVANCED PROBIOTIC) CAPS 3/20/2017 at 1200 Daughter Yes Yes   Sig: Take 1 tablet by mouth daily (with lunch)    acetaminophen (ACETAMINOPHEN EXTRA  STRENGTH) 500 MG tablet 3/20/2017 at Unknown time Daughter Yes Yes   Sig: Take 500 mg by mouth 3 times daily    aspirin 81 MG chewable tablet 3/20/2017 at pm Daughter Yes Yes   Sig: Take 1 tablet (81 mg) by mouth daily   cephALEXin (KEFLEX) 500 MG capsule  Daughter Yes No   Sig: Take 2,000 mg by mouth as needed Administer one hour prior to dental procedure   irbesartan (AVAPRO) 300 MG tablet 3/20/2017 at pm Daughter No Yes   Sig: Take 0.5 tablets (150 mg) by mouth 2 times daily   levothyroxine (SYNTHROID/LEVOTHROID) 50 MCG tablet 3/20/2017 at am Daughter No Yes   Sig: TAKE 1 TABLET (50 MCG) BY MOUTH DAILY   metoprolol (LOPRESSOR) 50 MG tablet 3/20/2017 at pm Daughter No Yes   Sig: TAKE 1 AND 1/2 TABLET TWICE DAILY WITH FOOD OR MILK   travoprost Z, benzalkonium, (TRAVATAN) 0.004 % ophthalmic solution 3/20/2017 at 2200 Daughter Yes Yes   Si drop. One drop in left eye at bedtime.       Facility-Administered Medications: None     Allergies   Allergies   Allergen Reactions     Sulfa Drugs        Social History   I have reviewed this patient's social history and updated it with pertinent information if needed. Milagros Marie  reports that she has never smoked. She has never used smokeless tobacco. She reports that she drinks alcohol.    Family History   I have reviewed this patient's family history and updated it with pertinent information if needed.   Family History   Problem Relation Age of Onset     HEART DISEASE Mother      HEART DISEASE Father      Cancer - colorectal Sister      CANCER Sister        Review of Systems   The 10 point Review of Systems is negative other than noted in the HPI or here.     Physical Exam   Temp: 98.1  F (36.7  C) Temp src: Oral BP: 170/84 Pulse: 83 Heart Rate: 83 Resp: 16 SpO2: 99 % O2 Device: None (Room air)    Vital Signs with Ranges  Temp:  [96.3  F (35.7  C)-98.3  F (36.8  C)] 98.1  F (36.7  C)  Pulse:  [77-83] 83  Heart Rate:  [71-83] 83  Resp:  [10-27] 16  BP:  (116-170)/(56-84) 170/84  SpO2:  [98 %-99 %] 99 %  124 lbs 3.2 oz      Constitutional: Awake, confused, cooperative, no apparent distress.  Eyes: Conjunctiva and pupils examined and normal.  HEENT: Moist mucous membranes, normal dentition.  Respiratory: Clear to auscultation bilaterally, no crackles or wheezing.  Cardiovascular: Regular rate and rhythm, normal S1 and S2, and no murmur noted.  GI: Soft, non-distended, non-tender, normal bowel sounds.  Lymph/Hematologic: No anterior cervical or supraclavicular adenopathy.  Skin: No rashes, no cyanosis, no edema.  Musculoskeletal: No joint swelling, erythema or tenderness.  Neurologic: Cranial nerves 2-12 intact, normal strength and sensation.  Psychiatric: Confused, no obvious anxiety or depression.  Extr: no CCE.    Data   -Data reviewed today: All pertinent laboratory and imaging results from this encounter were reviewed.    Recent Labs  Lab 03/21/17  0819   WBC 8.0   HGB 7.6*   MCV 93      INR 1.05      POTASSIUM 4.0   CHLORIDE 104   CO2 21   BUN 56*   CR 1.80*   ANIONGAP 11   SAMSON 7.8*   *   ALBUMIN 2.6*   PROTTOTAL 5.3*   BILITOTAL 0.6   ALKPHOS 58   ALT 11   AST 12     No results for input(s): IRON, IRONSAT, RETICABSCT, RETP, FEB, LEXA, B12, FOLIC, EPOE, MORPH in the last 168 hours.    Recent Results (from the past 24 hour(s))   POC US ABDOMEN LIMITED    Impression    [facility] Procedure Note      Limited Bedside ED Aorta Ultrasound:    PROCEDURE: PERFORMED BY: Dr. Nickolas Looney  INDICATIONS:  Abdominal Pain    PROBE:  Low frequency convex probe  BODY LOCATION: Abdomen  FINDINGS:  The ultrasound was performed using transverse views.   Normal: Abdominal aorta < 4 cm.  MEASUREMENT:  2.6 cm   No evidence of free fluid in hepatorenal (Morison s pouch), perisplenic, or and pelvic areas. No evidence of pericardial effusion.  INTERPRETATION:  The evaluation of the aorta was of normal caliber (ie < 4cm in the transverse/longitudinal views)  without evidence of aneurysm or dilation.  Aorta visualized in entirety from insertion into the intra-abdominal cavity to bifurcation into iliac vessels. There was no abdominal free fluid.  IMAGE DOCUMENTATION: Images were archived to PACs system.     Chest  XR, 1 view PORTABLE    Narrative    XR CHEST PORT 1 VW 3/21/2017 9:11 AM    HISTORY: syncope      Impression    IMPRESSION: Moderate sized esophageal hiatal hernia. Minimal  interstitial infiltrate left lung base which may be acute or chronic.  No other findings.    KIRSTIN HARRIS MD   Head CT w/o contrast    Narrative    CT SCAN OF THE HEAD WITHOUT CONTRAST March 21, 2017 9:35 AM     HISTORY: Altered mentation, syncope.    TECHNIQUE:  Axial images of the head and coronal reformations without  IV contrast material. Radiation dose for this scan was reduced using  automated exposure control, adjustment of the mA and/or kV according  to patient size, or iterative reconstruction technique.    COMPARISON: MR dated 10/9/2015.    FINDINGS: Mild to moderate cerebral atrophy is present. There are some  minimal patchy white matter changes in both hemispheres without mass  effect. There is no evidence for intracranial hemorrhage, mass effect,  acute infarct, or skull fracture.      Impression    IMPRESSION: Chronic changes. No evidence for intracranial hemorrhage  or any acute process.    JA SERRA MD   Abd/pelvis CT no contrast - Stone Protocol    Narrative    CT ABDOMEN AND PELVIS WITHOUT CONTRAST  3/21/2017 9:35 AM     HISTORY: Abdomen pain and melena.  Chronic kidney disease.    TECHNIQUE: Noncontrast CT abdomen and pelvis was performed. Radiation  dose for this scan was reduced using automated exposure control,  adjustment of the mA and/or kV according to patient size, or iterative  reconstruction technique.    COMPARISON: CT abdomen and pelvis 6/12/2009.    FINDINGS: There is a moderate hiatal hernia that is more distended  than the prior exam. Left renal atrophy.  A hyperdense lesion at the  anterior medial right mid kidney is 0.6 cm and appears new on image  24. There is a cyst posteriorly at this same image, and likely other  very ill-defined right renal cysts. Cholelithiasis. There is no acute  abnormality involving the liver, spleen, adrenals, or pancreas  identified at unenhanced scanning. There is a hepatic cyst again  identified, image 25, at the anterior lower liver. No hydronephrosis.  No evidence for bowel obstruction. Stool distends the rectosigmoid  colon. Distal colonic diverticulosis without diverticulitis. There is  also moderate stool distending the more proximal colon. Vascular  calcifications. There is no abscess or free air identified. There is a  degree of subcutaneous edema.      Impression    IMPRESSION:  1. No convincing acute abnormality is seen.  2. Stool distends the colon.  3. Cholelithiasis.  4. Moderate hiatal hernia is more distended than in 2009.  5. An indeterminate new hyperdense medial right renal lesion is  subcentimeter. This may just be a small hemorrhagic cyst. A solid  lesion is not completely excluded.  6. Nonspecific mild subcutaneous edema diffusely.  7. Diffuse vascular calcifications.    MANPREET WATKINS MD

## 2017-03-21 NOTE — ED NOTES
Bed: ST01  Expected date:   Expected time:   Means of arrival:   Comments:  421  89 F GI bleed, altered   0812

## 2017-03-22 ENCOUNTER — APPOINTMENT (OUTPATIENT)
Dept: PHYSICAL THERAPY | Facility: CLINIC | Age: 82
DRG: 377 | End: 2017-03-22
Attending: INTERNAL MEDICINE
Payer: MEDICARE

## 2017-03-22 LAB
ANION GAP SERPL CALCULATED.3IONS-SCNC: 8 MMOL/L (ref 3–14)
BUN SERPL-MCNC: 56 MG/DL (ref 7–30)
CALCIUM SERPL-MCNC: 8 MG/DL (ref 8.5–10.1)
CHLORIDE SERPL-SCNC: 112 MMOL/L (ref 94–109)
CO2 SERPL-SCNC: 21 MMOL/L (ref 20–32)
CREAT SERPL-MCNC: 1.56 MG/DL (ref 0.52–1.04)
ERYTHROCYTE [DISTWIDTH] IN BLOOD BY AUTOMATED COUNT: 15.5 % (ref 10–15)
GFR SERPL CREATININE-BSD FRML MDRD: 31 ML/MIN/1.7M2
GLUCOSE SERPL-MCNC: 93 MG/DL (ref 70–99)
HCT VFR BLD AUTO: 21.6 % (ref 35–47)
HGB BLD-MCNC: 7.4 G/DL (ref 11.7–15.7)
HGB BLD-MCNC: 7.9 G/DL (ref 11.7–15.7)
HGB BLD-MCNC: 7.9 G/DL (ref 11.7–15.7)
MCH RBC QN AUTO: 31.9 PG (ref 26.5–33)
MCHC RBC AUTO-ENTMCNC: 34.3 G/DL (ref 31.5–36.5)
MCV RBC AUTO: 93 FL (ref 78–100)
PLATELET # BLD AUTO: 188 10E9/L (ref 150–450)
POTASSIUM SERPL-SCNC: 3.8 MMOL/L (ref 3.4–5.3)
RBC # BLD AUTO: 2.32 10E12/L (ref 3.8–5.2)
SODIUM SERPL-SCNC: 141 MMOL/L (ref 133–144)
WBC # BLD AUTO: 6.1 10E9/L (ref 4–11)

## 2017-03-22 PROCEDURE — A9270 NON-COVERED ITEM OR SERVICE: HCPCS | Mod: GY | Performed by: INTERNAL MEDICINE

## 2017-03-22 PROCEDURE — 25000132 ZZH RX MED GY IP 250 OP 250 PS 637: Mod: GY | Performed by: INTERNAL MEDICINE

## 2017-03-22 PROCEDURE — 85027 COMPLETE CBC AUTOMATED: CPT | Performed by: INTERNAL MEDICINE

## 2017-03-22 PROCEDURE — 85018 HEMOGLOBIN: CPT | Performed by: INTERNAL MEDICINE

## 2017-03-22 PROCEDURE — 36415 COLL VENOUS BLD VENIPUNCTURE: CPT | Performed by: INTERNAL MEDICINE

## 2017-03-22 PROCEDURE — 40000193 ZZH STATISTIC PT WARD VISIT

## 2017-03-22 PROCEDURE — 80048 BASIC METABOLIC PNL TOTAL CA: CPT | Performed by: INTERNAL MEDICINE

## 2017-03-22 PROCEDURE — 25000132 ZZH RX MED GY IP 250 OP 250 PS 637: Mod: GY | Performed by: PHYSICIAN ASSISTANT

## 2017-03-22 PROCEDURE — 25000125 ZZHC RX 250: Performed by: INTERNAL MEDICINE

## 2017-03-22 PROCEDURE — 99207 ZZC CDG-MDM COMPONENT: MEETS HIGH - UP CODED: CPT | Performed by: INTERNAL MEDICINE

## 2017-03-22 PROCEDURE — A9270 NON-COVERED ITEM OR SERVICE: HCPCS | Mod: GY | Performed by: PHYSICIAN ASSISTANT

## 2017-03-22 PROCEDURE — 97530 THERAPEUTIC ACTIVITIES: CPT | Mod: GP

## 2017-03-22 PROCEDURE — 25000128 H RX IP 250 OP 636: Performed by: INTERNAL MEDICINE

## 2017-03-22 PROCEDURE — 97116 GAIT TRAINING THERAPY: CPT | Mod: GP

## 2017-03-22 PROCEDURE — 99233 SBSQ HOSP IP/OBS HIGH 50: CPT | Performed by: INTERNAL MEDICINE

## 2017-03-22 PROCEDURE — 25000125 ZZHC RX 250: Performed by: PHYSICIAN ASSISTANT

## 2017-03-22 PROCEDURE — 12000007 ZZH R&B INTERMEDIATE

## 2017-03-22 PROCEDURE — 97161 PT EVAL LOW COMPLEX 20 MIN: CPT | Mod: GP

## 2017-03-22 RX ORDER — AMLODIPINE BESYLATE 10 MG/1
10 TABLET ORAL DAILY
Status: DISCONTINUED | OUTPATIENT
Start: 2017-03-22 | End: 2017-03-24 | Stop reason: HOSPADM

## 2017-03-22 RX ORDER — IRBESARTAN 150 MG/1
150 TABLET ORAL
Status: DISCONTINUED | OUTPATIENT
Start: 2017-03-22 | End: 2017-03-24 | Stop reason: HOSPADM

## 2017-03-22 RX ORDER — FEXOFENADINE HCL 180 MG/1
180 TABLET ORAL DAILY
Status: DISCONTINUED | OUTPATIENT
Start: 2017-03-22 | End: 2017-03-24 | Stop reason: HOSPADM

## 2017-03-22 RX ORDER — LEVOTHYROXINE SODIUM 50 UG/1
50 TABLET ORAL DAILY
Status: DISCONTINUED | OUTPATIENT
Start: 2017-03-22 | End: 2017-03-24 | Stop reason: HOSPADM

## 2017-03-22 RX ORDER — SUCRALFATE ORAL 1 G/10ML
1 SUSPENSION ORAL AT BEDTIME
Status: DISCONTINUED | OUTPATIENT
Start: 2017-03-22 | End: 2017-03-24 | Stop reason: HOSPADM

## 2017-03-22 RX ORDER — LACTOBACILLUS RHAMNOSUS GG 10B CELL
1 CAPSULE ORAL 2 TIMES DAILY
Status: DISCONTINUED | OUTPATIENT
Start: 2017-03-22 | End: 2017-03-24 | Stop reason: HOSPADM

## 2017-03-22 RX ADMIN — BRINZOLAMIDE 1 DROP: 10 SUSPENSION/ DROPS OPHTHALMIC at 20:52

## 2017-03-22 RX ADMIN — DOCUSATE SODIUM 100 MG: 100 CAPSULE, LIQUID FILLED ORAL at 08:43

## 2017-03-22 RX ADMIN — SODIUM CHLORIDE: 9 INJECTION, SOLUTION INTRAVENOUS at 08:21

## 2017-03-22 RX ADMIN — Medication 1 CAPSULE: at 20:45

## 2017-03-22 RX ADMIN — SODIUM CHLORIDE: 9 INJECTION, SOLUTION INTRAVENOUS at 20:48

## 2017-03-22 RX ADMIN — BRIMONIDINE TARTRATE AND TIMOLOL MALEATE 1 DROP: 2; 5 SOLUTION OPHTHALMIC at 21:01

## 2017-03-22 RX ADMIN — LEVOTHYROXINE SODIUM 50 MCG: 50 TABLET ORAL at 17:01

## 2017-03-22 RX ADMIN — FEXOFENADINE HYDROCHLORIDE 180 MG: 180 TABLET, FILM COATED ORAL at 20:45

## 2017-03-22 RX ADMIN — METOPROLOL TARTRATE 75 MG: 50 TABLET, FILM COATED ORAL at 20:44

## 2017-03-22 RX ADMIN — BRIMONIDINE TARTRATE AND TIMOLOL MALEATE 1 DROP: 2; 5 SOLUTION OPHTHALMIC at 08:14

## 2017-03-22 RX ADMIN — AMLODIPINE BESYLATE 10 MG: 10 TABLET ORAL at 17:01

## 2017-03-22 RX ADMIN — PANTOPRAZOLE SODIUM 40 MG: 40 INJECTION, POWDER, FOR SOLUTION INTRAVENOUS at 20:48

## 2017-03-22 RX ADMIN — BRINZOLAMIDE 1 DROP: 10 SUSPENSION/ DROPS OPHTHALMIC at 08:36

## 2017-03-22 RX ADMIN — SUCRALFATE 1 G: 1 SUSPENSION ORAL at 21:28

## 2017-03-22 RX ADMIN — HYDRALAZINE HYDROCHLORIDE 10 MG: 20 INJECTION INTRAMUSCULAR; INTRAVENOUS at 00:29

## 2017-03-22 RX ADMIN — LABETALOL HYDROCHLORIDE 10 MG: 5 INJECTION, SOLUTION INTRAVENOUS at 13:00

## 2017-03-22 RX ADMIN — HYDRALAZINE HYDROCHLORIDE 10 MG: 20 INJECTION INTRAMUSCULAR; INTRAVENOUS at 09:04

## 2017-03-22 RX ADMIN — TRAVOPROST OPHTHALMIC SOLUTION 1 DROP: 0.04 SOLUTION OPHTHALMIC at 21:28

## 2017-03-22 RX ADMIN — PANTOPRAZOLE SODIUM 40 MG: 40 INJECTION, POWDER, FOR SOLUTION INTRAVENOUS at 08:45

## 2017-03-22 NOTE — PROGRESS NOTES
Hospitalist service cross-cover note:     Called by RN regarding hemoglobin of 7.7 g/dl, was 7.6 g/dl on admission, received 1 unit pRBC and recheck was 9.2 g/dl. Hemodynamically stable and unchanged from admission. By time hemoglobin result was communicated, was due for recheck in 1 hour. Recheck at 7.9 g/dl. Given stability on repeat would continue to monitor without repeat transfusion (next check due at 0600, unless evidence of active bleed or changes in hemodynamics.      Donny Boyd MD   Hospitalist  [phone]

## 2017-03-22 NOTE — PLAN OF CARE
"Problem: Goal Outcome Summary  Goal: Goal Outcome Summary  Initial evaluation completed and treatment initiated.  Pt presented to the ED via EMS and her daughter for evaluation of syncope and rectal bleeding.  Pt with hx of dementia, please see chart for additional PMH.  Pt lives with her daughter who provides 24 hour assist, stating she only leaves pt alone for short periods of time while pt is napping.  They live in a condominium without stairs and pt ambulates Lydia with FWW, has assist from her daughter with bathing (mainly with getting in and out of the tub), is Lydia with toileting, and IND with dressing.  At home, pt has a tub shower, portable shower chair, and grab bars near the tub and toilet.  At time of assessment, pt AO x 3 (able to state she is in the hospital but unable to name the hospital).  Pt was able to complete bed mobility with SBA, functional transfers with CGA and FWW, and ambulated 140' x 2 with CGA and FWW.  Pt is limited by generalized weakness, deconditioning, and baseline cognitive and visual impairments.  Pt appears to be at or near baseline and discussed pt returning home with continued 24 hour assist provided by her daughter and home PT but pt's daughter prefers for pt to discharge to TCU and doesn't want people in her home.  She stated that pt views writer as a person of authority so will \"perform\" well.  However, pt's daughter feels that when pt returns home she will not perform well and will expect her daughter to do everything for her.  She also expressed concern that her mother will fall at night and she is unable to be awake all night to watch over her.  Pt's daughter feels that TCU would be the best option because pt will be able to get stronger and \"prove to herself\" that she is strong and can do more for herself at home.  Pt's daughter feels that a TCU stay will allow her to feel more comfortable having pt at home with her and she will be less concerned about falls, however " acknowledges that pt has baseline impairments that place her at increased risk for falls.  Pt's daughter does not feel comfortable taking pt home at this time and states if she does not go to a TCU, she will need to discharge to a nursing home.  Recommend discharge to TCU vs Home with 24 hour assist and home PT.

## 2017-03-22 NOTE — PLAN OF CARE
Problem: Goal Outcome Summary  Goal: Goal Outcome Summary  Outcome: No Change  Reason for Admission: GI bleed [K92.2]  Pertinent Medical History: dementia, renal failure     Admission: 3/21/2017  8:12 AM  Procedure/POD: n/a  Isolation/Precautions: No active isolations  Neuro/Psychological: alert to self, calm, cooperative. Right eye blindness, [place]  CV: hx HTN  Tele: N/a  VS: hypertensive, prn hydralazine given  Resp: diminished  Skin/Wound: ecchymotic, skin intact  GI/Diet: one large formed stool, maroon and blood tinged. NPO  : good UOP  IV: R PIV and L PIV, both SL  Pain: denies  Chemo/Radiation: n/a  Abnormal Labs/Glucose: hgb 7.6, 9.2, 7.7 and 7.9. Had recvd 1 unit PRBC days yesterday. Lactic high 2.7 on admission, recheck 1.0  Protocols: n/a  Activity/Safety: dtr states assist 1-2 with walker, was up w/A1 to BR; PT consulted  Consults: PT, GI  Education: POC, goals     Plans for DC: monitor bleeding- conservative treatment

## 2017-03-22 NOTE — PROGRESS NOTES
"[facility]  Hospitalist Progress Note  Alex Moses MD  03/22/2017    Assessment & Plan   ASSESSMENT: This is a 89-year-old female with end-stage renal disease, osteopenia, hypertension, hypothyroidism and dementia who is presenting today after being found with altered mental status and melena and hemoglobin is several points lower than baseline suggesting acute blood loss. Also, she has acute on chronic renal insufficiency and lactic acidosis suggestive of acute blood loss versus hypovolemia should be admitted for blood transfusion and GI consult and stabilization.       PLAN:   1. Melena secondary to suspected upper GI bleed.   - discontinue asa with risk > benefit in this demented patient.  - hgb down from 9.2 post transfusion to 7.7, 7.9, 7.4.   - continue serial hgb, q 12 hrs  - conservative approach without EGD/intervention if possible.  - continue IV PPI.  2. History of hypertension.   - resume her metoprolol and amlodipine, hold irbesartan for now.  3. History of hypothyroidism.   - resume 50 mcg a day   4. GERD.   - continue on po Protonix bid for the upper GI bleed and this should help cover GERD.   5. DVT prophylaxis; withholding anticoagulation due to GI bleed would only use PCDs while in bed.   6. Code status: Daughter confirms DNR/DNI.       DISPOSITION:  - to TCU on 3/24 once hgb stabilizes    Interval History   - chart reviewed  - discussed with nursing  - daughter at bedside  - patient very weak and shaky      -Data reviewed today: I reviewed all new labs and imaging over the last 24 hours. I personally reviewed no images or EKG's today.    Physical Exam   Heart Rate: 84, Blood pressure 163/80, pulse 83, temperature 97.7  F (36.5  C), temperature source Oral, resp. rate 16, height 1.6 m (5' 3\"), weight 56.3 kg (124 lb 3.2 oz), SpO2 99 %, not currently breastfeeding.  Vitals:    03/21/17 1146   Weight: 56.3 kg (124 lb 3.2 oz)     Vital Signs with Ranges  Temp:  [96  F (35.6 "  C)-98.7  F (37.1  C)] 97.7  F (36.5  C)  Pulse:  [83] 83  Heart Rate:  [83-91] 84  Resp:  [16] 16  BP: (146-170)/(56-84) 163/80  SpO2:  [96 %-99 %] 99 %  I/O's Last 24 hours  I/O last 3 completed shifts:  In: 0   Out: 400 [Urine:400]    Constitutional: Awake, alert, cooperative, no apparent distress  Respiratory: Clear to auscultation bilaterally, no crackles or wheezing  Cardiovascular: Regular rate and rhythm, normal S1 and S2, and no murmur noted  GI: Normal bowel sounds, soft, non-distended, non-tender  Skin/Integumen: No rashes, no cyanosis, no edema  Other:      Medications   All medications were reviewed.    NaCl 100 mL/hr at 03/22/17 0821       pantoprazole (PROTONIX) IV PEDS/NICU  40 mg Intravenous BID     sucralfate  1 g Oral At Bedtime     sodium chloride (PF)  3 mL Intracatheter Q8H     brinzolamide  1 drop Left Eye BID     brimonidine-timolol  1 drop Left Eye BID     docusate sodium  100 mg Oral BID     travoprost (MARY Free)  1 drop Left Eye At Bedtime        Data     Recent Labs  Lab 03/22/17  0746 03/22/17  0205 03/21/17  2210  03/21/17  0819   WBC 6.1  --   --   --  8.0   HGB 7.4* 7.9* 7.7*  < > 7.6*   MCV 93  --   --   --  93     --   --   --  260   INR  --   --   --   --  1.05     --   --   --  136   POTASSIUM 3.8  --   --   --  4.0   CHLORIDE 112*  --   --   --  104   CO2 21  --   --   --  21   BUN 56*  --   --   --  56*   CR 1.56*  --   --   --  1.80*   ANIONGAP 8  --   --   --  11   SAMSON 8.0*  --   --   --  7.8*   GLC 93  --   --   --  156*   ALBUMIN  --   --   --   --  2.6*   PROTTOTAL  --   --   --   --  5.3*   BILITOTAL  --   --   --   --  0.6   ALKPHOS  --   --   --   --  58   ALT  --   --   --   --  11   AST  --   --   --   --  12   TROPI  --   --   --   --  <0.015The 99th percentile for upper reference range is 0.045 ug/L.  Troponin values in the range of 0.045 - 0.120 ug/L may be associated with risks of adverse clinical events.   < > = values in this interval not  displayed.    No results found for this or any previous visit (from the past 24 hour(s)).    Alex Moses MD  Pager [phone]

## 2017-03-22 NOTE — PROVIDER NOTIFICATION
Paged on call hospitalist re: pt's hgb results. Spoke to Dr. Boyd, advised to continue to monitor for s/sx of bleeding, no transfusion at this time.

## 2017-03-22 NOTE — PLAN OF CARE
Problem: Goal Outcome Summary  Goal: Goal Outcome Summary  Outcome: No Change  Alert to self only. Finished 1u PRBC at the beginning of shift. Hypertensive. Notified MD, PRN labetalol and hydralazine ordered, labetalol given x1. BP improved after dose. [place], blind in right eye. Incontinent of urine and stool x2. Hgb recheck 9.2. Will continue to monitor.

## 2017-03-22 NOTE — PROGRESS NOTES
"[facility]  [facility]  Gastroenterology Progress note    Assessment:    1.  GIB, unclear source, consider upper  Plan:    -Continue to monitor Hgb  -Increase PPI to BID  -Add Carafate at night  -OK to start liquids  -Hold on EGD as risk greater than benefit.  Daughter agrees.          Interval History:      Patient without bleeding.  Daughter present.        Vital Signs:      /73 (BP Location: Left arm)  Pulse 83  Temp 98.5  F (36.9  C) (Oral)  Resp 16  Ht 1.6 m (5' 3\")  Wt 56.3 kg (124 lb 3.2 oz)  SpO2 96%  BMI 22 kg/m2  Temp (24hrs), Av.8  F (36.6  C), Min:96.3  F (35.7  C), Max:98.7  F (37.1  C)    Patient Vitals for the past 72 hrs:   Weight   17 1146 56.3 kg (124 lb 3.2 oz)       Intake/Output Summary (Last 24 hours) at 17 0936  Last data filed at 17 0800   Gross per 24 hour   Intake                0 ml   Output              400 ml   Net             -400 ml         Constitutional: NAD, comfortable    Additional Comments:  ROS, FH, SH: See initial GI consult for details.    Laboratory Data:  Recent Labs   Lab Test  17   0746  17   0205  17   2210   17   0819  16   1815   WBC  6.1   --    --    --   8.0  8.9   HGB  7.4*  7.9*  7.7*   < >  7.6*  10.8*   MCV  93   --    --    --   93  94   PLT  188   --    --    --   260  301   INR   --    --    --    --   1.05   --     < > = values in this interval not displayed.     Recent Labs   Lab Test  17   0746  17   0819  16   1942   NA  141  136  128*   POTASSIUM  3.8  4.0  4.2   CHLORIDE  112*  104  94   CO2    25   BUN  56*  56*  27   CR  1.56*  1.80*  1.51*   ANIONGAP  8  11  9   SAMSON  8.0*  7.8*  8.6     Recent Labs   Lab Test  17   0855  17   0819  10/06/16   1201  16   1815   09   0840   ALBUMIN   --   2.6*  4.3  3.6   < >   --    BILITOTAL   --   0.6  0.8  0.6   < >   --    ALT   --   11  8  12   < >   --    AST   " --   12  13  15   < >   --    ALKPHOS   --   58  84  83   < >   --    PROTEIN  30*   --    --    --    --   Negative    < > = values in this interval not displayed.             Ingrid Portillo, [facility]  Office:  [phone] call if needed after 5PM  Cell:  [phone], not available after 5PM at this number

## 2017-03-22 NOTE — PROGRESS NOTES
03/22/17 1400   Quick Adds   Type of Visit Initial PT Evaluation   Living Environment   Lives With child(agustin), adult   Living Arrangements condominium   Home Accessibility grab bars present (bathtub);grab bars present (toilet)   Number of Stairs to Enter Home 0   Number of Stairs Within Home 0   Transportation Available family or friend will provide   Living Environment Comment PT: pt lives with her daughter who provides 24 hour assist, stating she only leaves pt alone for short periods of time while pt is napping.    Self-Care   Usual Activity Tolerance fair   Current Activity Tolerance fair   Regular Exercise yes   Activity/Exercise Type strength training   Exercise Amount/Frequency daily   Equipment Currently Used at Home grab bar;shower chair;walker, rolling   Activity/Exercise/Self-Care Comment PT: pt does upper and lower body strengthening exercises daily, however, pt's daughter reports pt has not been doing the exercises for the last week or so which has resulted in increased weakness.    Functional Level Prior   Ambulation 1-->assistive equipment   Transferring 1-->assistive equipment   Toileting 1-->assistive equipment   Bathing 3-->assistive equipment and person   Dressing 0-->independent   Eating 0-->independent   Communication 0-->understands/communicates without difficulty   Swallowing 0-->swallows foods/liquids without difficulty   Cognition 1 - attention or memory deficits   Fall history within last six months yes   Number of times patient has fallen within last six months 3   Which of the above functional risks had a recent onset or change? none   Prior Functional Level Comment PT: pt's daughter reports pt has had 3 falls in the last year and a half.    General Information   Onset of Illness/Injury or Date of Surgery - Date 03/21/17   Referring Physician Pb Woody MD   Patient/Family Goals Statement pt's daughter would like for pt to go to TCU   Pertinent History of Current Problem  (include personal factors and/or comorbidities that impact the POC) Milagros Marie is a 89 year old female with a history of dementia, homocystinemia and renal failure who presents to the emergency department today via EMS and her daughter for evaluation of syncope and rectal bleeding this morning. EMS states that the patient felt fine yesterday, and was ambulating with her walker and acting like herself per the patient's daughter. EMS states that the patient got up this morning at the private residence that she resides at with her daughter, and was found on the ground between the toilet and bathtub by her daughter, prompting 911 to be called.   Precautions/Limitations fall precautions   General Observations PT: pt supine in bed upon arrival with daughter present and agreeable to PT session.    General Info Comments Activity: Up with assist   Cognitive Status Examination   Orientation orientation to person, place and time  (able to state she is in the hospital,unable to name hospital)   Level of Consciousness alert   Follows Commands and Answers Questions 100% of the time   Pain Assessment   Patient Currently in Pain No   Strength   Strength Comments PT: BLE strength at least 3/5.   Bed Mobility   Bed Mobility Comments PT: SBA   Transfer Skills   Transfer Comments PT: CGA and FWW   Gait   Gait Comments PT: CGA and FWW   Balance   Balance no deficits were identified   General Therapy Interventions   Planned Therapy Interventions bed mobility training;gait training;strengthening;transfer training;home program guidelines;progressive activity/exercise   Clinical Impression   Criteria for Skilled Therapeutic Intervention yes, treatment indicated   PT Diagnosis generalized weakness and deconditioning.   Influenced by the following impairments generalized weakness, deconditioning, hx of dementia, visual impairments.    Functional limitations due to impairments impaired functional activity tolerance and impaired  "functional mobility.    Clinical Presentation Stable/Uncomplicated   Clinical Presentation Rationale 1 system assessed - musculoskeletal   Clinical Decision Making (Complexity) Low complexity   Therapy Frequency` daily   Predicted Duration of Therapy Intervention (days/wks) 7 days   Anticipated Equipment Needs at Discharge other (see comments)  (none anticipated )   Anticipated Discharge Disposition Home with Assist;Transitional Care Facility   Risk & Benefits of therapy have been explained Yes   Patient, Family & other staff in agreement with plan of care Yes   [facility] TM \"6 Clicks\"   2016, Trustees of [facility], under license to [employer].  All rights reserved.   6 Clicks Short Forms Basic Mobility Inpatient Short Form   [facility] AM-PAC  \"6 Clicks\" V.2 Basic Mobility Inpatient Short Form   1. Turning from your back to your side while in a flat bed without using bedrails? 3 - A Little   2. Moving from lying on your back to sitting on the side of a flat bed without using bedrails? 3 - A Little   3. Moving to and from a bed to a chair (including a wheelchair)? 3 - A Little   4. Standing up from a chair using your arms (e.g., wheelchair, or bedside chair)? 3 - A Little   5. To walk in hospital room? 3 - A Little   6. Climbing 3-5 steps with a railing? 3 - A Little   Basic Mobility Raw Score (Score out of 24.Lower scores equate to lower levels of function) 18   Total Evaluation Time   Total Evaluation Time (Minutes) 10     "

## 2017-03-22 NOTE — PLAN OF CARE
Problem: Goal Outcome Summary  Goal: Goal Outcome Summary  Outcome: No Change  Station 88 7a-12noon shift 3-22-17 report -by Rosalind SLAUGHTER RN.     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 Right eye blindness, [place]  CV: hx HTN  Tele: N/a  VS: hypertensive, prn hydralazine given x1 nfor /73  Resp: diminished  Skin/Wound: ecchymotic, skin intact  GI/Diet: had maroon formed BM yesterday. Was  NPO at shift start. Diet advanced toCL By CODI'mohamud PUGH  : Inc large amt urine x1  IV: R PIV and L PIV, both SL  Pain: denies     Abnormal Labs/Glucose: Remains anemic Hgb 7.4. Writer will text Page hospitalist dr Moses about Hgb and transfer to station 55  Had recvd 1 unit PRBC days yesterday. Lactic high 2.7 on admission, recheck 1.0  Protocols: n/a  Activity/Safety:  Fall risk, Unsteady generalized weakness. Was up in chair w assist of 2 and gaitbelt this am x1 PT consulted  Consults: PT, GI  Education: POC, transfer to 55     Plans for DC: Transferred at 12noon  to station 55 rm [id number] per charge nurse need of current room. Writer gave verbal report to receiving 55 nurse Lorelei TERAN   Pt lives with daughter . Anticipate dc to TCU when medically stable

## 2017-03-22 NOTE — PLAN OF CARE
Problem: Goal Outcome Summary  Goal: Goal Outcome Summary  Outcome: Improving  Pt able to take fluids without nausea. B/S active.Has not had any rectal bleeding since trasferred from Sta [facility] at 1200. Got OOB with a walker and 1 assist and tolerated well. Wears attends and is incontinent of urine.  B/P has been elevated, given Labetalol IV x1.

## 2017-03-23 ENCOUNTER — APPOINTMENT (OUTPATIENT)
Dept: PHYSICAL THERAPY | Facility: CLINIC | Age: 82
DRG: 377 | End: 2017-03-23
Payer: MEDICARE

## 2017-03-23 LAB — HGB BLD-MCNC: 7.3 G/DL (ref 11.7–15.7)

## 2017-03-23 PROCEDURE — 25000132 ZZH RX MED GY IP 250 OP 250 PS 637: Mod: GY | Performed by: INTERNAL MEDICINE

## 2017-03-23 PROCEDURE — A9270 NON-COVERED ITEM OR SERVICE: HCPCS | Mod: GY | Performed by: PHYSICIAN ASSISTANT

## 2017-03-23 PROCEDURE — 97110 THERAPEUTIC EXERCISES: CPT | Mod: GP | Performed by: PHYSICAL THERAPIST

## 2017-03-23 PROCEDURE — 25000128 H RX IP 250 OP 636: Performed by: INTERNAL MEDICINE

## 2017-03-23 PROCEDURE — 12000007 ZZH R&B INTERMEDIATE

## 2017-03-23 PROCEDURE — 85018 HEMOGLOBIN: CPT | Performed by: INTERNAL MEDICINE

## 2017-03-23 PROCEDURE — 25000125 ZZHC RX 250: Performed by: PHYSICIAN ASSISTANT

## 2017-03-23 PROCEDURE — A9270 NON-COVERED ITEM OR SERVICE: HCPCS | Mod: GY | Performed by: INTERNAL MEDICINE

## 2017-03-23 PROCEDURE — 40000193 ZZH STATISTIC PT WARD VISIT: Performed by: PHYSICAL THERAPIST

## 2017-03-23 PROCEDURE — 40000141 ZZH STATISTIC PERIPHERAL IV START W/O US GUIDANCE

## 2017-03-23 PROCEDURE — 99232 SBSQ HOSP IP/OBS MODERATE 35: CPT | Performed by: INTERNAL MEDICINE

## 2017-03-23 PROCEDURE — 25000132 ZZH RX MED GY IP 250 OP 250 PS 637: Mod: GY | Performed by: PHYSICIAN ASSISTANT

## 2017-03-23 PROCEDURE — 97116 GAIT TRAINING THERAPY: CPT | Mod: GP | Performed by: PHYSICAL THERAPIST

## 2017-03-23 PROCEDURE — 36415 COLL VENOUS BLD VENIPUNCTURE: CPT | Performed by: INTERNAL MEDICINE

## 2017-03-23 RX ORDER — PANTOPRAZOLE SODIUM 40 MG/1
40 TABLET, DELAYED RELEASE ORAL
Status: DISCONTINUED | OUTPATIENT
Start: 2017-03-23 | End: 2017-03-24 | Stop reason: HOSPADM

## 2017-03-23 RX ADMIN — BRIMONIDINE TARTRATE AND TIMOLOL MALEATE 1 DROP: 2; 5 SOLUTION OPHTHALMIC at 19:24

## 2017-03-23 RX ADMIN — SODIUM CHLORIDE: 9 INJECTION, SOLUTION INTRAVENOUS at 06:35

## 2017-03-23 RX ADMIN — METOPROLOL TARTRATE 75 MG: 50 TABLET, FILM COATED ORAL at 09:17

## 2017-03-23 RX ADMIN — Medication 1 CAPSULE: at 09:17

## 2017-03-23 RX ADMIN — IRBESARTAN 150 MG: 150 TABLET ORAL at 16:12

## 2017-03-23 RX ADMIN — TRAVOPROST OPHTHALMIC SOLUTION 1 DROP: 0.04 SOLUTION OPHTHALMIC at 19:58

## 2017-03-23 RX ADMIN — METOPROLOL TARTRATE 75 MG: 50 TABLET, FILM COATED ORAL at 19:09

## 2017-03-23 RX ADMIN — SUCRALFATE 1 G: 1 SUSPENSION ORAL at 19:59

## 2017-03-23 RX ADMIN — BRINZOLAMIDE 1 DROP: 10 SUSPENSION/ DROPS OPHTHALMIC at 09:17

## 2017-03-23 RX ADMIN — Medication 1 CAPSULE: at 19:58

## 2017-03-23 RX ADMIN — FEXOFENADINE HYDROCHLORIDE 180 MG: 180 TABLET, FILM COATED ORAL at 19:58

## 2017-03-23 RX ADMIN — PANTOPRAZOLE SODIUM 40 MG: 40 INJECTION, POWDER, FOR SOLUTION INTRAVENOUS at 09:17

## 2017-03-23 RX ADMIN — BRIMONIDINE TARTRATE AND TIMOLOL MALEATE 1 DROP: 2; 5 SOLUTION OPHTHALMIC at 09:17

## 2017-03-23 RX ADMIN — PANTOPRAZOLE SODIUM 40 MG: 40 TABLET, DELAYED RELEASE ORAL at 19:09

## 2017-03-23 RX ADMIN — IRBESARTAN 150 MG: 150 TABLET ORAL at 09:17

## 2017-03-23 RX ADMIN — LEVOTHYROXINE SODIUM 50 MCG: 50 TABLET ORAL at 06:35

## 2017-03-23 RX ADMIN — BRINZOLAMIDE 1 DROP: 10 SUSPENSION/ DROPS OPHTHALMIC at 19:35

## 2017-03-23 RX ADMIN — DOCUSATE SODIUM 100 MG: 100 CAPSULE, LIQUID FILLED ORAL at 19:58

## 2017-03-23 RX ADMIN — AMLODIPINE BESYLATE 10 MG: 10 TABLET ORAL at 09:17

## 2017-03-23 NOTE — PLAN OF CARE
Problem: Goal Outcome Summary  Goal: Goal Outcome Summary  Outcome: Improving  Alert to self. Disoriented to place, time, & situation intermittently. Up with assist x 1 & walker. Voiding BR & incontinent at times. No stools during shift. No rectal bleeding. Denies pain. Denies nausea. Impulsive at times getting out of bed. IVF infusing. BP's within normal range after receiving patient's scheduled metoprolol.

## 2017-03-23 NOTE — PROGRESS NOTES
GI    Attempted to see patient.  She was sleeping.  Daughter not present.      No further bleeding.      I would continue the PPI BID x 8 weeks then daily indefinitely.  Carafate liquid at night for 14 days.     OK to d/c from a GI standpoint.  Please call if questions.    EVELIN Mcdaniels  [facility]  Office:  [phone] call if needed after 5PM  Cell:  [phone], not available after 5PM at this number

## 2017-03-23 NOTE — PLAN OF CARE
Problem: Goal Outcome Summary  Goal: Goal Outcome Summary  PT pt able to move supine to sit with min A for bedding, sit to stand with SBA, amb with min A for navigation and AD management 300', daniel standing ex with UE support.  Plan for TCU to maximize functional mob I and safety

## 2017-03-23 NOTE — PROGRESS NOTES
Assessed both arms with US for venous access.  Veins are small and sclerosed.  22AC placed in SULLY, but will be fragile.. Discussed with pt.'s nurse.

## 2017-03-23 NOTE — PLAN OF CARE
Problem: Goal Outcome Summary  Goal: Goal Outcome Summary  Outcome: Improving  Pt doing well today, pt up with SBA of 1 and walker.  Pt is [place] and blind in the rt eye (per daughter), pt did sit up twice today.  Pt is progressing towards her goals.

## 2017-03-23 NOTE — PROGRESS NOTES
Care Transition Initial Assessment - SW  Reason For Consult: discharge planning  Met with: Patients Chantell cabrera.  Active Problems:    Upper GI bleed         DATA  Lives With: child(agustin), adult  Living Arrangements: condominium  Description of Support System: Supportive, Involved  Who is your support system?: Children  Support Assessment: Adequate family and caregiver support, Adequate social supports.   Identified issues/concerns regarding health management:   Patient feels that they have adequate support @ home?  Yes           Quality Of Family Relationships: supportive, involved  Transportation Available: TBD      ASSESSMENT  Cognitive Status:  Awake, alert and oriented with some confusion.  Concerns to be addressed:     Per automatic referral, SW met with patients daughter/Chantell BREWER, to discuss discharge planning needs.  Patient is a 89-year-old female who was admitted to the hospital on 3-21-17 with a fall and GI Bleed.  Prior to hospitalization, patient was living with her daughter in a condo where the daughter was providing primary care to the patient.  Patients daughter reports that she does not feel that she is able to care for the patient at home any longer and is interested in looking into LTC options.  Patients daughter reports that her mom goes to [facility] 1 day per week and that this would be a convenient location for LTC.  If they don't have a bed available, patients daughter was also okay with [facility] as a back up option.  SW made a referral to the facilities via Discharge on the Double to have them assess patient for a possible admission for tomorrow.  SW will follow up regarding transportation once the discharge plan has been finalized.       PLAN  Financial costs for the patient includes: Discussed coverage for TCU and LTC.  Patient given options and choices for discharge: TCU facility list offered.  Patient/family is agreeable to the plan?  Yes  Patient  Goals and Preferences: TCU vs LTC at discharge.  Patient anticipates discharging to:  TCU vs LTC at discharge.    KLAUS Cervantes      CARLO  D: SW following for discharge planning needs.  CARLO received a message from Stephany in Admissions at [facility] stating that they will have a bed available for patient tomorrow on the Rehab Unit and will transition the patient to LTC once a bed becomes available.  I: SW updated patients daughter on the above and will arrange appropriate transportation tomorrow.  A: Patient is alert and oriented with some confusion noted.  P: SW will continue to follow and assist with finalizing the discharge plan as appropriate.    KLAUS Cervantes

## 2017-03-23 NOTE — PROGRESS NOTES
[facility]    Hospitalist Progress Note    Assessment & Plan   Milagros Marie is a 89 year old female who was admitted on 3/21/2017.       89-year-old female with end-stage renal disease, osteopenia, hypertension, hypothyroidism and dementia who is presenting today after being found with altered mental status and melena and hemoglobin is several points lower than baseline suggesting acute blood loss. Also, she has acute on chronic renal insufficiency and lactic acidosis suggestive of acute blood loss versus hypovolemia should be admitted for blood transfusion and GI consult and stabilization.       PLAN:   1. Melena secondary to suspected upper GI bleed.   - discontinue asa with risk > benefit in this demented patient.  - hgb down from 9.2 post transfusion to 7.7, 7.9, 7.4, 7.9, 7.3  - recheck hgb in am  - conservative approach  - change protonix to bid po  2. History of hypertension.   - resume her metoprolol and amlodipine, hold irbesartan for now.  3. History of hypothyroidism.   - resumed 50 mcg a day   4. GERD.   - continue on po Protonix bid for the upper GI bleed and this should help cover GERD.   5. DVT prophylaxis; withholding anticoagulation due to GI bleed would only use PCDs while in bed.   6. Code status: Daughter confirms DNR/DNI.       DISPOSITION:  - to TCU on 3/24 once hgb stabilizes           Lainey Fall MD    Interval History   No new problems, plan d/c to TCU tomorrow  -Data reviewed today: I reviewed all new labs and imaging results over the last 24 hours. I personally reviewed no images or EKG's today.    Physical Exam   Temp: 97.6  F (36.4  C) Temp src: Oral BP: 135/63   Heart Rate: 61 Resp: 16 SpO2: 97 % O2 Device: None (Room air)    Vitals:    03/21/17 1146   Weight: 56.3 kg (124 lb 3.2 oz)     Vital Signs with Ranges  Temp:  [97.6  F (36.4  C)-98.7  F (37.1  C)] 97.6  F (36.4  C)  Heart Rate:  [61-98] 61  Resp:  [16] 16  BP: (135-163)/(63-80) 135/63  SpO2:  [96  %-99 %] 97 %  I/O last 3 completed shifts:  In: 1300 [P.O.:200; I.V.:1100]  Out: -     Constitutional: alert   Respiratory: clear to auscultation, no wheezing or rhonchi   Cardiovascular: regular rate and rhythm without murmer or rub   GI:positive bowel sounds, non-tender   Skin/Integumen: no rashes    Other:        Medications     NaCl 100 mL/hr at 03/23/17 0635       pantoprazole (PROTONIX) IV PEDS/NICU  40 mg Intravenous BID     sucralfate  1 g Oral At Bedtime     sodium chloride (PF)  3 mL Intracatheter Q8H     amLODIPine (NORVASC) tablet 10 mg  10 mg Oral Daily     fexofenadine (ALLEGRA) tablet 180 mg  180 mg Oral Daily     irbesartan  150 mg Oral BID     metoprolol  75 mg Oral BID     lactobacillus rhamnosus (GG)  1 capsule Oral BID     levothyroxine  50 mcg Oral Daily     brinzolamide  1 drop Left Eye BID     brimonidine-timolol  1 drop Left Eye BID     docusate sodium  100 mg Oral BID     travoprost (MARY Free)  1 drop Left Eye At Bedtime       Data     Recent Labs  Lab 03/23/17  0558 03/22/17 2000 03/22/17  0746  03/21/17  0819   WBC  --   --  6.1  --  8.0   HGB 7.3* 7.9* 7.4*  < > 7.6*   MCV  --   --  93  --  93   PLT  --   --  188  --  260   INR  --   --   --   --  1.05   NA  --   --  141  --  136   POTASSIUM  --   --  3.8  --  4.0   CHLORIDE  --   --  112*  --  104   CO2  --   --  21  --  21   BUN  --   --  56*  --  56*   CR  --   --  1.56*  --  1.80*   ANIONGAP  --   --  8  --  11   SAMSON  --   --  8.0*  --  7.8*   GLC  --   --  93  --  156*   ALBUMIN  --   --   --   --  2.6*   PROTTOTAL  --   --   --   --  5.3*   BILITOTAL  --   --   --   --  0.6   ALKPHOS  --   --   --   --  58   ALT  --   --   --   --  11   AST  --   --   --   --  12   TROPI  --   --   --   --  <0.015The 99th percentile for upper reference range is 0.045 ug/L.  Troponin values in the range of 0.045 - 0.120 ug/L may be associated with risks of adverse clinical events.   < > = values in this interval not displayed.    No results found  for this or any previous visit (from the past 24 hour(s)).

## 2017-03-24 ENCOUNTER — APPOINTMENT (OUTPATIENT)
Dept: PHYSICAL THERAPY | Facility: CLINIC | Age: 82
DRG: 377 | End: 2017-03-24
Payer: MEDICARE

## 2017-03-24 VITALS
RESPIRATION RATE: 18 BRPM | TEMPERATURE: 98.2 F | DIASTOLIC BLOOD PRESSURE: 80 MMHG | WEIGHT: 124.2 LBS | HEART RATE: 83 BPM | OXYGEN SATURATION: 98 % | SYSTOLIC BLOOD PRESSURE: 139 MMHG | HEIGHT: 63 IN | BODY MASS INDEX: 22.01 KG/M2

## 2017-03-24 LAB
ERYTHROCYTE [DISTWIDTH] IN BLOOD BY AUTOMATED COUNT: 15.1 % (ref 10–15)
HCT VFR BLD AUTO: 22.3 % (ref 35–47)
HGB BLD-MCNC: 7.7 G/DL (ref 11.7–15.7)
MCH RBC QN AUTO: 32.4 PG (ref 26.5–33)
MCHC RBC AUTO-ENTMCNC: 34.5 G/DL (ref 31.5–36.5)
MCV RBC AUTO: 94 FL (ref 78–100)
PLATELET # BLD AUTO: 246 10E9/L (ref 150–450)
RBC # BLD AUTO: 2.38 10E12/L (ref 3.8–5.2)
WBC # BLD AUTO: 4.6 10E9/L (ref 4–11)

## 2017-03-24 PROCEDURE — A9270 NON-COVERED ITEM OR SERVICE: HCPCS | Mod: GY | Performed by: INTERNAL MEDICINE

## 2017-03-24 PROCEDURE — 25000132 ZZH RX MED GY IP 250 OP 250 PS 637: Mod: GY | Performed by: INTERNAL MEDICINE

## 2017-03-24 PROCEDURE — 85027 COMPLETE CBC AUTOMATED: CPT | Performed by: INTERNAL MEDICINE

## 2017-03-24 PROCEDURE — 40000193 ZZH STATISTIC PT WARD VISIT

## 2017-03-24 PROCEDURE — 97116 GAIT TRAINING THERAPY: CPT | Mod: GP

## 2017-03-24 PROCEDURE — 99238 HOSP IP/OBS DSCHRG MGMT 30/<: CPT | Performed by: INTERNAL MEDICINE

## 2017-03-24 PROCEDURE — 97110 THERAPEUTIC EXERCISES: CPT | Mod: GP

## 2017-03-24 PROCEDURE — 36415 COLL VENOUS BLD VENIPUNCTURE: CPT | Performed by: INTERNAL MEDICINE

## 2017-03-24 RX ORDER — PANTOPRAZOLE SODIUM 40 MG/1
TABLET, DELAYED RELEASE ORAL
Qty: 60 TABLET | Refills: 2 | DISCHARGE
Start: 2017-03-24 | End: 2017-07-21 | Stop reason: DRUGHIGH

## 2017-03-24 RX ORDER — SUCRALFATE ORAL 1 G/10ML
1 SUSPENSION ORAL AT BEDTIME
Qty: 140 ML | DISCHARGE
Start: 2017-03-24 | End: 2017-04-07

## 2017-03-24 RX ORDER — FERROUS GLUCONATE 324(38)MG
324 TABLET ORAL 2 TIMES DAILY
Qty: 100 TABLET | Refills: 1 | DISCHARGE
Start: 2017-03-24 | End: 2017-08-08

## 2017-03-24 RX ADMIN — Medication 1 CAPSULE: at 08:29

## 2017-03-24 RX ADMIN — PANTOPRAZOLE SODIUM 40 MG: 40 TABLET, DELAYED RELEASE ORAL at 06:52

## 2017-03-24 RX ADMIN — DOCUSATE SODIUM 100 MG: 100 CAPSULE, LIQUID FILLED ORAL at 08:29

## 2017-03-24 RX ADMIN — LEVOTHYROXINE SODIUM 50 MCG: 50 TABLET ORAL at 06:52

## 2017-03-24 RX ADMIN — IRBESARTAN 150 MG: 150 TABLET ORAL at 08:29

## 2017-03-24 RX ADMIN — BRINZOLAMIDE 1 DROP: 10 SUSPENSION/ DROPS OPHTHALMIC at 08:30

## 2017-03-24 RX ADMIN — BRIMONIDINE TARTRATE AND TIMOLOL MALEATE 1 DROP: 2; 5 SOLUTION OPHTHALMIC at 08:30

## 2017-03-24 RX ADMIN — METOPROLOL TARTRATE 75 MG: 50 TABLET, FILM COATED ORAL at 08:29

## 2017-03-24 RX ADMIN — AMLODIPINE BESYLATE 10 MG: 10 TABLET ORAL at 08:30

## 2017-03-24 NOTE — PROGRESS NOTES
[facility]    Hospitalist Progress Note    Assessment & Plan   Milagros Marie is a 89 year old female who was admitted on 3/21/2017.     89-year-old female with end-stage renal disease, osteopenia, hypertension, hypothyroidism and dementia who is presenting today after being found with altered mental status and melena and hemoglobin is several points lower than baseline suggesting acute blood loss. Also, she has acute on chronic renal insufficiency and lactic acidosis suggestive of acute blood loss versus hypovolemia should be admitted for blood transfusion and GI consult and stabilization.       PLAN:   1. Melena secondary to suspected upper GI bleed.   - discontinue asa with risk > benefit in this demented patient.  - hgb down from 9.2 post transfusion, but now stable  -hgb 7.7<7.3<7.9<7.4<7.9<7.7  - conservative approach  - changed protonix to bid po 3/23  -hgb stable, will d/c to TCU today    2. History of hypertension.   - resume her metoprolol and amlodipine, restart  irbesartan today    3. History of hypothyroidism.   - resumed 50 mcg a day     4. GERD.   - continue on po Protonix bid for the upper GI bleed and this should help cover GERD.     Chronic renal insufficiency  -followed by Dr. Brush who will see the pt back in 6 months  -creatinine 1.526< 1.80  -  5. DVT prophylaxis; withholding anticoagulation due to GI bleed would only use PCDs while in bed.   6. Code status: Daughter confirms DNR/DNI.       DISPOSITION:  - to TCU today      Lainey Fall MD    Interval History   No new problems      -Data reviewed today: I reviewed all new labs and imaging results over the last 24 hours. I personally reviewed no images or EKG's today.    Physical Exam   Temp: 98.2  F (36.8  C) Temp src: Oral BP: 139/80   Heart Rate: 80 Resp: 18 SpO2: 98 % O2 Device: None (Room air)    Vitals:    03/21/17 1146   Weight: 56.3 kg (124 lb 3.2 oz)     Vital Signs with Ranges  Temp:  [97.1  F (36.2   C)-98.2  F (36.8  C)] 98.2  F (36.8  C)  Heart Rate:  [61-81] 80  Resp:  [16-18] 18  BP: (135-158)/(63-82) 139/80  SpO2:  [97 %-99 %] 98 %       Constitutional: alert   Respiratory: clear to auscultation, no wheezing or rhonchi   Cardiovascular: regular rate and rhythm without murmer or rub   GI:positive bowel sounds, non-tender   Skin/Integumen: no rashes    Other:        Medications     NaCl Stopped (03/23/17 1850)       pantoprazole  40 mg Oral BID AC     sucralfate  1 g Oral At Bedtime     sodium chloride (PF)  3 mL Intracatheter Q8H     amLODIPine (NORVASC) tablet 10 mg  10 mg Oral Daily     fexofenadine (ALLEGRA) tablet 180 mg  180 mg Oral Daily     irbesartan  150 mg Oral BID     metoprolol  75 mg Oral BID     lactobacillus rhamnosus (GG)  1 capsule Oral BID     levothyroxine  50 mcg Oral Daily     brinzolamide  1 drop Left Eye BID     brimonidine-timolol  1 drop Left Eye BID     docusate sodium  100 mg Oral BID     travoprost (MARY Free)  1 drop Left Eye At Bedtime       Data     Recent Labs  Lab 03/24/17  0624 03/23/17  0558 03/22/17 2000 03/22/17  0746  03/21/17  0819   WBC 4.6  --   --  6.1  --  8.0   HGB 7.7* 7.3* 7.9* 7.4*  < > 7.6*   MCV 94  --   --  93  --  93     --   --  188  --  260   INR  --   --   --   --   --  1.05   NA  --   --   --  141  --  136   POTASSIUM  --   --   --  3.8  --  4.0   CHLORIDE  --   --   --  112*  --  104   CO2  --   --   --  21  --  21   BUN  --   --   --  56*  --  56*   CR  --   --   --  1.56*  --  1.80*   ANIONGAP  --   --   --  8  --  11   SAMSON  --   --   --  8.0*  --  7.8*   GLC  --   --   --  93  --  156*   ALBUMIN  --   --   --   --   --  2.6*   PROTTOTAL  --   --   --   --   --  5.3*   BILITOTAL  --   --   --   --   --  0.6   ALKPHOS  --   --   --   --   --  58   ALT  --   --   --   --   --  11   AST  --   --   --   --   --  12   TROPI  --   --   --   --   --  <0.015The 99th percentile for upper reference range is 0.045 ug/L.  Troponin values in the range of  0.045 - 0.120 ug/L may be associated with risks of adverse clinical events.   < > = values in this interval not displayed.    No results found for this or any previous visit (from the past 24 hour(s)).

## 2017-03-24 NOTE — PLAN OF CARE
Problem: Goal Outcome Summary  Goal: Goal Outcome Summary  Outcome: Therapy, progress toward functional goals as expected  PT- Per plan established by the Physical Therapist, according to functional mobility the discharge recommendation is TCU. Sit to/from stand with FWW and close SBA with cues for safety. Amb 400 ft x 1 with FWW and close SBA. Pt tolerates well, c/o minor R knee pain that subsides as gait distance increases. Tolerates select standing LE exs with use of FWW for UE support with CGA. Pt remained sitting up in chair at end of session with all needs in reach and chair alarm on.    Pt discharging to TCU today.    PT goals partially met.

## 2017-03-24 NOTE — PROGRESS NOTES
CARLO  D: Per MD order, patient okay to discharge today to Rehab.  I: CARLO faxed the discharge orders and spoke to patients daughter regarding transportation to get patient to Rehab.  Patients daughter requested that SW arrange a w/c ride for discharge.  Patients daughter states that patient does have MA so CARLO explained that the cost of transportation [facility]  CARLO spoke to Silvino at [facility] and scheduled transportation for today at 1530.  CARLO faxed the facesheet to [facility].  CARLO spoke to Stephany in Admissions at [facility] to update her that patient is ready for discharge today and the time of transportation.  A: Patient is alert and oriented.  Patients daughter is aware and in agreement with the plan for patient to discharge today to Rehab.  P: Patient to discharge today to [facility].  CARLO will be available to assist as needed.    KLAUS Cervantes      PAS-RR    D: Per DHS regulation, CARLO completed and submitted PAS-RR to [place] [employer] Direct Connect via the Senior [employer] Line.  PAS-RR confirmation # is : [id number].    I: CARLO spoke with patients daughter, Chantell, and she is aware a PAS-RR has been submitted.  CARLO reviewed with Chantell that she may be contacted for a follow up appointment within 10 days of hospital discharge if their SNF stay is < 30 days.  Contact information for [facility] was also provided.    A: Chantell verbalized understanding.    P: Further questions may be directed to [facility] Line at #[phone], option #4 for PAS-RR staff.    KLAUS Cervantes

## 2017-03-24 NOTE — PLAN OF CARE
Problem: Goal Outcome Summary  Goal: Goal Outcome Summary  Outcome: Improving  SBP 150s otherwise VSS. O2 sat mid 90s on RA. Denies pain, discomfort, or SOB. IV protonix switched to PO this shift. Q6hr Hgb checks D/C'd, recheck in AM. Tolerating clear liquid diet, no BMs this shift. Alert, disoriented to place, up w/ 1 and walker. Plan for possible D/C to TCU tomorrow if hgb stable. Continue to monitor.

## 2017-03-24 NOTE — PLAN OF CARE
Problem: Goal Outcome Summary  Goal: Goal Outcome Summary  Outcome: Improving  Pt doing well today, up with SBA of 1 and walker, pt is [place] and blind in rt eye.  Pt's daughter with pt most of the day, pt will be going to rehab, discharge instructions given.  Pt met her goals.

## 2017-03-24 NOTE — DISCHARGE INSTRUCTIONS
You have been discharged to [facility]  Phone Number is [phone]  Fax Number is [phone]

## 2017-03-24 NOTE — DISCHARGE SUMMARY
DATE OF ADMISSION:  03/21/2017.       DATE OF DISCHARGE:  03/24/2017.       PRIMARY CARE PHYSICIAN:  Elo Coyne MD.       The patient is DNR/DNI.      DISCHARGE DIAGNOSES:   1.  Suspected upper gastrointestinal bleed with melena.   2.  Anemia, stable.   3.  History of hypertension.   4.  History of hypothyroidism.   5.  History of gastroesophageal reflux disease.     6.  Chronic renal insufficiency.      DISCHARGE MEDICATIONS:   NEW MEDICATIONS:     1.  Protonix 40 mg orally twice a day for 8 weeks, then daily indefinitely.   2.  Sucralfate 1 gram orally at bedtime for 14 days.   3.  Ferrous gluconate 324 mg b.i.d.        MEDICATIONS THAT HAVE NOT CHANGED:     1.  Acetaminophen Extra Strength 500 mg p.o. t.i.d.    2.  Probiotic capsules, 1 capsule daily at lunch.     3.  Allegra 180 mg p.o. daily.   4.  Amlodipine 10 mg daily.   5.  Azopt 1% ophthalmic suspension 1 drop left eye b.i.d.   6.  Calcium with vitamin D 1 tablet daily.   7.  Keflex 2000 mg p.o. 1 hour prior to dental procedure.    8.  Combigan 0.2-0.5 ophthalmic solution 1 drop left eye b.i.d.   9.  Avapro 300 mg tablets, take half a tablet or 150 mg b.i.d.   10.  Levothyroxine 50 mcg p.o. daily.   11.  Magnesium 500 mg p.o. daily.   12.  Metoprolol 50 mg.  The patient takes 1-1/2 tablets or 75 mg twice daily.   13.  Travatan 0.004% 1 drop left eye at bedtime.  The patient is to stop taking aspirin.      LABORATORY DATA:  On the day of discharge, white count 4.6, hemoglobin 7.7, platelet count 246,000.  The patient's last electrolytes were from 03/22/2017, sodium 141, potassium 3.8, chloride 112, bicarbonate 21, BUN 56, creatinine 1.56, calcium 8.0.  Lactic acid 321 was 1.0.        CT abdomen and pelvis 03/21/2017 showed no convincing acute abnormality seen.  Stool distends the colon, cholelithiasis, moderate hiatal hernia, more distended than on 2009, indeterminate new hyperdense right renal lesion subcentimeter.  This may be just a small  hemorrhagic cyst.  A solid lesion cannot be completely excluded.  Nonspecific mild subcutaneous edema, diffusely.  Diffuse vascular calcifications.  CT of the head 2017 showed chronic changes but no evidence for intracranial hemorrhage or any acute process.      CONSULTATIONS DURING THIS HOSPITALIZATION:  Gastroenterology.      HOSPITAL COURSE:  Please see dictated history and physical for patient's initial presentation.  Milagros Renae is a very pleasant 89-year-old female with a history of dementia, chronic kidney disease, hypertension, hypothyroidism.  The patient's dementia is fairly significant.  She is not a good historian.  She has been on aspirin since  for suspected stroke or TIA.  The patient was brought in due to altered mental status and melena.  The patient's aspirin was on hold.  The patient was seen by GI.  They elected for conservative approach.  The patient had serial hemoglobins done.  She was put on Protonix b.i.d. and Carafate.  Her hemoglobin was stable.  Last Gastroenterology note from 2017, recommended no further bleeding.  Continue PPI b.i.d. for 8 weeks, then daily indefinitely.  Carafate liquid at night for 14 days.  They did feel the patient was stable to be discharged.  The patient's hemoglobin was 7.7 on the day of discharge.  On  was 7.9, 7.3 and on , it was 7.9 and 7.4.  It appears to be stable.  The patient will be discharged also on some iron and will hold her aspirin.  The patient's other medical problems remain stable.  These include hypertension, chronic renal insufficiency, GERD.  The patient is being discharged to a rehab and then probably long-term care following rehabilitation.      CODE STATUS:  DNR/DNI.      Time for this discharge less than 30 minutes.         BHAVESH BERG MD             D: 2017 11:37   T: 2017 12:43   MT: SATYA      Name:     MILAGROS RENAE   MRN:      [id number]        Account:        [id number]   :       09/17/1923           Admit Date:     [id number]                                  Discharge Date:       Document: [id number]       cc: Elo Coyne MD

## 2017-03-24 NOTE — PLAN OF CARE
Problem: Goal Outcome Summary  Goal: Goal Outcome Summary  Outcome: Improving  Pt is SBA with a walker to bathroom, hard of hearing, intermittently confused, no bm on this shift, vitals stable , plan to be discharge today depending on the result of this morning hemoglobin.

## 2017-03-28 ENCOUNTER — HOSPITAL ENCOUNTER (EMERGENCY)
Facility: CLINIC | Age: 82
Discharge: HOME | End: 2017-03-28
Attending: EMERGENCY MEDICINE | Admitting: EMERGENCY MEDICINE
Payer: MEDICARE

## 2017-03-28 ENCOUNTER — TRANSFERRED RECORDS (OUTPATIENT)
Dept: HEALTH INFORMATION MANAGEMENT | Facility: CLINIC | Age: 82
End: 2017-03-28

## 2017-03-28 VITALS
HEIGHT: 65 IN | OXYGEN SATURATION: 98 % | SYSTOLIC BLOOD PRESSURE: 145 MMHG | DIASTOLIC BLOOD PRESSURE: 71 MMHG | BODY MASS INDEX: 20.66 KG/M2 | TEMPERATURE: 98.3 F | RESPIRATION RATE: 16 BRPM | WEIGHT: 124 LBS

## 2017-03-28 DIAGNOSIS — N28.9 RENAL INSUFFICIENCY: ICD-10-CM

## 2017-03-28 DIAGNOSIS — D64.9 ANEMIA, UNSPECIFIED TYPE: ICD-10-CM

## 2017-03-28 LAB
ABO + RH BLD: NORMAL
ABO + RH BLD: NORMAL
ANION GAP SERPL CALCULATED.3IONS-SCNC: 6 MMOL/L (ref 3–14)
BASOPHILS # BLD AUTO: 0.1 10E9/L (ref 0–0.2)
BASOPHILS NFR BLD AUTO: 2 %
BLD GP AB SCN SERPL QL: NORMAL
BLOOD BANK CMNT PATIENT-IMP: NORMAL
BUN SERPL-MCNC: 29 MG/DL (ref 7–30)
CALCIUM SERPL-MCNC: 8.3 MG/DL (ref 8.5–10.1)
CHLORIDE SERPL-SCNC: 104 MMOL/L (ref 94–109)
CO2 SERPL-SCNC: 25 MMOL/L (ref 20–32)
CREAT SERPL-MCNC: 2.08 MG/DL (ref 0.52–1.04)
DIFFERENTIAL METHOD BLD: ABNORMAL
EOSINOPHIL # BLD AUTO: 0.7 10E9/L (ref 0–0.7)
EOSINOPHIL NFR BLD AUTO: 11 %
ERYTHROCYTE [DISTWIDTH] IN BLOOD BY AUTOMATED COUNT: 15.6 % (ref 10–15)
GFR SERPL CREATININE-BSD FRML MDRD: 22 ML/MIN/1.7M2
GLUCOSE SERPL-MCNC: 97 MG/DL (ref 70–99)
HCT VFR BLD AUTO: 23.6 % (ref 35–47)
HEMOCCULT STL QL: NEGATIVE
HGB BLD-MCNC: 7.9 G/DL (ref 11.7–15.7)
INTERPRETATION ECG - MUSE: NORMAL
LYMPHOCYTES # BLD AUTO: 0.6 10E9/L (ref 0.8–5.3)
LYMPHOCYTES NFR BLD AUTO: 9 %
MCH RBC QN AUTO: 32.4 PG (ref 26.5–33)
MCHC RBC AUTO-ENTMCNC: 33.5 G/DL (ref 31.5–36.5)
MCV RBC AUTO: 97 FL (ref 78–100)
MONOCYTES # BLD AUTO: 0.3 10E9/L (ref 0–1.3)
MONOCYTES NFR BLD AUTO: 5 %
NEUTROPHILS # BLD AUTO: 4.8 10E9/L (ref 1.6–8.3)
NEUTROPHILS NFR BLD AUTO: 73 %
PLATELET # BLD AUTO: 367 10E9/L (ref 150–450)
PLATELET # BLD EST: ABNORMAL 10*3/UL
POTASSIUM SERPL-SCNC: 4.4 MMOL/L (ref 3.4–5.3)
RBC # BLD AUTO: 2.44 10E12/L (ref 3.8–5.2)
RBC MORPH BLD: ABNORMAL
SODIUM SERPL-SCNC: 135 MMOL/L (ref 133–144)
SPECIMEN EXP DATE BLD: NORMAL
TROPONIN I SERPL-MCNC: NORMAL UG/L (ref 0–0.04)
WBC # BLD AUTO: 6.6 10E9/L (ref 4–11)

## 2017-03-28 PROCEDURE — 25000125 ZZHC RX 250: Performed by: EMERGENCY MEDICINE

## 2017-03-28 PROCEDURE — 86850 RBC ANTIBODY SCREEN: CPT | Performed by: EMERGENCY MEDICINE

## 2017-03-28 PROCEDURE — 80048 BASIC METABOLIC PNL TOTAL CA: CPT | Performed by: EMERGENCY MEDICINE

## 2017-03-28 PROCEDURE — 85025 COMPLETE CBC W/AUTO DIFF WBC: CPT | Performed by: EMERGENCY MEDICINE

## 2017-03-28 PROCEDURE — 96374 THER/PROPH/DIAG INJ IV PUSH: CPT

## 2017-03-28 PROCEDURE — 82272 OCCULT BLD FECES 1-3 TESTS: CPT | Performed by: EMERGENCY MEDICINE

## 2017-03-28 PROCEDURE — 84484 ASSAY OF TROPONIN QUANT: CPT | Performed by: EMERGENCY MEDICINE

## 2017-03-28 PROCEDURE — 93005 ELECTROCARDIOGRAM TRACING: CPT

## 2017-03-28 PROCEDURE — 86901 BLOOD TYPING SEROLOGIC RH(D): CPT | Performed by: EMERGENCY MEDICINE

## 2017-03-28 PROCEDURE — 99284 EMERGENCY DEPT VISIT MOD MDM: CPT | Mod: 25

## 2017-03-28 PROCEDURE — 86900 BLOOD TYPING SEROLOGIC ABO: CPT | Performed by: EMERGENCY MEDICINE

## 2017-03-28 RX ADMIN — PANTOPRAZOLE SODIUM 40 MG: 40 INJECTION, POWDER, FOR SOLUTION INTRAVENOUS at 12:12

## 2017-03-28 NOTE — ED NOTES
Bed: ED29  Expected date:   Expected time:   Means of arrival:   Comments:  419  89 F low hgb  1115

## 2017-03-28 NOTE — DISCHARGE INSTRUCTIONS
Anemia  Anemia is a condition that occurs when your body does not have enough healthy red blood cells (RBCs). Your RBCs are the parts of your blood that carry oxygen throughout your body. A protein called hemoglobin allows your RBCs to absorb and release oxygen. Without enough RBCs or hemoglobin, your body doesn't get enough oxygen. Symptoms of anemia may then occur.    Symptoms of anemia  Some people with anemia have no symptoms. But most people have symptoms that range from mild to severe. These can include:    Tiredness (fatigue)    Weakness    Pale skin    Shortness of breath    Dizziness or fainting    Rapid heartbeat    Trouble doing normal amounts of activity    Jaundice (yellowing of your eyes, skin, or mouth; dark urine)  Causes of anemia  Anemia can occur when your body:    Loses too much blood    Does not make enough RBCs    Destroys your RBCs at a faster rate than it can replace them    Does not make a normal amount of hemoglobin in your RBCs  These problems can occur for many reasons, including:    A condition that you are born with (congenital or inherited). This includes sickle cell disease or thalassemia.    Heavy bleeding for any reason, including injury, surgery, childbirth, or even heavy menstrual periods.    Being low in certain nutrients, such as iron, folate, or vitamin B12. This may be due to poor diet. Also, a condition like celiac disease or Crohn's disease can cause poor absorption of these nutrients    Certain chronic conditions like diabetes, arthritis, or kidney disease.    Certain chronic infections like tuberculosis or HIV.    Exposure to certain medications, such as those used for chemotherapy.  There are different types of anemia. Your doctor can tell you more about the type of anemia you have and what may have caused it.  Diagnosing anemia  To diagnose anemia, your doctor gives you blood tests. These can include:    Complete blood cell count (CBC). This test measures the amounts  of the different types of blood cells.    Blood smear. This test checks the size and shape of your blood cells. To perform the test, your doctor views a drop of your blood under a microscope. Your doctor uses a stain to make the blood cells easier to see.    Iron studies. These tests measure the amount of iron in your blood. Your body needs iron to make hemoglobin in your RBCs.    Vitamin B12 and folate studies. These tests check for some of the components that help give RBCs a normal size and shape.    Reticulocyte count. This test measures the amount of new RBCs that your bone marrow makes.    Hemoglobin electrophoresis. This test checks for problems with your hemoglobin in RBCs.  Treating anemia  Treatment for anemia is based on the type of anemia, its cause, and the severity of your symptoms. Treatments may include:    Diet changes. This involves increasing the amount of certain nutrients in your diet, such as iron, vitamin B12, or folate. Your doctor may also prescribe nutrient supplements.    Medications. Certain medications treat the cause of your anemia. Others help build new RBCs or relieve symptoms. If a medication is the cause of your anemia, you may need to stop or change it.    Blood transfusions. Replacing some of your blood can increase the number of healthy RBCs in your body.    Surgery. In some cases, your doctor can do surgery to treat the underlying cause of anemia. If you need surgery, your doctor will explain the procedure and outline the risks and benefits for you.  Long-term concerns  If you have a certain type of anemia, you can expect a full recovery after treatment. If you have other types of anemia (especially a type you're born with), you will need to manage it for life. Your doctor can tell you more.    9612-4341 [employer]. [address] [place] [address], [place] [address]. All rights reserved. This information is not intended as a substitute for professional medical care. Always  follow your healthcare professional's instructions.

## 2017-03-28 NOTE — ED AVS SNAPSHOT
Emergency Department    [address] [facility] [address]    Phone:  [phone]    Fax:  [phone]                                       Milagros Marie   MRN: [id number]    Department:   Emergency Department   Date of Visit:  3/28/2017           Patient Information     Date Of Birth          9/17/1923        Your diagnoses for this visit were:     Anemia, unspecified type     Renal insufficiency        You were seen by Deborah Frias MD.      Follow-up Information     Follow up with Maria Eugenia Holley MD In 3 days.    Specialty:  Family Practice    Why:  Recheck hemoglobin on Thursday. Drink 6 6 ounce glasses of fluid daily    Contact information:    [facility]  [address]  [facility] [address]  [phone]          Discharge Instructions         Anemia  Anemia is a condition that occurs when your body does not have enough healthy red blood cells (RBCs). Your RBCs are the parts of your blood that carry oxygen throughout your body. A protein called hemoglobin allows your RBCs to absorb and release oxygen. Without enough RBCs or hemoglobin, your body doesn't get enough oxygen. Symptoms of anemia may then occur.    Symptoms of anemia  Some people with anemia have no symptoms. But most people have symptoms that range from mild to severe. These can include:    Tiredness (fatigue)    Weakness    Pale skin    Shortness of breath    Dizziness or fainting    Rapid heartbeat    Trouble doing normal amounts of activity    Jaundice (yellowing of your eyes, skin, or mouth; dark urine)  Causes of anemia  Anemia can occur when your body:    Loses too much blood    Does not make enough RBCs    Destroys your RBCs at a faster rate than it can replace them    Does not make a normal amount of hemoglobin in your RBCs  These problems can occur for many reasons, including:    A condition that you are born with (congenital or inherited). This includes sickle cell disease or  thalassemia.    Heavy bleeding for any reason, including injury, surgery, childbirth, or even heavy menstrual periods.    Being low in certain nutrients, such as iron, folate, or vitamin B12. This may be due to poor diet. Also, a condition like celiac disease or Crohn's disease can cause poor absorption of these nutrients    Certain chronic conditions like diabetes, arthritis, or kidney disease.    Certain chronic infections like tuberculosis or HIV.    Exposure to certain medications, such as those used for chemotherapy.  There are different types of anemia. Your doctor can tell you more about the type of anemia you have and what may have caused it.  Diagnosing anemia  To diagnose anemia, your doctor gives you blood tests. These can include:    Complete blood cell count (CBC). This test measures the amounts of the different types of blood cells.    Blood smear. This test checks the size and shape of your blood cells. To perform the test, your doctor views a drop of your blood under a microscope. Your doctor uses a stain to make the blood cells easier to see.    Iron studies. These tests measure the amount of iron in your blood. Your body needs iron to make hemoglobin in your RBCs.    Vitamin B12 and folate studies. These tests check for some of the components that help give RBCs a normal size and shape.    Reticulocyte count. This test measures the amount of new RBCs that your bone marrow makes.    Hemoglobin electrophoresis. This test checks for problems with your hemoglobin in RBCs.  Treating anemia  Treatment for anemia is based on the type of anemia, its cause, and the severity of your symptoms. Treatments may include:    Diet changes. This involves increasing the amount of certain nutrients in your diet, such as iron, vitamin B12, or folate. Your doctor may also prescribe nutrient supplements.    Medications. Certain medications treat the cause of your anemia. Others help build new RBCs or relieve symptoms. If  a medication is the cause of your anemia, you may need to stop or change it.    Blood transfusions. Replacing some of your blood can increase the number of healthy RBCs in your body.    Surgery. In some cases, your doctor can do surgery to treat the underlying cause of anemia. If you need surgery, your doctor will explain the procedure and outline the risks and benefits for you.  Long-term concerns  If you have a certain type of anemia, you can expect a full recovery after treatment. If you have other types of anemia (especially a type you're born with), you will need to manage it for life. Your doctor can tell you more.    4180-3778 [employer]. [address] [place] [address], [place] [address]. All rights reserved. This information is not intended as a substitute for professional medical care. Always follow your healthcare professional's instructions.          Discharge References/Attachments     RENAL INSUFFICIENCY (ENGLISH)      24 Hour Appointment Hotline       To make an appointment at any [facility], call [phone] ([phone]). If you don't have a family doctor or clinic, we will help you find one. [facility] are conveniently located to serve the needs of you and your family.             Review of your medicines      Our records show that you are taking the medicines listed below. If these are incorrect, please call your family doctor or clinic.        Dose / Directions Last dose taken    ACETAMINOPHEN EXTRA STRENGTH 500 MG tablet   Dose:  500 mg   Generic drug:  acetaminophen        Take 500 mg by mouth 3 times daily   Refills:  0        ADVANCED PROBIOTIC Caps   Dose:  1 tablet        Take 1 tablet by mouth daily (with lunch)   Refills:  0        ALLEGRA PO   Dose:  180 mg        Take 180 mg by mouth daily.   Refills:  0        AMLODIPINE BESYLATE PO   Dose:  10 mg        Take 10 mg by mouth daily   Refills:  0        AZOPT 1 % ophthalmic susp   Dose:  1 drop   Generic drug:   brinzolamide        Place 1 drop Into the left eye 2 times daily   Refills:  4        CALCIUM 600 + D PO   Dose:  1 tablet        Take 1 tablet by mouth daily   Refills:  0        cephALEXin 500 MG capsule   Commonly known as:  KEFLEX   Dose:  2000 mg        Take 2,000 mg by mouth as needed Administer one hour prior to dental procedure   Refills:  99        COMBIGAN 0.2-0.5 % ophthalmic solution   Dose:  1 drop   Generic drug:  brimonidine-timolol        Place 1 drop Into the left eye 2 times daily   Refills:  4        ferrous gluconate 324 (38 FE) MG tablet   Commonly known as:  FERGON   Dose:  324 mg   Quantity:  100 tablet        Take 1 tablet (324 mg) by mouth 2 times daily   Refills:  1        irbesartan 300 MG tablet   Commonly known as:  AVAPRO   Dose:  150 mg   Quantity:  30 tablet        Take 0.5 tablets (150 mg) by mouth 2 times daily   Refills:  0        levothyroxine 50 MCG tablet   Commonly known as:  SYNTHROID/LEVOTHROID   Quantity:  90 tablet        TAKE 1 TABLET (50 MCG) BY MOUTH DAILY   Refills:  2        Magnesium 500 MG Caps   Dose:  1 tablet   Indication:  Low Amount of Magnesium in the Blood        Take 1 tablet by mouth daily Daughter does not know salt form   Refills:  0        metoprolol 50 MG tablet   Commonly known as:  LOPRESSOR   Quantity:  270 tablet        TAKE 1 AND 1/2 TABLET TWICE DAILY WITH FOOD OR MILK   Refills:  3        pantoprazole 40 MG EC tablet   Commonly known as:  PROTONIX   Quantity:  60 tablet        Take one tab orally twice a day for 8 weeks, then daily indefinitely,  Take on empty stomach about half an hour before eating.   Refills:  2        sucralfate 1 GM/10ML suspension   Commonly known as:  CARAFATE   Dose:  1 g   Quantity:  140 mL        Take 10 mLs (1 g) by mouth At Bedtime for 14 days   Refills:  0        TRAVATAN 0.004 % ophthalmic solution   Dose:  1 drop   Generic drug:  travoprost Z (benzalkonium)        1 drop. One drop in left eye at bedtime.   Refills:   0                Procedures and tests performed during your visit     ABO/Rh type and screen    Basic metabolic panel    CBC with platelets differential    EKG 12 lead    Occult blood stool    Troponin I      Orders Needing Specimen Collection     None      Pending Results     No orders found from 3/26/2017 to 3/29/2017.            Pending Culture Results     No orders found from 3/26/2017 to 3/29/2017.             Test Results from your hospital stay     3/28/2017  1:18 PM - Interface, Flexilab Results      Component Results     Component Value Ref Range & Units Status    WBC 6.6 4.0 - 11.0 10e9/L Final    RBC Count 2.44 (L) 3.8 - 5.2 10e12/L Final    Hemoglobin 7.9 (L) 11.7 - 15.7 g/dL Final    Hematocrit 23.6 (L) 35.0 - 47.0 % Final    MCV 97 78 - 100 fl Final    MCH 32.4 26.5 - 33.0 pg Final    MCHC 33.5 31.5 - 36.5 g/dL Final    RDW 15.6 (H) 10.0 - 15.0 % Final    Platelet Count 367 150 - 450 10e9/L Final    Diff Method Manual Differential  Final    % Neutrophils 73.0 % Final    % Lymphocytes 9.0 % Final    % Monocytes 5.0 % Final    % Eosinophils 11.0 % Final    % Basophils 2.0 % Final    Absolute Neutrophil 4.8 1.6 - 8.3 10e9/L Final    Absolute Lymphocytes 0.6 (L) 0.8 - 5.3 10e9/L Final    Absolute Monocytes 0.3 0.0 - 1.3 10e9/L Final    Absolute Eosinophils 0.7 0.0 - 0.7 10e9/L Final    Absolute Basophils 0.1 0.0 - 0.2 10e9/L Final    RBC Morphology   Final    Consistent with reported results    Platelet Estimate   Final    Confirming automated cell count         3/28/2017 12:51 PM - Interface, Flexilab Results      Component Results     Component Value Ref Range & Units Status    Sodium 135 133 - 144 mmol/L Final    Potassium 4.4 3.4 - 5.3 mmol/L Final    Chloride 104 94 - 109 mmol/L Final    Carbon Dioxide 25 20 - 32 mmol/L Final    Anion Gap 6 3 - 14 mmol/L Final    Glucose 97 70 - 99 mg/dL Final    Urea Nitrogen 29 7 - 30 mg/dL Final    Creatinine 2.08 (H) 0.52 - 1.04 mg/dL Final    GFR Estimate 22 (L)  >60 mL/min/1.7m2 Final    Non  GFR Calc    GFR Estimate If Black 27 (L) >60 mL/min/1.7m2 Final    African American GFR Calc    Calcium 8.3 (L) 8.5 - 10.1 mg/dL Final         3/28/2017 12:19 PM - Interface, Flexilab Results      Component Results     Component Value Ref Range & Units Status    Occult Blood Negative NEG Final         3/28/2017 12:55 PM - Interface, Flexilab Results      Component Results     Component    ABO    O    RH(D)     Pos    Antibody Screen    Neg    Test Valid Only At    [facility]    Specimen Expires    03/31/2017         3/28/2017 12:56 PM - Interface, Flexilab Results      Component Results     Component Value Ref Range & Units Status    Troponin I ES  0.000 - 0.045 ug/L Final    <0.015  The 99th percentile for upper reference range is 0.045 ug/L.  Troponin values in   the range of 0.045 - 0.120 ug/L may be associated with risks of adverse   clinical events.                  Clinical Quality Measure: Blood Pressure Screening     Your blood pressure was checked while you were in the emergency department today. The last reading we obtained was  BP: 145/71 . Please read the guidelines below about what these numbers mean and what you should do about them.  If your systolic blood pressure (the top number) is less than 120 and your diastolic blood pressure (the bottom number) is less than 80, then your blood pressure is normal. There is nothing more that you need to do about it.  If your systolic blood pressure (the top number) is 120-139 or your diastolic blood pressure (the bottom number) is 80-89, your blood pressure may be higher than it should be. You should have your blood pressure rechecked within a year by a primary care provider.  If your systolic blood pressure (the top number) is 140 or greater or your diastolic blood pressure (the bottom number) is 90 or greater, you may have high blood pressure. High blood pressure is treatable, but if left untreated  "over time it can put you at risk for heart attack, stroke, or kidney failure. You should have your blood pressure rechecked by a primary care provider within the next 4 weeks.  If your provider in the emergency department today gave you specific instructions to follow-up with your doctor or provider even sooner than that, you should follow that instruction and not wait for up to 4 weeks for your follow-up visit.        Thank you for choosing [facility]       Thank you for choosing [facility] for your care. Our goal is always to provide you with excellent care. Hearing back from our patients is one way we can continue to improve our services. Please take a few minutes to complete the written survey that you may receive in the mail after you visit with us. Thank you!        [employer] Information     [employer] lets you send messages to your doctor, view your test results, renew your prescriptions, schedule appointments and more. To sign up, go to [email or web address] . Click on \"Log in\" on the left side of the screen, which will take you to the Welcome page. Then click on \"Sign up Now\" on the right side of the page.     You will be asked to enter the access code listed below, as well as some personal information. Please follow the directions to create your username and password.     Your access code is: [email or web address]  Expires: 2017  7:44 PM     Your access code will  in 90 days. If you need help or a new code, please call your [facility] clinic or [phone].        Care EveryWhere ID     This is your Care EveryWhere ID. This could be used by other organizations to access your [facility] medical records  [id number]        After Visit Summary       This is your record. Keep this with you and show to your community pharmacist(s) and doctor(s) at your next visit.                  "

## 2017-03-28 NOTE — ED PROVIDER NOTES
History     Chief Complaint:  Abnormal Labs       HPI    The patient is a poor history secondary to dementia. Supplemental history provided by the patient's daughter.     Milagros Marie is a 89 year old female with a history of renal failure and hypertension who presents from [facility] with abnormal labs. The patient was admitted from 3/21 through 3/24 for a suspected upper gastrointestinal bleed with melena. While in the hospital, the patient s hemoglobin was trended and noted to be 7.9, 7.4 on 3/22, and 7.9, 7.3 on 3/23. On the day of discharge, the patient s hemoglobin was 7.7.   The patient was seen by Dr. Marley of GI while in the hospital. The patient was started on Protonix, sucralfate and ferrous gluconate. Conservative management was agreed upon since she would not likely tolerate an endoscopy as she would require intubated, Since being discharged from the hospital, the patient has been at [facility] nursing home. The patient's hemoglobin was checked today and was noted to be 6.7 so she was sent to the ED. The patient states that while she was at physical therapy today, they were seeing how long she could stand up without her walker and became light headed while standing. The patient has not had any abdominal pain, nausea, vomiting, chest pain, or shortness of breath.   She did d/c her aspirin.      Allergies:  Sulfa Drugs     Medications:    Protonix  Carafate  Fergon  Levothyroxine  Amlodipine  Lopressor   Avapro  Probiotic   Acetaminophen  Allegra     Past Medical History:    ACP  DJD  Hypertension   Glaucoma  Homocystinemia  Osteopenia  Reflux  Renal failure   Stenosis   Stress incontinence   Hypothyroidism  Dementia    Past Surgical History:    Bladder surgery   Blepharoplasty   Carpal tunnel release  Cataract  Eye surgery   Hysterectomy   Joint replacement knee    Family History:    Heart Disease-Mother, Father  Cancer-Sister    Social History:  Marital Status:   Presents to the ED with  "daughter  Tobacco Use: Never Used  Alcohol Use: Yes  PCP: Maria Eugenia Holley      Review of Systems   Unable to perform ROS: Dementia     Physical Exam   First Vitals:  BP: 143/78  Heart Rate: 62  Temp: 98.3  F (36.8  C)  Resp: 16  Height: 160 cm (5' 3\")  Weight: 56.2 kg (124 lb)  SpO2: 99 %    Physical Exam    Physical Exam   Constitutional:  Patient is oriented to person, place, and time. They appear well-developed and well-nourished. Appears Comfortable  HENT:    Mouth/Throat:   Oropharynx is clear and moist.   Eyes:    Conjunctivae normal and EOM are normal. Pupils are equal, round, and reactive to light. Patient has a cloudy cornea on the right side which is chronic.   Neck:    Normal range of motion.   Cardiovascular: Normal rate, regular rhythm and normal heart sounds.  Exam reveals no gallop and no friction rub.  No murmur heard.  Pulmonary/Chest:  Effort normal and breath sounds normal. Patient has no wheezes. Patient has no rales.   Abdominal:   Soft. Bowel sounds are normal. Patient exhibits no mass. There is no tenderness. There is no rebound and no guarding.   :    No hemorrhoids, no active bleeding. Scant amount of stool for hemoccult exam. Hemoccult is negative.   Musculoskeletal:  Normal range of motion. Patient exhibits no edema.   Neurological:   Generally weak. Memory impairment.Patient has normal strength. No cranial nerve deficit or sensory deficit. GCS 15  Skin:   Skin is warm and dry. No rash noted. No erythema.   Psychiatric:   Patient has a normal mood and affect. Patient's behavior is normal. Judgment and thought content normal.        Emergency Department Course   ECG:  @ 1127  Indication: Light headedness  Vent. Rate 60 bpm. KY interval 222 ms. QRS duration 102 ms. QT/QTc 430/430 ms. P-R-T axis 44 -27 56.   Sinus rhythm with 1st degree AV block. Voltage criteria for left ventricular hypertrophy.  Abnormal ECG.   Read by Dr. Frias.     Laboratory:  Occult Blood: Negative    CBC:  WBC 6.6, " HGB 7.9 (L),   BMP: Creatinine 2.08 (H), GFR 22 (L), Calcium 8.3 (L), otherwise WNL  ABO/Rh Type and Screen: O positive, antibody screen negative   (1131) Troponin I: <0.015    Interventions:   Protonix, 40 mg, IV     Emergency Department Course:  Nursing notes and vitals reviewed.  I performed an exam of the patient as documented above.  EKG was done, interpretation as above.   A peripheral IV was established. Blood was drawn from the patient. This was sent for laboratory testing, findings above.    Findings and plan explained to the patient and her daughter. Patient discharged home with instructions regarding supportive care, medications, and reasons to return. The importance of close follow-up was reviewed.      Impression & Plan    Medical Decision Making:  Milagros Marie is a 89 year old female sent over from [facility] for a low hemoglobin. She was just admitted for concern about an upper GI ulceration but due to her age and fragility, no endoscopy was recommended. The daughter who is a POA also agreed to this and they did medical management. She stopped her aspirin and she has been on Protonix and carafate. They did a routine blood draw today at the facility but she was also light headed while standing for PT when they were seeming how long she could stand for so they sent her over to the emergency department. Their hemoglobin was reportedly in the 6's. I did repeat that here and it is actually two points higher then when she was discharged at 7.9 so I suspect that their lab draw over there was an error. She is vitally stable. She is hemoccult negative here. She has not abdominal pain here on exam. At this point I feel she is safe for discharge. I will write instructions that she needs to have her hemoglobin rechecked in 2 days. She does have a little bit of worsening renal failure and this may account for her lightheadedness, but her cardiac enzyme and the rest of her blood work appears  normal. She did receive a bolus of IV fluids here and also wrote for increased fluids at her care facility. Discharged by Rig.       Diagnosis:    ICD-10-CM    1. Anemia, unspecified type D64.9    2. Renal insufficiency N28.9        Disposition:  discharged to home        Tracy QUACH, am serving as a scribe on 3/28/2017 at 11:47 AM to personally document services performed by Dr. Frias based on my observations and the provider's statements to me.      3/28/2017    EMERGENCY DEPARTMENT       Deborah Frias MD  03/29/17 180

## 2017-03-30 ENCOUNTER — TRANSFERRED RECORDS (OUTPATIENT)
Dept: HEALTH INFORMATION MANAGEMENT | Facility: CLINIC | Age: 82
End: 2017-03-30

## 2017-03-30 LAB
ANION GAP SERPL CALCULATED.3IONS-SCNC: 8 MMOL/L (ref 5–18)
BUN SERPL-MCNC: 27 MG/DL (ref 8–28)
CALCIUM SERPL-MCNC: 8.2 MG/DL (ref 8.5–10.5)
CHLORIDE SERPLBLD-SCNC: 102 MMOL/L (ref 98–107)
CO2 SERPL-SCNC: 22 MMOL/L (ref 22–31)
CREAT SERPL-MCNC: 2.16 MG/DL (ref 0.6–1.1)
ERYTHROCYTE [DISTWIDTH] IN BLOOD BY AUTOMATED COUNT: 15.4 % (ref 11–14.5)
GFR SERPL CREATININE-BSD FRML MDRD: 21 ML/MIN/1.73M2
GLUCOSE SERPL-MCNC: 89 MG/DL (ref 70–125)
HCT VFR BLD AUTO: 21.2 % (ref 35–47)
HEMOGLOBIN: 6.7 G/DL (ref 12–15)
MCH RBC QN AUTO: 32.1 PG (ref 27–34)
MCHC RBC AUTO-ENTMCNC: 31.6 G/DL (ref 32–35)
MCV RBC AUTO: 101 FL (ref 80–100)
PLATELET # BLD AUTO: 333 THOU/UL (ref 140–440)
POTASSIUM SERPL-SCNC: 4 MMOL/L (ref 3.5–5)
RBC # BLD AUTO: 2.09 MILL/UL (ref 3.8–5.4)
SODIUM SERPL-SCNC: 132 MMOL/L (ref 136–145)
WBC # BLD AUTO: 6.1 THOU/UL (ref 4–11)

## 2017-03-31 ENCOUNTER — TRANSFERRED RECORDS (OUTPATIENT)
Dept: HEALTH INFORMATION MANAGEMENT | Facility: CLINIC | Age: 82
End: 2017-03-31

## 2017-03-31 LAB — HEMOGLOBIN: 8 G/DL (ref 12–15)

## 2017-04-04 ENCOUNTER — TRANSFERRED RECORDS (OUTPATIENT)
Dept: HEALTH INFORMATION MANAGEMENT | Facility: CLINIC | Age: 82
End: 2017-04-04

## 2017-04-04 LAB — HEMOGLOBIN: 7.2 G/DL (ref 12–16)

## 2017-04-20 ENCOUNTER — NURSING HOME VISIT (OUTPATIENT)
Dept: GERIATRICS | Facility: CLINIC | Age: 82
End: 2017-04-20
Payer: MEDICARE

## 2017-04-20 VITALS
DIASTOLIC BLOOD PRESSURE: 68 MMHG | OXYGEN SATURATION: 99 % | SYSTOLIC BLOOD PRESSURE: 152 MMHG | HEIGHT: 63 IN | WEIGHT: 132 LBS | HEART RATE: 70 BPM | TEMPERATURE: 97.7 F | RESPIRATION RATE: 18 BRPM | BODY MASS INDEX: 23.39 KG/M2

## 2017-04-20 DIAGNOSIS — K92.2 UPPER GI BLEED: Primary | ICD-10-CM

## 2017-04-20 DIAGNOSIS — D50.0 IRON DEFICIENCY ANEMIA DUE TO CHRONIC BLOOD LOSS: ICD-10-CM

## 2017-04-20 DIAGNOSIS — I10 BENIGN ESSENTIAL HYPERTENSION: ICD-10-CM

## 2017-04-20 DIAGNOSIS — E03.0 CONGENITAL HYPOTHYROIDISM WITH DIFFUSE GOITER: ICD-10-CM

## 2017-04-20 DIAGNOSIS — N19 RENAL FAILURE: ICD-10-CM

## 2017-04-20 PROCEDURE — 99207 ZZC CDG-CORRECTLY CODED, REVIEWED AND AGREE: CPT | Performed by: NURSE PRACTITIONER

## 2017-04-20 PROCEDURE — 99310 SBSQ NF CARE HIGH MDM 45: CPT | Performed by: NURSE PRACTITIONER

## 2017-04-20 NOTE — PROGRESS NOTES
[facility]  PRIMARY CARE PROVIDER AND CLINIC:  Maria Eugenia Holley [facility] [address] / [facility]*  Chief Complaint   Patient presents with     [facility]       HPI:    Milagros Marie is a 89 year old  (9/17/1923),admitted to the [facility] from [facility].  Hospital stay 3/21/17 through 3/24/17.  Admitted to this facility for  medical management and nursing care. Current issues are:     She has a history of dementia, chronic kidney disease, hypertension, and hypothyroidism. No family present at today's visit and unable to obtain HPI d/t extensive cognitive impairment. Denies pain, states she is eating and sleeping well. Just continues to be tired.       CODE STATUS/ADVANCE DIRECTIVES DISCUSSION:    Patient's living condition: Board and Care    ALLERGIES:Sulfa drugs  PAST MEDICAL HISTORY:  has a past medical history of ACP (advance care planning); DJD (degenerative joint disease); Essential hypertension, benign; Glaucoma; Homocysteinemia (H); HTN (hypertension); Osteopenia; Reflux; Renal failure (2008); Stenosis; Stress incontinence, female; and Unspecified hypothyroidism.  PAST SURGICAL HISTORY:  has a past surgical history that includes joint replacemtn, knee rt/lt; Hysterectomy; carpal tunnel release rt/lt; cataract iol, rt/lt; Blepharoplasty; Eye surgery; and Bladder surgery.  FAMILY HISTORY: family history includes CANCER in her sister; Cancer - colorectal in her sister; HEART DISEASE in her father and mother.  SOCIAL HISTORY:  reports that she has never smoked. She has never used smokeless tobacco. She reports that she drinks alcohol.    Post Discharge Medication Reconciliation Status: discharge medications reconciled, continue medications without change.  Current Outpatient Prescriptions   Medication Sig Dispense Refill     pantoprazole (PROTONIX) 40 MG EC tablet Take one tab orally twice a day for 8 weeks, then  "daily indefinitely,  Take on empty stomach about half an hour before eating. 60 tablet 2     ferrous gluconate (FERGON) 324 (38 FE) MG tablet Take 1 tablet (324 mg) by mouth 2 times daily 100 tablet 1     levothyroxine (SYNTHROID/LEVOTHROID) 50 MCG tablet TAKE 1 TABLET (50 MCG) BY MOUTH DAILY 90 tablet 2     AMLODIPINE BESYLATE PO Take 10 mg by mouth daily       metoprolol (LOPRESSOR) 50 MG tablet TAKE 1 AND 1/2 TABLET TWICE DAILY WITH FOOD OR MILK 270 tablet 3     AZOPT 1 % ophthalmic suspension Place 1 drop Into the left eye 2 times daily   4     cephALEXin (KEFLEX) 500 MG capsule Take 2,000 mg by mouth as needed Administer one hour prior to dental procedure  99     irbesartan (AVAPRO) 300 MG tablet Take 0.5 tablets (150 mg) by mouth 2 times daily 30 tablet      Probiotic Product (ADVANCED PROBIOTIC) CAPS Take 1 tablet by mouth daily (with lunch)        acetaminophen (ACETAMINOPHEN EXTRA STRENGTH) 500 MG tablet Take 500 mg by mouth 3 times daily        COMBIGAN 0.2-0.5 % ophthalmic solution Place 1 drop Into the left eye 2 times daily  4     Fexofenadine HCl (ALLEGRA PO) Take 180 mg by mouth daily.        travoprost Z, benzalkonium, (TRAVATAN) 0.004 % ophthalmic solution 1 drop. One drop in left eye at bedtime.        Magnesium 500 MG CAPS Take 1 tablet by mouth daily Daughter does not know salt form       Calcium Carbonate-Vitamin D (CALCIUM 600 + D OR) Take 1 tablet by mouth daily          ROS:  4 point ROS including Respiratory, CV, GI and , other than that noted in the HPI,  is negative    Exam:  /68  Pulse 70  Temp 97.7  F (36.5  C)  Resp 18  Ht 5' 3\" (1.6 m)  Wt 132 lb (59.9 kg)  SpO2 99%  BMI 23.38 kg/m2  GENERAL APPEARANCE:  Alert, in no distress  RESP:  respiratory effort and palpation of chest normal, lungs clear to auscultation   CV:  Palpation and auscultation of heart done , regular rate and rhythm, no murmur, rub, or gallop  M/S:   Gait and station normal  Digits and nails " normal  SKIN:  Inspection of skin and subcutaneous tissue baseline, Palpation of skin and subcutaneous tissue baseline  NEURO:   Cranial nerves 2-12 are normal tested and grossly at patient's baseline  PSYCH:  memory impaired , affect and mood normal    Lab/Diagnostic data:     WBC   Date Value Ref Range Status   03/28/2017 6.6 4.0 - 11.0 10e9/L Final   ]  Hemoglobin   Date Value Ref Range Status   03/28/2017 7.9 (L) 11.7 - 15.7 g/dL Final   03/24/2017 7.7 (L) 11.7 - 15.7 g/dL Final   ]  Last Basic Metabolic Panel:  Lab Results   Component Value Date     03/28/2017      Lab Results   Component Value Date    POTASSIUM 4.4 03/28/2017     Lab Results   Component Value Date    SAMSON 8.3 03/28/2017     Lab Results   Component Value Date    CO2 25 03/28/2017     Lab Results   Component Value Date    BUN 29 03/28/2017     Lab Results   Component Value Date    CR 2.08 03/28/2017     Lab Results   Component Value Date    GLC 97 03/28/2017       ASSESSMENT/PLAN:  (K92.2) Upper GI bleed  (primary encounter diagnosis)  -Received 1 unit of PRBC during hospitalization.   -Complains of some fatigue.   -Last hemoglobin 7.9 on 3/2817  -CBC next lab day  -Continue Protonix 40 mg PO BID  -PTA ASA discontinued during hospitalization.     (N19) Renal failure  GFR Estimate   Date Value Ref Range Status   03/28/2017 22 (L) >60 mL/min/1.7m2 Final     Comment:     Non  GFR Calc   03/22/2017 31 (L) >60 mL/min/1.7m2 Final     Comment:     Non  GFR Calc   03/21/2017 26 (L) >60 mL/min/1.7m2 Final     Comment:     Non  GFR Calc   -Avoid nephrotoxic medications  -BMP next lab day    (D64.9) Anemia  -Continue to follow hemoglobins for stability     (I10) Benign essential hypertension  -Chronic, stable.   -Continue metoprolol as ordered.   -Keep SBP> 130 mmHg and DBP > 65 mmHg (levels below these increase mortality as shown by standard studies and observations).     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  -Check TSH next lab day, and keep level b/w 4-5 in elderly.      Information reviewed:  Medications, vital signs, orders, nursing notes, problem list, hospital information. Total time spent with patient visit was 45 min including patient visit, review of past records and phone call to patient contact. Greater than 50% of total time spent with counseling and coordinating care.    Electronically signed by:  Tamika Phillips NP

## 2017-04-23 PROBLEM — D64.9 ANEMIA: Status: ACTIVE | Noted: 2017-04-23

## 2017-04-24 ENCOUNTER — TRANSFERRED RECORDS (OUTPATIENT)
Dept: HEALTH INFORMATION MANAGEMENT | Facility: CLINIC | Age: 82
End: 2017-04-24

## 2017-05-01 ENCOUNTER — TELEPHONE (OUTPATIENT)
Dept: GERIATRICS | Facility: CLINIC | Age: 82
End: 2017-05-01

## 2017-05-01 ENCOUNTER — TRANSFERRED RECORDS (OUTPATIENT)
Dept: HEALTH INFORMATION MANAGEMENT | Facility: CLINIC | Age: 82
End: 2017-05-01

## 2017-05-01 NOTE — TELEPHONE ENCOUNTER
Writer notified by nursing staff that Milagros is complaining of left knee pain. She had fallen last week and initially complained of left hip pain. Left hip was x-rayed and negative for fracture. No further imaging was performed. Norco was ordered for pain.     Plan:  1.) Stat x-ray of left knee  2.) Continue norco as ordered for pain control       Tamika Phillips, BERNARD, CNP

## 2017-05-02 ENCOUNTER — HOSPITAL ENCOUNTER (INPATIENT)
Facility: CLINIC | Age: 82
LOS: 4 days | Discharge: NURSING, REHAB OR LONG-TERM CARE | DRG: 641 | End: 2017-05-06
Attending: NURSE PRACTITIONER | Admitting: INTERNAL MEDICINE
Payer: MEDICARE

## 2017-05-02 ENCOUNTER — TRANSFERRED RECORDS (OUTPATIENT)
Dept: HEALTH INFORMATION MANAGEMENT | Facility: CLINIC | Age: 82
End: 2017-05-02

## 2017-05-02 DIAGNOSIS — M62.81 GENERALIZED MUSCLE WEAKNESS: ICD-10-CM

## 2017-05-02 DIAGNOSIS — E87.1 HYPONATREMIA: ICD-10-CM

## 2017-05-02 DIAGNOSIS — R53.83 OTHER FATIGUE: Primary | ICD-10-CM

## 2017-05-02 LAB
ALBUMIN SERPL-MCNC: 3.6 G/DL (ref 3.4–5)
ALBUMIN UR-MCNC: 30 MG/DL
ALP SERPL-CCNC: 147 U/L (ref 40–150)
ALT SERPL W P-5'-P-CCNC: 23 U/L (ref 0–50)
ANION GAP SERPL CALCULATED.3IONS-SCNC: 8 MMOL/L (ref 3–14)
APPEARANCE UR: CLEAR
AST SERPL W P-5'-P-CCNC: 19 U/L (ref 0–45)
BACTERIA #/AREA URNS HPF: ABNORMAL /HPF
BASOPHILS # BLD AUTO: 0.1 10E9/L (ref 0–0.2)
BASOPHILS NFR BLD AUTO: 1.3 %
BILIRUB SERPL-MCNC: 0.4 MG/DL (ref 0.2–1.3)
BILIRUB UR QL STRIP: NEGATIVE
BUN SERPL-MCNC: 39 MG/DL (ref 7–30)
CALCIUM SERPL-MCNC: 8.9 MG/DL (ref 8.5–10.1)
CHLORIDE SERPL-SCNC: 94 MMOL/L (ref 94–109)
CO2 SERPL-SCNC: 23 MMOL/L (ref 20–32)
COLOR UR AUTO: YELLOW
CREAT SERPL-MCNC: 1.9 MG/DL (ref 0.52–1.04)
DIFFERENTIAL METHOD BLD: ABNORMAL
EOSINOPHIL # BLD AUTO: 0.7 10E9/L (ref 0–0.7)
EOSINOPHIL NFR BLD AUTO: 8.2 %
ERYTHROCYTE [DISTWIDTH] IN BLOOD BY AUTOMATED COUNT: 13.7 % (ref 10–15)
GFR SERPL CREATININE-BSD FRML MDRD: 25 ML/MIN/1.7M2
GLUCOSE SERPL-MCNC: 102 MG/DL (ref 70–99)
GLUCOSE UR STRIP-MCNC: NEGATIVE MG/DL
HCT VFR BLD AUTO: 28.6 % (ref 35–47)
HGB BLD-MCNC: 9.8 G/DL (ref 11.7–15.7)
HGB UR QL STRIP: NEGATIVE
IMM GRANULOCYTES # BLD: 0 10E9/L (ref 0–0.4)
IMM GRANULOCYTES NFR BLD: 0.3 %
INTERPRETATION ECG - MUSE: NORMAL
KETONES UR STRIP-MCNC: NEGATIVE MG/DL
LEUKOCYTE ESTERASE UR QL STRIP: ABNORMAL
LYMPHOCYTES # BLD AUTO: 1.1 10E9/L (ref 0.8–5.3)
LYMPHOCYTES NFR BLD AUTO: 13.3 %
MCH RBC QN AUTO: 31.3 PG (ref 26.5–33)
MCHC RBC AUTO-ENTMCNC: 34.3 G/DL (ref 31.5–36.5)
MCV RBC AUTO: 91 FL (ref 78–100)
MONOCYTES # BLD AUTO: 0.9 10E9/L (ref 0–1.3)
MONOCYTES NFR BLD AUTO: 11.9 %
NEUTROPHILS # BLD AUTO: 5.2 10E9/L (ref 1.6–8.3)
NEUTROPHILS NFR BLD AUTO: 65 %
NITRATE UR QL: NEGATIVE
NON-SQ EPI CELLS #/AREA URNS LPF: ABNORMAL /LPF
NRBC # BLD AUTO: 0 10*3/UL
NRBC BLD AUTO-RTO: 0 /100
OSMOLALITY UR: 255 MMOL/KG (ref 100–1200)
PH UR STRIP: 7 PH (ref 5–7)
PLATELET # BLD AUTO: 405 10E9/L (ref 150–450)
POTASSIUM SERPL-SCNC: 5.3 MMOL/L (ref 3.4–5.3)
PROT SERPL-MCNC: 7.5 G/DL (ref 6.8–8.8)
RBC # BLD AUTO: 3.13 10E12/L (ref 3.8–5.2)
RBC #/AREA URNS AUTO: ABNORMAL /HPF (ref 0–2)
SODIUM SERPL-SCNC: 125 MMOL/L (ref 133–144)
SP GR UR STRIP: 1.01 (ref 1–1.03)
TRANS CELLS #/AREA URNS HPF: ABNORMAL /HPF
URN SPEC COLLECT METH UR: ABNORMAL
UROBILINOGEN UR STRIP-ACNC: 0.2 EU/DL (ref 0.2–1)
WBC # BLD AUTO: 7.9 10E9/L (ref 4–11)
WBC #/AREA URNS AUTO: ABNORMAL /HPF (ref 0–2)

## 2017-05-02 PROCEDURE — 99223 1ST HOSP IP/OBS HIGH 75: CPT | Mod: AI | Performed by: INTERNAL MEDICINE

## 2017-05-02 PROCEDURE — 96360 HYDRATION IV INFUSION INIT: CPT

## 2017-05-02 PROCEDURE — 12000000 ZZH R&B MED SURG/OB

## 2017-05-02 PROCEDURE — 85025 COMPLETE CBC W/AUTO DIFF WBC: CPT | Performed by: NURSE PRACTITIONER

## 2017-05-02 PROCEDURE — 81001 URINALYSIS AUTO W/SCOPE: CPT | Performed by: NURSE PRACTITIONER

## 2017-05-02 PROCEDURE — 96361 HYDRATE IV INFUSION ADD-ON: CPT

## 2017-05-02 PROCEDURE — 80053 COMPREHEN METABOLIC PANEL: CPT | Performed by: NURSE PRACTITIONER

## 2017-05-02 PROCEDURE — 99285 EMERGENCY DEPT VISIT HI MDM: CPT | Mod: 25

## 2017-05-02 PROCEDURE — A9270 NON-COVERED ITEM OR SERVICE: HCPCS | Mod: GY | Performed by: INTERNAL MEDICINE

## 2017-05-02 PROCEDURE — 83935 ASSAY OF URINE OSMOLALITY: CPT | Performed by: INTERNAL MEDICINE

## 2017-05-02 PROCEDURE — 25000128 H RX IP 250 OP 636: Performed by: NURSE PRACTITIONER

## 2017-05-02 PROCEDURE — 25000132 ZZH RX MED GY IP 250 OP 250 PS 637: Mod: GY | Performed by: INTERNAL MEDICINE

## 2017-05-02 PROCEDURE — 25000128 H RX IP 250 OP 636: Performed by: INTERNAL MEDICINE

## 2017-05-02 PROCEDURE — 93005 ELECTROCARDIOGRAM TRACING: CPT

## 2017-05-02 RX ORDER — ONDANSETRON 4 MG/1
4 TABLET, ORALLY DISINTEGRATING ORAL EVERY 6 HOURS PRN
Status: DISCONTINUED | OUTPATIENT
Start: 2017-05-02 | End: 2017-05-06 | Stop reason: HOSPADM

## 2017-05-02 RX ORDER — TRAVOPROST OPHTHALMIC SOLUTION 0.04 MG/ML
1 SOLUTION OPHTHALMIC AT BEDTIME
Status: DISCONTINUED | OUTPATIENT
Start: 2017-05-02 | End: 2017-05-06 | Stop reason: HOSPADM

## 2017-05-02 RX ORDER — PROCHLORPERAZINE MALEATE 5 MG
5 TABLET ORAL EVERY 6 HOURS PRN
Status: DISCONTINUED | OUTPATIENT
Start: 2017-05-02 | End: 2017-05-06 | Stop reason: HOSPADM

## 2017-05-02 RX ORDER — DORZOLAMIDE HCL 20 MG/ML
1 SOLUTION/ DROPS OPHTHALMIC 2 TIMES DAILY
COMMUNITY
End: 2017-09-25

## 2017-05-02 RX ORDER — BRIMONIDINE TARTRATE AND TIMOLOL MALEATE 2; 5 MG/ML; MG/ML
1 SOLUTION OPHTHALMIC 2 TIMES DAILY
Status: DISCONTINUED | OUTPATIENT
Start: 2017-05-02 | End: 2017-05-06 | Stop reason: HOSPADM

## 2017-05-02 RX ORDER — PANTOPRAZOLE SODIUM 40 MG/1
40 TABLET, DELAYED RELEASE ORAL
Status: DISCONTINUED | OUTPATIENT
Start: 2017-05-03 | End: 2017-05-06 | Stop reason: HOSPADM

## 2017-05-02 RX ORDER — SODIUM CHLORIDE 9 MG/ML
INJECTION, SOLUTION INTRAVENOUS CONTINUOUS
Status: DISCONTINUED | OUTPATIENT
Start: 2017-05-02 | End: 2017-05-04

## 2017-05-02 RX ORDER — AMLODIPINE BESYLATE 10 MG/1
10 TABLET ORAL DAILY
Status: DISCONTINUED | OUTPATIENT
Start: 2017-05-03 | End: 2017-05-06 | Stop reason: HOSPADM

## 2017-05-02 RX ORDER — ACETAMINOPHEN 650 MG/1
650 SUPPOSITORY RECTAL EVERY 4 HOURS PRN
Status: DISCONTINUED | OUTPATIENT
Start: 2017-05-02 | End: 2017-05-06 | Stop reason: HOSPADM

## 2017-05-02 RX ORDER — PROCHLORPERAZINE 25 MG
12.5 SUPPOSITORY, RECTAL RECTAL EVERY 12 HOURS PRN
Status: DISCONTINUED | OUTPATIENT
Start: 2017-05-02 | End: 2017-05-06 | Stop reason: HOSPADM

## 2017-05-02 RX ORDER — SODIUM CHLORIDE 9 MG/ML
INJECTION, SOLUTION INTRAVENOUS ONCE
Status: COMPLETED | OUTPATIENT
Start: 2017-05-02 | End: 2017-05-02

## 2017-05-02 RX ORDER — ACETAMINOPHEN 500 MG
500 TABLET ORAL 3 TIMES DAILY
Status: DISCONTINUED | OUTPATIENT
Start: 2017-05-02 | End: 2017-05-06 | Stop reason: HOSPADM

## 2017-05-02 RX ORDER — HYDROCODONE BITARTRATE AND ACETAMINOPHEN 5; 325 MG/1; MG/1
.5-1 TABLET ORAL EVERY 4 HOURS PRN
Status: DISCONTINUED | OUTPATIENT
Start: 2017-05-02 | End: 2017-05-06 | Stop reason: HOSPADM

## 2017-05-02 RX ORDER — NALOXONE HYDROCHLORIDE 0.4 MG/ML
.1-.4 INJECTION, SOLUTION INTRAMUSCULAR; INTRAVENOUS; SUBCUTANEOUS
Status: DISCONTINUED | OUTPATIENT
Start: 2017-05-02 | End: 2017-05-06 | Stop reason: HOSPADM

## 2017-05-02 RX ORDER — BISACODYL 10 MG
10 SUPPOSITORY, RECTAL RECTAL DAILY PRN
Status: DISCONTINUED | OUTPATIENT
Start: 2017-05-02 | End: 2017-05-06 | Stop reason: HOSPADM

## 2017-05-02 RX ORDER — ACETAMINOPHEN 325 MG/1
650 TABLET ORAL EVERY 4 HOURS PRN
Status: DISCONTINUED | OUTPATIENT
Start: 2017-05-02 | End: 2017-05-06 | Stop reason: HOSPADM

## 2017-05-02 RX ORDER — AMOXICILLIN 250 MG
1-2 CAPSULE ORAL 2 TIMES DAILY PRN
Status: DISCONTINUED | OUTPATIENT
Start: 2017-05-02 | End: 2017-05-06 | Stop reason: HOSPADM

## 2017-05-02 RX ORDER — DORZOLAMIDE HCL 20 MG/ML
1 SOLUTION/ DROPS OPHTHALMIC 2 TIMES DAILY
Status: DISCONTINUED | OUTPATIENT
Start: 2017-05-02 | End: 2017-05-06 | Stop reason: HOSPADM

## 2017-05-02 RX ORDER — HYDROCODONE BITARTRATE AND ACETAMINOPHEN 5; 325 MG/1; MG/1
1 TABLET ORAL EVERY 6 HOURS PRN
Status: ON HOLD | COMMUNITY
End: 2017-05-06

## 2017-05-02 RX ORDER — ONDANSETRON 2 MG/ML
4 INJECTION INTRAMUSCULAR; INTRAVENOUS EVERY 6 HOURS PRN
Status: DISCONTINUED | OUTPATIENT
Start: 2017-05-02 | End: 2017-05-06 | Stop reason: HOSPADM

## 2017-05-02 RX ORDER — IRBESARTAN 150 MG/1
150 TABLET ORAL
Status: DISCONTINUED | OUTPATIENT
Start: 2017-05-03 | End: 2017-05-06 | Stop reason: HOSPADM

## 2017-05-02 RX ADMIN — ACETAMINOPHEN 500 MG: 500 TABLET, FILM COATED ORAL at 22:45

## 2017-05-02 RX ADMIN — METOPROLOL TARTRATE 75 MG: 50 TABLET, FILM COATED ORAL at 22:45

## 2017-05-02 RX ADMIN — SODIUM CHLORIDE: 9 INJECTION, SOLUTION INTRAVENOUS at 19:37

## 2017-05-02 RX ADMIN — SODIUM CHLORIDE: 9 INJECTION, SOLUTION INTRAVENOUS at 22:46

## 2017-05-02 NOTE — IP AVS SNAPSHOT
[facility] [address] [facility]    [address] [place] [address]    Phone:  [phone]                                       After Visit Summary   5/2/2017    Milagros Marie    MRN: [id number]           After Visit Summary Signature Page     I have received my discharge instructions, and my questions have been answered. I have discussed any challenges I see with this plan with the nurse or doctor.    ..........................................................................................................................................  Patient/Patient Representative Signature      ..........................................................................................................................................  Patient Representative Print Name and Relationship to Patient    ..................................................               ................................................  Date                                            Time    ..........................................................................................................................................  Reviewed by Signature/Title    ...................................................              ..............................................  Date                                                            Time

## 2017-05-02 NOTE — ED NOTES
Bed: FT03  Expected date: 5/2/17  Expected time: 4:47 PM  Means of arrival: Ambulance  Comments:  429 89f needs Na level drawn

## 2017-05-02 NOTE — IP AVS SNAPSHOT
"          [place] [address] [facility]: [phone]                                              INTERAGENCY TRANSFER FORM - LAB / IMAGING / EKG / EMG RESULTS   2017                    Hospital Admission Date: 2017  CHELLY RENAE   : 1923  Sex: Female        Attending Provider: Nick Pruitt DO     Allergies:  Sulfa Drugs    Infection:  None   Service:  HOSPITALIST    Ht:  1.6 m (5' 3\")   Wt:  61.1 kg (134 lb 11.2 oz)   Admission Wt:  61.2 kg (134 lb 14.7 oz)    BMI:  23.86 kg/m 2   BSA:  1.65 m 2            Patient PCP Information     Provider PCP Type    Maria Eugenia Holley MD General         Lab Results - 3 Days      Basic metabolic panel [[id number]] (Abnormal)  Resulted: 17 0751, Result status: Final result    Ordering provider: Skinny Espana MD  17 0000 Resulting lab: [facility]    Specimen Information    Type Source Collected On   Blood  17 0715          Components       Value Reference Range Flag Lab   Sodium 134 133 - 144 mmol/L  FrStHsLb   Potassium 4.5 3.4 - 5.3 mmol/L  FrStHsLb   Chloride 102 94 - 109 mmol/L  FrStHsLb   Carbon Dioxide 21 20 - 32 mmol/L  FrStHsLb   Anion Gap 11 3 - 14 mmol/L  FrStHsLb   Glucose 91 70 - 99 mg/dL  FrStHsLb   Urea Nitrogen 35 7 - 30 mg/dL H FrStHsLb   Creatinine 1.73 0.52 - 1.04 mg/dL H FrStHsLb   GFR Estimate 27 >60 mL/min/1.7m2 L FrStHsLb   Comment:  Non  GFR Calc   GFR Estimate If Black 33 >60 mL/min/1.7m2 L FrStHsLb   Comment:  African American GFR Calc   Calcium 8.4 8.5 - 10.1 mg/dL L FrStHsLb            Basic metabolic panel [[id number]] (Abnormal)  Resulted: 17 0921, Result status: Final result    Ordering provider: Skinny Espana MD  17 0001 Resulting lab: [facility]    Specimen Information    Type Source Collected On   Blood  17 0849          Components       Value Reference Range Flag Lab   Sodium 129 133 - 144 mmol/L " L FrStHsLb   Potassium 4.9 3.4 - 5.3 mmol/L  FrStHsLb   Chloride 99 94 - 109 mmol/L  FrStHsLb   Carbon Dioxide 21 20 - 32 mmol/L  FrStHsLb   Anion Gap 9 3 - 14 mmol/L  FrStHsLb   Glucose 122 70 - 99 mg/dL H FrStHsLb   Urea Nitrogen 33 7 - 30 mg/dL H FrStHsLb   Creatinine 1.69 0.52 - 1.04 mg/dL H FrStHsLb   GFR Estimate 28 >60 mL/min/1.7m2 L FrStHsLb   Comment:  Non  GFR Calc   GFR Estimate If Black 34 >60 mL/min/1.7m2 L FrStHsLb   Comment:  African American GFR Calc   Calcium 8.9 8.5 - 10.1 mg/dL  FrStHsLb            T4 free [[id number]]  Resulted: 05/04/17 1209, Result status: Final result    Ordering provider: Skinny Espana MD  05/04/17 0630 Resulting lab: [facility]    Specimen Information    Type Source Collected On     05/04/17 0630          Components       Value Reference Range Flag Lab   T4 Free 1.01 0.76 - 1.46 ng/dL  FrStHsLb            TSH with free T4 reflex [[id number]] (Abnormal)  Resulted: 05/04/17 1156, Result status: Final result    Ordering provider: Skinny Espana MD  05/04/17 0630 Resulting lab: [facility]    Specimen Information    Type Source Collected On     05/04/17 0630          Components       Value Reference Range Flag Lab   TSH 8.58 0.40 - 4.00 mU/L H FrStHsLb            Magnesium [[id number]] (Abnormal)  Resulted: 05/04/17 1114, Result status: Final result    Ordering provider: Skinny Espana MD  05/04/17 0630 Resulting lab: [facility]    Specimen Information    Type Source Collected On     05/04/17 0630          Components       Value Reference Range Flag Lab   Magnesium 2.4 1.6 - 2.3 mg/dL H FrStHsLb            Basic metabolic panel [[id number]] (Abnormal)  Resulted: 05/04/17 0654, Result status: Final result    Ordering provider: Skinny Espana MD  05/04/17 0001 Resulting lab: [facility]    Specimen Information    Type Source Collected On   Blood  05/04/17 0649           Components       Value Reference Range Flag Lab   Sodium 130 133 - 144 mmol/L L FrStHsLb   Potassium 4.7 3.4 - 5.3 mmol/L  FrStHsLb   Chloride 100 94 - 109 mmol/L  FrStHsLb   Carbon Dioxide 22 20 - 32 mmol/L  FrStHsLb   Anion Gap 8 3 - 14 mmol/L  FrStHsLb   Glucose 95 70 - 99 mg/dL  FrStHsLb   Urea Nitrogen 35 7 - 30 mg/dL H FrStHsLb   Creatinine 1.58 0.52 - 1.04 mg/dL H FrStHsLb   GFR Estimate 30 >60 mL/min/1.7m2 L FrStHsLb   Comment:  Non  GFR Calc   GFR Estimate If Black 37 >60 mL/min/1.7m2 L FrStHsLb   Comment:  African American GFR Calc   Calcium 8.4 8.5 - 10.1 mg/dL L FrStHsLb            Sodium (Every 6 Hrs) [[id number]] (Abnormal)  Resulted: 05/04/17 0038, Result status: Final result    Ordering provider: Skinny Espana MD  05/03/17 1800 Resulting lab: [facility]    Specimen Information    Type Source Collected On   Blood  05/04/17 0010          Components       Value Reference Range Flag Lab   Sodium 128 133 - 144 mmol/L L FrStHsLb            Sodium (Every 6 Hrs) [[id number]] (Abnormal)  Resulted: 05/03/17 1840, Result status: Final result    Ordering provider: Skinny Espana MD  05/03/17 1200 Resulting lab: [facility]    Specimen Information    Type Source Collected On   Blood  05/03/17 1805          Components       Value Reference Range Flag Lab   Sodium 128 133 - 144 mmol/L L FrStHsLb            Sodium (Every 6 Hrs) [[id number]] (Abnormal)  Resulted: 05/03/17 1227, Result status: Final result    Ordering provider: Skinny Espana MD  05/03/17 1117 Resulting lab: [facility]    Specimen Information    Type Source Collected On   Blood  05/03/17 1205          Components       Value Reference Range Flag Lab   Sodium 129 133 - 144 mmol/L L FrStHsLb            Basic metabolic panel [[id number]] (Abnormal)  Resulted: 05/03/17 1029, Result status: Final result    Ordering provider: Nick Pruitt, DO   05/03/17 0001 Resulting lab: [facility]    Specimen Information    Type Source Collected On   Blood  05/03/17 0943          Components       Value Reference Range Flag Lab   Sodium 131 133 - 144 mmol/L L FrStHsLb   Potassium 4.6 3.4 - 5.3 mmol/L  FrStHsLb   Chloride 100 94 - 109 mmol/L  FrStHsLb   Carbon Dioxide 23 20 - 32 mmol/L  FrStHsLb   Anion Gap 8 3 - 14 mmol/L  FrStHsLb   Glucose 113 70 - 99 mg/dL H FrStHsLb   Urea Nitrogen 33 7 - 30 mg/dL H FrStHsLb   Creatinine 1.67 0.52 - 1.04 mg/dL H FrStHsLb   GFR Estimate 29 >60 mL/min/1.7m2 L FrStHsLb   Comment:  Non  GFR Calc   GFR Estimate If Black 35 >60 mL/min/1.7m2 L FrStHsLb   Comment:  African American GFR Calc   Calcium 8.6 8.5 - 10.1 mg/dL  FrStHsLb            CBC with platelets [[id number]] (Abnormal)  Resulted: 05/03/17 1013, Result status: Final result    Ordering provider: Nick Pruitt DO  05/03/17 0001 Resulting lab: [facility]    Specimen Information    Type Source Collected On   Blood  05/03/17 0943          Components       Value Reference Range Flag Lab   WBC 6.9 4.0 - 11.0 10e9/L  FrStHsLb   RBC Count 2.85 3.8 - 5.2 10e12/L L FrStHsLb   Hemoglobin 8.9 11.7 - 15.7 g/dL L FrStHsLb   Hematocrit 26.5 35.0 - 47.0 % L FrStHsLb   MCV 93 78 - 100 fl  FrStHsLb   MCH 31.2 26.5 - 33.0 pg  FrStHsLb   MCHC 33.6 31.5 - 36.5 g/dL  FrStHsLb   RDW 13.7 10.0 - 15.0 %  FrStHsLb   Platelet Count 396 150 - 450 10e9/L  FrStHsLb            Osmolality [[id number]] (Abnormal)  Resulted: 05/03/17 0052, Result status: Final result    Ordering provider: Nick Pruitt DO  05/02/17 2136 Resulting lab: [facility]    Specimen Information    Type Source Collected On   Blood  05/03/17 0030          Components       Value Reference Range Flag Lab   Osmolality 271 280 - 301 mmol/kg L FrStHsLb            Sodium [[id number]] (Abnormal)  Resulted: 05/03/17 0047, Result status: Final result    Ordering  provider: Nick Pruitt,   05/02/17 2136 Resulting lab: [facility]    Specimen Information    Type Source Collected On   Blood  05/03/17 0030          Components       Value Reference Range Flag Lab   Sodium 127 133 - 144 mmol/L L FrStHsLb            Testing Performed By     Lab - Abbreviation Name Director Address Valid Date Range    14 - FrStHsLb [facility] Unknown [address] [place] [address] 05/08/15 1057 - Present            Unresulted Labs     None      Encounter-Level Documents:     There are no encounter-level documents.      Order-Level Documents:     There are no order-level documents.

## 2017-05-02 NOTE — IP AVS SNAPSHOT
MRN:[id number]                      After Visit Summary   5/2/2017    Milagros Marie    MRN: [id number]           Thank you!     Thank you for choosing [facility] for your care. Our goal is always to provide you with excellent care. Hearing back from our patients is one way we can continue to improve our services. Please take a few minutes to complete the written survey that you may receive in the mail after you visit with us. Thank you!        Patient Information     Date Of Birth          9/17/1923        Designated Caregiver       Most Recent Value    Caregiver    Will someone help with your care after discharge? yes    Name of designated caregiver Sheeba Schmitz, nurse manager at care facility    Phone number of caregiver [phone]    Caregiver [address]      About your hospital stay     You were admitted on:  May 2, 2017 You last received care in the:  [place] [address] [facility]    You were discharged on:  May 6, 2017        Reason for your hospital stay       You presented after a fall and found to have low sodium levels. This was treated fluid restriction with improvement.                  Who to Call     For medical emergencies, please call 911.  For non-urgent questions about your medical care, please call your primary care provider or clinic, [phone]          Attending Provider     Provider Specialty    Angeli Mark, CNP --    Nick Pruitt, DO Internal Medicine       Primary Care Provider Office Phone # Fax #    Maria Eugenia Jade Holley [phone]       [facility] [address]  [facility] [address]        After Care Instructions     Activity - Up with nursing assistance           Advance Diet as Tolerated       Follow this diet upon discharge:       Fluid restriction 1500 ML FLUID      Combination Diet Regular Diet Adult            Daily weights       Call Provider for weight gain of more than  "2 pounds per day or 5 pounds per week.            Fall precautions           General info for SNF       Length of Stay Estimate: Short Term Care: Estimated # of Days <30  Condition at Discharge: Improving  Level of care:skilled   Rehabilitation Potential: Good  Admission H&P remains valid and up-to-date: Yes  Recent Chemotherapy: N/A  Use Nursing Home Standing Orders: Yes            Intake and output       Every shift            Mantoux instructions       Give two-step Mantoux (PPD) Per Facility Policy Yes                  Follow-up Appointments     Follow Up and recommended labs and tests       Follow up with [facility] physician.  The following labs/tests are recommended: Will need to occasionally monitor a BMP to monitor her Na and Cr levels.                  Additional Services     Occupational Therapy Adult Consult       Evaluate and treat as clinically indicated.    Reason:  Deconditioning / Falls            Physical Therapy Adult Consult       Evaluate and treat as clinically indicated.    Reason:  Deconditioning / Falls                  Pending Results     No orders found from 4/30/2017 to 5/3/2017.            Statement of Approval     Ordered          05/06/17 0803  I have reviewed and agree with all the recommendations and orders detailed in this document.  EFFECTIVE NOW     Approved and electronically signed by:  Skinny Espana MD             Admission Information     Date & Time Provider Department Dept. Phone    5/2/2017 Nick Pruitt DO [place] [address] [facility] [phone]      Your Vitals Were     Blood Pressure Pulse Temperature Respirations Height Weight    155/72 82 98.3  F (36.8  C) 16 1.6 m (5' 3\") 61.1 kg (134 lb 11.2 oz)    Pulse Oximetry BMI (Body Mass Index)                95% 23.86 kg/m2          MyChart Information     [employer] lets you send messages to your doctor, view your test results, renew your prescriptions, schedule appointments and more. To " "sign up, go to [email or web address] . Click on \"Log in\" on the left side of the screen, which will take you to the Welcome page. Then click on \"Sign up Now\" on the right side of the page.     You will be asked to enter the access code listed below, as well as some personal information. Please follow the directions to create your username and password.     Your access code is: [email or web address]  Expires: 2017  7:44 PM     Your access code will  in 90 days. If you need help or a new code, please call your [facility] clinic or [phone].        Care EveryWhere ID     This is your Care EveryWhere ID. This could be used by other organizations to access your [facility] medical records  [id number]           Review of your medicines      CONTINUE these medicines which may have CHANGED, or have new prescriptions. If we are uncertain of the size of tablets/capsules you have at home, strength may be listed as something that might have changed.        Dose / Directions    Magnesium 500 MG Caps   Indication:  Low Amount of Magnesium in the Blood   This may have changed:  when to take this   Used for:  Other fatigue        Dose:  1 tablet   Take 1 tablet by mouth every other day Daughter does not know salt form   Quantity:  30 capsule   Refills:  3         CONTINUE these medicines which have NOT CHANGED        Dose / Directions    ACETAMINOPHEN EXTRA STRENGTH 500 MG tablet   Generic drug:  acetaminophen        Dose:  500 mg   Take 500 mg by mouth 3 times daily   Refills:  0       ADVANCED PROBIOTIC Caps        Dose:  1 tablet   Take 1 tablet by mouth daily (with lunch)   Refills:  0       ALLEGRA PO        Dose:  180 mg   Take 180 mg by mouth daily.   Refills:  0       AMLODIPINE BESYLATE PO        Dose:  10 mg   Take 10 mg by mouth daily   Refills:  0       CALCIUM 600 + D PO        Dose:  1 tablet   Take 1 tablet by mouth daily   Refills:  0       cephALEXin 500 MG capsule   Commonly known as:  KEFLEX        Dose:  " 2000 mg   Take 2,000 mg by mouth as needed Administer one hour prior to dental procedure   Refills:  99       COMBIGAN 0.2-0.5 % ophthalmic solution   Generic drug:  brimonidine-timolol        Dose:  1 drop   Place 1 drop Into the left eye 2 times daily   Refills:  4       dorzolamide 2 % ophthalmic solution   Commonly known as:  TRUSOPT        Dose:  1 drop   Place 1 drop Into the left eye 2 times daily   Refills:  0       ferrous gluconate 324 (38 FE) MG tablet   Commonly known as:  FERGON   Used for:  Upper GI bleed        Dose:  324 mg   Take 1 tablet (324 mg) by mouth 2 times daily   Quantity:  100 tablet   Refills:  1       irbesartan 300 MG tablet   Commonly known as:  AVAPRO   Used for:  Essential hypertension        Dose:  150 mg   Take 0.5 tablets (150 mg) by mouth 2 times daily   Quantity:  30 tablet   Refills:  0       levothyroxine 50 MCG tablet   Commonly known as:  SYNTHROID/LEVOTHROID   Used for:  Hypothyroidism due to acquired atrophy of thyroid        TAKE 1 TABLET (50 MCG) BY MOUTH DAILY   Quantity:  90 tablet   Refills:  2       metoprolol 50 MG tablet   Commonly known as:  LOPRESSOR   Used for:  Encounter for medication refill        TAKE 1 AND 1/2 TABLET TWICE DAILY WITH FOOD OR MILK   Quantity:  270 tablet   Refills:  3       pantoprazole 40 MG EC tablet   Commonly known as:  PROTONIX   Used for:  Upper GI bleed        Take one tab orally twice a day for 8 weeks, then daily indefinitely,  Take on empty stomach about half an hour before eating.   Quantity:  60 tablet   Refills:  2       TRAVATAN 0.004 % ophthalmic solution   Generic drug:  travoprost Z (benzalkonium)        Dose:  1 drop   1 drop. One drop in left eye at bedtime.   Refills:  0         STOP taking     HYDROcodone-acetaminophen 5-325 MG per tablet   Commonly known as:  NORCO                Where to get your medicines      Some of these will need a paper prescription and others can be bought over the counter. Ask your nurse if you  have questions.     You don't need a prescription for these medications     Magnesium 500 MG Caps                Protect others around you: Learn how to safely use, store and throw away your medicines at www.disposemymeds.org.             Medication List: This is a list of all your medications and when to take them. Check marks below indicate your daily home schedule. Keep this list as a reference.      Medications           Morning Afternoon Evening Bedtime As Needed    ACETAMINOPHEN EXTRA STRENGTH 500 MG tablet   Take 500 mg by mouth 3 times daily   Last time this was given:  500 mg on 5/6/2017  8:00 AM   Generic drug:  acetaminophen                                ADVANCED PROBIOTIC Caps   Take 1 tablet by mouth daily (with lunch)                                ALLEGRA PO   Take 180 mg by mouth daily.                                AMLODIPINE BESYLATE PO   Take 10 mg by mouth daily   Last time this was given:  10 mg on 5/6/2017  8:00 AM                                CALCIUM 600 + D PO   Take 1 tablet by mouth daily                                cephALEXin 500 MG capsule   Commonly known as:  KEFLEX   Take 2,000 mg by mouth as needed Administer one hour prior to dental procedure                                COMBIGAN 0.2-0.5 % ophthalmic solution   Place 1 drop Into the left eye 2 times daily   Last time this was given:  1 drop on 5/6/2017  8:03 AM   Generic drug:  brimonidine-timolol                                dorzolamide 2 % ophthalmic solution   Commonly known as:  TRUSOPT   Place 1 drop Into the left eye 2 times daily   Last time this was given:  1 drop on 5/6/2017  7:57 AM                                ferrous gluconate 324 (38 FE) MG tablet   Commonly known as:  FERGON   Take 1 tablet (324 mg) by mouth 2 times daily                                irbesartan 300 MG tablet   Commonly known as:  AVAPRO   Take 0.5 tablets (150 mg) by mouth 2 times daily   Last time this was given:  150 mg on 5/6/2017   8:00 AM                                levothyroxine 50 MCG tablet   Commonly known as:  SYNTHROID/LEVOTHROID   TAKE 1 TABLET (50 MCG) BY MOUTH DAILY                                Magnesium 500 MG Caps   Take 1 tablet by mouth every other day Daughter does not know salt form                                metoprolol 50 MG tablet   Commonly known as:  LOPRESSOR   TAKE 1 AND 1/2 TABLET TWICE DAILY WITH FOOD OR MILK   Last time this was given:  75 mg on 5/6/2017  8:00 AM                                pantoprazole 40 MG EC tablet   Commonly known as:  PROTONIX   Take one tab orally twice a day for 8 weeks, then daily indefinitely,  Take on empty stomach about half an hour before eating.   Last time this was given:  40 mg on 5/6/2017  8:00 AM                                TRAVATAN 0.004 % ophthalmic solution   1 drop. One drop in left eye at bedtime.   Last time this was given:  1 drop on 5/5/2017  9:13 PM   Generic drug:  travoprost Z (benzalkonium)

## 2017-05-02 NOTE — IP AVS SNAPSHOT
` `     [place] [address] [facility]: [phone]            Medication Administration Report for Milagros Marie as of 05/06/17 1210   Legend:    Given Hold Not Given Due Canceled Entry Other Actions    Time Time (Time) Time  Time-Action       Inactive    Active    Linked        Medications 04/30/17 05/01/17 05/02/17 05/03/17 05/04/17 05/05/17 05/06/17    acetaminophen (TYLENOL) Suppository 650 mg  Dose: 650 mg Freq: EVERY 4 HOURS PRN Route: RE  PRN Reason: mild pain  Start: 05/02/17 2136   Admin Instructions: Alternate ibuprofen (if ordered) with acetaminophen.  Maximum acetaminophen dose from all sources = 75 mg/kg/day not to exceed 4 grams/day.               acetaminophen (TYLENOL) tablet 500 mg  Dose: 500 mg Freq: 3 TIMES DAILY Route: PO  Start: 05/02/17 2200   Admin Instructions: Maximum acetaminophen dose from all sources = 75 mg/kg/day not to exceed 4 gram       2245 (500 mg)-Given        0932 (500 mg)-Given       1611 (500 mg)-Given       2155 (500 mg)-Given        0839 (500 mg)-Given       1656 (500 mg)-Given       2111 (500 mg)-Given        0837 (500 mg)-Given       1648 (500 mg)-Given       2023 (500 mg)-Given               0800 (500 mg)-Given       [ ] 1600       [ ] 2200           acetaminophen (TYLENOL) tablet 650 mg  Dose: 650 mg Freq: EVERY 4 HOURS PRN Route: PO  PRN Reason: mild pain  Start: 05/02/17 2136   Admin Instructions: Alternate ibuprofen (if ordered) with acetaminophen.  Maximum acetaminophen dose from all sources = 75 mg/kg/day not to exceed 4 grams/day.         (0837)-Not Given       (0840)-Not Given             amLODIPine (NORVASC) tablet 10 mg  Dose: 10 mg Freq: DAILY Route: PO  Start: 05/03/17 0900   Admin Instructions: Hold for SBP < 110        0932 (10 mg)-Given        0839 (10 mg)-Given        0837 (10 mg)-Given        0800 (10 mg)-Given           bisacodyl (DULCOLAX) Suppository 10 mg  Dose: 10 mg Freq: DAILY PRN Route: RE  PRN Reason: constipation  Start:  05/02/17 2136   Admin Instructions: Hold for loose stools.  This is the third step of a three step constipation treatment protocol.               brimonidine-timolol (COMBIGAN) 0.2-0.5 % ophthalmic solution 1 drop  Dose: 1 drop Freq: 2 TIMES DAILY Route: LEFT EYE  Start: 05/02/17 2200       0006 (1 drop)-Given       0836 (1 drop)-Given       2053 (1 drop)-Given        0841 (1 drop)-Given       2046 (1 drop)-Given        0846 (1 drop)-Given       2007 (1 drop)-Given        0803 (1 drop)-Given       [ ] 2100           dorzolamide (TRUSOPT) 2 % ophthalmic solution 1 drop  Dose: 1 drop Freq: 2 TIMES DAILY Route: LEFT EYE  Start: 05/02/17 2200       0005 (1 drop)-Given       0939 (1 drop)-Given       2102 (1 drop)-Given        0841 (1 drop)-Given       2057 (1 drop)-Given        0846 (1 drop)-Given       2022 (1 drop)-Given        0757 (1 drop)-Given              [ ] 2100           HYDROcodone-acetaminophen (NORCO) 5-325 MG per tablet 0.5-1 tablet  Dose: 0.5-1 tablet Freq: EVERY 4 HOURS PRN Route: PO  PRN Reason: moderate to severe pain  Start: 05/02/17 2136   Admin Instructions: Maximum acetaminophen dose from all sources= 75 mg/kg/day not to exceed 4 grams               irbesartan (AVAPRO) tablet 150 mg  Dose: 150 mg Freq: 2 TIMES DAILY. Route: PO  Start: 05/03/17 0800       0932 (150 mg)-Given       1612 (150 mg)-Given        0839 (150 mg)-Given       1656 (150 mg)-Given        0837 (150 mg)-Given       1648 (150 mg)-Given        0800 (150 mg)-Given       [ ] 1600           metoprolol (LOPRESSOR) tablet 75 mg  Dose: 75 mg Freq: 2 TIMES DAILY Route: PO  Start: 05/02/17 2200   Admin Instructions: Hold for SBP < 100 or HR <60       2245 (75 mg)-Given        0932 (75 mg)-Given       2050 (75 mg)-Given        0838 (75 mg)-Given       2056 (75 mg)-Given        0837 (75 mg)-Given       2022 (75 mg)-Given        0800 (75 mg)-Given       [ ] 2100           naloxone (NARCAN) injection 0.1-0.4 mg  Dose: 0.1-0.4 mg Freq: EVERY 2  MIN PRN Route: IV  PRN Reason: opioid reversal  Start: 05/02/17 2136   Admin Instructions: For respiratory rate LESS than or EQUAL to 8.  Partial reversal dose:  0.1 mg titrated q 2 minutes for Analgesia Side Effects Monitoring Sedation Level of 3 (frequently drowsy, arousable, drifts to sleep during conversation).Full reversal dose:  0.4 mg bolus for Analgesia Side Effects Monitoring Sedation Level of 4 (somnolent, minimal or no response to stimulation).               ondansetron (ZOFRAN-ODT) ODT tab 4 mg  Dose: 4 mg Freq: EVERY 6 HOURS PRN Route: PO  PRN Reason: nausea  Start: 05/02/17 2136   Admin Instructions: This is Step 1 of nausea and vomiting management.  If nausea not resolved in 15 minutes, go to Step 2 prochlorperazine (COMPAZINE). Do not push through foil backing. Peel back foil and gently remove. Place on tongue immediately. Administration with liquid unnecessary              Or  ondansetron (ZOFRAN) injection 4 mg  Dose: 4 mg Freq: EVERY 6 HOURS PRN Route: IV  PRN Reasons: nausea,vomiting  Start: 05/02/17 2136   Admin Instructions: This is Step 1 of nausea and vomiting management.  If nausea not resolved in 15 minutes, go to Step 2 prochlorperazine (COMPAZINE).  Irritant.               pantoprazole (PROTONIX) EC tablet 40 mg  Dose: 40 mg Freq: 2 TIMES DAILY BEFORE MEALS Route: PO  Start: 05/03/17 0730   Admin Instructions: DO NOT CRUSH.        0644 (40 mg)-Given       1612 (40 mg)-Given        0641 (40 mg)-Given       1656 (40 mg)-Given        0645 (40 mg)-Given       1648 (40 mg)-Given        0800 (40 mg)-Given       [ ] 1630           prochlorperazine (COMPAZINE) injection 5 mg  Dose: 5 mg Freq: EVERY 6 HOURS PRN Route: IV  PRN Reasons: nausea,vomiting  Start: 05/02/17 2136   Admin Instructions: This is Step 2 of nausea and vomiting management.   If nausea not resolved in 15 minutes, give metoclopramide (REGLAN) if ordered (step 3 of nausea and vomiting management)              Or  prochlorperazine  (COMPAZINE) tablet 5 mg  Dose: 5 mg Freq: EVERY 6 HOURS PRN Route: PO  PRN Reason: vomiting  Start: 05/02/17 2136   Admin Instructions: This is Step 2 of nausea and vomiting management.   If nausea not resolved in 15 minutes, give metoclopramide (REGLAN) if ordered (step 3 of nausea and vomiting management)              Or  prochlorperazine (COMPAZINE) Suppository 12.5 mg  Dose: 12.5 mg Freq: EVERY 12 HOURS PRN Route: RE  PRN Reasons: nausea,vomiting  Start: 05/02/17 2136   Admin Instructions: This is Step 2 of nausea and vomiting management.   If nausea not resolved in 15 minutes, give metoclopramide (REGLAN) if ordered (step 3 of nausea and vomiting management)               senna-docusate (SENOKOT-S;PERICOLACE) 8.6-50 MG per tablet 1-2 tablet  Dose: 1-2 tablet Freq: 2 TIMES DAILY PRN Route: PO  PRN Comment: constipation   Start: 05/02/17 2136   Admin Instructions: If no bowel movement in 24 hours, increase to 2 tablets PO BID.  Hold for loose stools.   This is the first step of a three step constipation treatment protocol.               travoprost (MARY Free) (TRAVATAN Z) 0.004 % ophthalmic solution 1 drop  Dose: 1 drop Freq: AT BEDTIME Route: LEFT EYE  Start: 05/02/17 2200       0006 (1 drop)-Given       2155 (1 drop)-Given        2111 (1 drop)-Given        2113 (1 drop)-Given        [ ] 2200          Discontinued Medications  Medications 04/30/17 05/01/17 05/02/17 05/03/17 05/04/17 05/05/17 05/06/17         Rate: 75 mL/hr Freq: CONTINUOUS Route: IV  Last Dose: Stopped (05/04/17 1438)  Start: 05/02/17 2145   End: 05/04/17 1203      2246 ( )-New Bag        0644 ( )-New Bag       1123 ( )-Rate/Dose Change        0249 ( )-New Bag       1203-Med Discontinued  1438-Stopped               Dose: 30 mL Freq: DAILY PRN Route: PO  PRN Reason: constipation  Start: 05/02/17 2136   End: 05/04/17 1139   Admin Instructions: Hold for loose stools.  This is the second step of a three step constipation treatment protocol.            1139-Med Discontinued

## 2017-05-02 NOTE — IP AVS SNAPSHOT
` `     [place] [address] [facility]: [phone]                 INTERAGENCY TRANSFER FORM - NOTES (H&P, Discharge Summary, Consults, Procedures, Therapies)   2017                    Hospital Admission Date: 2017  CHELLY RENAE   : 1923  Sex: Female        Patient PCP Information     Provider PCP Type    Maria Eugenia Holley MD General      History & Physicals     No notes of this type exist for this encounter.      Discharge Summaries     No notes of this type exist for this encounter.      Consult Notes     No notes of this type exist for this encounter.         Progress Notes - Physician (Notes from 17 through 17)      Progress Notes by Pooja Shepherd MSW at 2017  9:51 AM     Author:  Pooja Shepherd MSW Service:  (none) Author Type:      Filed:  2017 10:01 AM Date of Service:  2017  9:51 AM Note Created:  2017  9:51 AM    Status:  Addendum :  Pooja Shepherd MSW ()         SW:    D/I: Pt is ready for DC today to [facility][JE1.1] LTC[JE1.2]. Call placed to [facility] and alerted them that the pt will be DC'ed today. Transport arranged at 12:30 via  WC. Orders faxed to [phone]. Pt and[JE1.1] daughter Chantell[JE1.3] agrees with DC plan.    A: Pt is alert and ox3    P: DC to [facility] at 12:30 via .    CHRISTIANO Hayes, [facility]  Phone [phone][JE1.1]       Revision History        User Key Date/Time User Provider Type Action    > JE1.3 2017 10:01 AM Pooja Shepherd MSW  Addend     JE1.2 2017  9:57 AM Pooja Shepherd MSW  Addend     JE1.1 2017  9:55 AM Pooja Shepherd MSW  Sign            Progress Notes by Pooja Puga LSW at 2017  2:41 PM     Author:  Pooja Puga LSW Service:  Social Work Author Type:      Filed:  2017  2:45  PM Date of Service:  5/5/2017  2:41 PM Note Created:  5/5/2017  2:41 PM    Status:  Addendum :  Pooja Puga LSW ()         D: CARLO following for DC planning. Anticipate DC Saturday.   I: CARLO updated Stephany at [facility]. Saturday SW should call [phone] when pt is ready. HE WC ride set for 1330.  P: SW will follow.     KLAUS Darnell  [id number][JJ1.1]    PAS-RR    D: Per DHS regulation, SW completed and submitted PAS-RR to [place] [employer] Direct Connect via the Senior LinkAge Line.  PAS-RR confirmation # is : [id number]    I: SW spoke with dtr and they are aware a PAS-RR has been submitted.  SW reviewed with dtr that they may be contacted for a follow up appointment within 10 days of hospital discharge if their SNF stay is < 30 days.  Contact information for Senior LinkAge Line was also provided.    A: Dtr verbalized understanding.    P: Further questions may be directed to [facility] LinkAge Line at #[phone], option #4 for PAS-RR staff.[JJ1.2]       Revision History        User Key Date/Time User Provider Type Action    > JJ1.2 5/5/2017  2:45 PM Pooja Puga LSW  Addend     JJ1.1 5/5/2017  2:43 PM Pooja Puga LSW  Sign            Progress Notes by Skinny Espana MD at 5/5/2017 11:40 AM     Author:  Skinny Espana MD Service:  Hospitalist Author Type:  Physician    Filed:  5/5/2017 11:44 AM Date of Service:  5/5/2017 11:40 AM Note Created:  5/5/2017 11:40 AM    Status:  Signed :  Skinny Espana MD (Physician)         [facility]    Hospitalist Progress Note    Date of Service (when I saw the patient): 05/05/2017    Assessment & Plan   Milagros Marie is a 89 year old female with a past medical history significant for chronic kidney disease stage IV, hypertension, GERD and dementia who presents to the Emergency Department with hyponatremia, leg  discomfort and weakness following a fall.      Hyponatremia  Na 125 on admit. Na on 3/30 was 132. Unsure of the present duration. Suspected this was due to hypovolemia on admit, though I do note she has some pain, so cannot completely rule out SIADH at this point. Has had Na levels as low as 125 in the past and felt to be related to her drinking too much water per clinic notes.    - Treated with NS 75 mL per hour. Na improved to 131 HD # 1.   - Na 130 on 5/4.  IVF stopped.   - Urine osmolality is low normal at 255 and serum osmol is low at 271 and not consistent with clear SIADH.   - Na 129 5/5.  Placed on 1500 ml fluid restriction as po intake not appropriately documented previously.   - Follow Na in am.       Left leg discomfort following a fall   Deconditioning.   No evidence of fracture. Pain control as needed with judicious use of narcotics.   - PT/OT     Chronic kidney disease stage IV.   Creatinine baseline seems to be around 1.5-1.8. Cr is 1.9 on admit.   - Cr 1.69 today.   - BMP in am.      Hypertension.   Will continue with her PTA regimen.      Hypothyroidism.   Will continue with Synthroid.   - TSH 8.58 but FT4 normal.    - Will need to repeat TFT's in 6-8 weeks with pcp.      DVT Prophylaxis: Pneumatic Compression Devices  Code Status: DNR/DNI     Disposition: Follow Na in am. Fluid restrict to 1500 ml per day. Possibly another day. Will need TCU.       Skinny Espana       Interval History   No acute events overnight.  Vitals stable. Na 129 today.  Fluid restriction placed today.     -Data reviewed today: I reviewed all new labs and imaging results over the last 24 hours. I personally reviewed no images or EKG's today.    Physical Exam   Temp: 98.3  F (36.8  C) Temp src: Oral BP: 166/77 Pulse: 71 Heart Rate: 67 Resp: 16 SpO2: 97 % O2 Device: None (Room air)    Vitals:    05/05/17 0700   Weight: 61.2 kg (134 lb 14.7 oz)     Vital Signs with Ranges  Temp:  [97.2  F (36.2  C)-98.5  F (36.9  C)]  98.3  F (36.8  C)  Pulse:  [71] 71  Heart Rate:  [65-80] 67  Resp:  [16] 16  BP: (132-166)/(55-77) 166/77  SpO2:  [96 %-97 %] 97 %  I/O last 3 completed shifts:  In: 240 [P.O.:240]  Out: 1200 [Urine:1200]    Gen: Patient in no acute distress.  Appears comfortable.  Heart:  S1S2+, regular rate and rhythm, No murmurs.  Lungs:  Clear to auscultation at apices, no wheezing, faint rales at left base.    Abdomen:  Soft, non tender, non distended, bowel sounds positive.  Extremities: Trace LE edema.    Medications        acetaminophen  500 mg Oral TID     amLODIPine (NORVASC) tablet 10 mg  10 mg Oral Daily     brimonidine-timolol  1 drop Left Eye BID     dorzolamide  1 drop Left Eye BID     irbesartan  150 mg Oral BID     metoprolol  75 mg Oral BID     pantoprazole  40 mg Oral BID AC     travoprost (MARY Free)  1 drop Left Eye At Bedtime       Data     Recent Labs  Lab 05/05/17  0849 05/04/17  0630 05/04/17  0010  05/03/17  0943  05/02/17  1820   WBC  --   --   --   --  6.9  --  7.9   HGB  --   --   --   --  8.9*  --  9.8*   MCV  --   --   --   --  93  --  91   PLT  --   --   --   --  396  --  405   * 130* 128*  < > 131*  < > 125*   POTASSIUM 4.9 4.7  --   --  4.6  --  5.3   CHLORIDE 99 100  --   --  100  --  94   CO2 21 22  --   --  23  --  23   BUN 33* 35*  --   --  33*  --  39*   CR 1.69* 1.58*  --   --  1.67*  --  1.90*   ANIONGAP 9 8  --   --  8  --  8   SAMSON 8.9 8.4*  --   --  8.6  --  8.9   * 95  --   --  113*  --  102*   ALBUMIN  --   --   --   --   --   --  3.6   PROTTOTAL  --   --   --   --   --   --  7.5   BILITOTAL  --   --   --   --   --   --  0.4   ALKPHOS  --   --   --   --   --   --  147   ALT  --   --   --   --   --   --  23   AST  --   --   --   --   --   --  19   < > = values in this interval not displayed.    No results found for this or any previous visit (from the past 24 hour(s)).[JR1.1]     Revision History        User Key Date/Time User Provider Type Action    > JR1.1 5/5/2017 11:44 AM  Skinny Espana MD Physician Sign            Progress Notes by Pooja Puga LSW at 5/4/2017 12:17 PM     Author:  Pooja Puga LSW Service:  Social Work Author Type:      Filed:  5/4/2017  2:56 PM Date of Service:  5/4/2017 12:17 PM Note Created:  5/4/2017 12:17 PM    Status:  Addendum :  Pooja Puga LSW ()         D: SW following for DC planning.   I: Pt has been accepted at [facility] TCU.  P: SW will follow.     KLAUS Darnell  [id number][JJ1.1]    Addendum: Spoke with pt and dtr Chantell about the referral to [facility] TCU. She is agreeable. Chantell had concerns about her mother paying for both rooms at [facility]. Explained 3-day stay requirement and Medicare coverage for TCU. Anticipate DC Friday which will be pts third day. Chantell requests transportation. SW explained a WC ride is private pay.[JJ1.2]     Revision History        User Key Date/Time User Provider Type Action    > JJ1.2 5/4/2017  2:56 PM Pooja Puga LSW  Addend     JJ1.1 5/4/2017 12:17 PM Pooja Puga LSW  Sign            Progress Notes by Skinny Espana MD at 5/4/2017 11:25 AM     Author:  Skinny Espana MD Service:  Hospitalist Author Type:  Physician    Filed:  5/4/2017  2:46 PM Date of Service:  5/4/2017 11:25 AM Note Created:  5/4/2017 11:25 AM    Status:  Signed :  Skinny Espana MD (Physician)         [facility]    Hospitalist Progress Note    Date of Service (when I saw the patient): 05/04/2017    Assessment & Plan   Milagros Marie is a 89 year old female with a past medical history significant for chronic kidney disease stage IV, hypertension, GERD and dementia who presents to the Emergency Department with hyponatremia, leg discomfort and weakness following a fall.      Hyponatremia  Na 125 on admit. Na on 3/30 was 132.  Unsure of the present duration. Suspected this was due to hypovolemia on admit, though I do note she has some pain, so cannot completely rule out SIADH at this point.   - Treated with NS 75 mL per hour. Na improved to 131 HD # 1 and fluids reduced to 40 ml /hr.    - Na 130 on 5/4.[JR1.1]  IVF stopped.[JR1.2]   - Urine osmolality, serum osmolality is low normal at 255 and serum osmol is low at 271 and not consistent with clear SIADH.   - Follow N[JR1.1]a in am.[JR1.2]        Left leg discomfort following a fall   Deconditioning.   No evidence of fracture. Pain control as needed with judicious use of narcotics.   - PT/OT     Chronic kidney disease stage IV.   Creatinine baseline seems to be around 1.5-1.8. Cr is 1.9 on admit.   - Has improved with IVF and is 1.58 today.   - IVF[JR1.1] stopped.[JR1.2]       Hypertension.   Will continue with her PTA regimen.      Hypothyroidism.   Will continue with Synthroid.   - TSH[JR1.1] 8.58 but FT4 normal.    - Will need to repeat TFT's in 6-8 weeks with pcp.[JR1.2]      DVT Prophylaxis: Pneumatic Compression Devices  Code Status: DNR/DNI     Disposition:[JR1.1] Stop IVF and follow Na in am.  Will plan on d/c tomorrow to TCU if Na stable.  D/w her daughter at bedside today.[JR1.2]       Skinny Espana       Interval History[JR1.1]   No acute events overnight.  Vitals stable. Na 130 today and pt feels well.[JR1.2]     -Data reviewed today: I reviewed all new labs and imaging results over the last 24 hours. I personally reviewed no images or EKG's today.    Physical Exam   Temp: 97.6  F (36.4  C) Temp src: Oral BP: 142/73 Pulse: 71 Heart Rate: 75 Resp: 16 SpO2: 93 % O2 Device: None (Room air)    There were no vitals filed for this visit.  Vital Signs with Ranges  Temp:  [97.6  F (36.4  C)-98.2  F (36.8  C)] 97.6  F (36.4  C)  Pulse:  [70-71] 71  Heart Rate:  [66-75] 75  Resp:  [16] 16  BP: (135-147)/(66-73) 142/73  SpO2:  [93 %-94 %] 93 %  I/O last 3 completed shifts:  In:  2405 [P.O.:1020; I.V.:1385]  Out: 1025 [Urine:1025]    Gen: Patient in no acute distress.  Appears comfortable.  Heart:  S1S2+, regular rate and rhythm, No murmurs.  Lungs:  Clear to auscultation[JR1.1] at apices[JR1.2], no wheezing,[JR1.1] faint rales at left base.[JR1.2]    Abdomen:  Soft, non tender, non distended, bowel sounds positive.  Extremities:[JR1.1] Trace LE[JR1.2] edema.    Medications     NaCl 40 mL/hr at 05/04/17 0249       acetaminophen  500 mg Oral TID     amLODIPine (NORVASC) tablet 10 mg  10 mg Oral Daily     brimonidine-timolol  1 drop Left Eye BID     dorzolamide  1 drop Left Eye BID     irbesartan  150 mg Oral BID     metoprolol  75 mg Oral BID     pantoprazole  40 mg Oral BID AC     travoprost (MARY Free)  1 drop Left Eye At Bedtime       Data     Recent Labs  Lab 05/04/17  0630 05/04/17  0010 05/03/17  1805  05/03/17  0943  05/02/17  1820   WBC  --   --   --   --  6.9  --  7.9   HGB  --   --   --   --  8.9*  --  9.8*   MCV  --   --   --   --  93  --  91   PLT  --   --   --   --  396  --  405   * 128* 128*  < > 131*  < > 125*   POTASSIUM 4.7  --   --   --  4.6  --  5.3   CHLORIDE 100  --   --   --  100  --  94   CO2 22  --   --   --  23  --  23   BUN 35*  --   --   --  33*  --  39*   CR 1.58*  --   --   --  1.67*  --  1.90*   ANIONGAP 8  --   --   --  8  --  8   SAMSON 8.4*  --   --   --  8.6  --  8.9   GLC 95  --   --   --  113*  --  102*   ALBUMIN  --   --   --   --   --   --  3.6   PROTTOTAL  --   --   --   --   --   --  7.5   BILITOTAL  --   --   --   --   --   --  0.4   ALKPHOS  --   --   --   --   --   --  147   ALT  --   --   --   --   --   --  23   AST  --   --   --   --   --   --  19   < > = values in this interval not displayed.    No results found for this or any previous visit (from the past 24 hour(s)).[JR1.1]     Revision History        User Key Date/Time User Provider Type Action    > JR1.2 5/4/2017  2:46 PM Skinny Espana MD Physician Sign     JR1.1 5/4/2017 11:25  Skinny Umaña MD Physician             Progress Notes by Javier Bertrand at 5/3/2017  3:12 PM     Author:  Javier Bertrand Service:  Social Work Author Type:      Filed:  5/3/2017  4:08 PM Date of Service:  5/3/2017  3:12 PM Note Created:  5/3/2017  3:12 PM    Status:  Addendum :  Javier Bertrand ()         Care Transition Initial Assessment -   Reason For Consult: discharge planning  Met with: Patient and daughter, Chantell  Active Problems:    Hyponatremia       DATA  Lives With: facility resident  Living Arrangements: [facility]     Identified issues/concerns regarding health management: Patient is a 89 year old female admitted with Hyponatremia from [facility]. Her anticipated D/C date is 5/4. Chantell montgomery patient is on a LTC unit at [facility] and her payment status is MA pending. Her plan is for patient to return to the facility at D/C and she would like a wheelchair ride arranged. Left a message for Radha at [phone] and CARLO Whitley (per PT note) at [phone] to confirm the bed hold.     Quality Of Family Relationships: supportive, involved  Transportation Available: family or friend will provide    ASSESSMENT  Cognitive Status: Oriented to self only, per RN notes  Concerns to be addressed: D/C planning     PLAN  Financial costs for the patient includes: Potential bill for transport. Explained MA typically covers this, but a bill may be sent before MA is approved.  Patient given options and choices for discharge: Yes  Patient/family is agreeable to the plan?  Yes  Patient Goals and Preferences: Back to [facility]  Patient anticipates discharging to: Back to [facility]    SW is following to coordinate the discharge[RH1.1]    Addendum  Received a return call from Stephany at [facility] and she reports patient was a resident in their Board and Care. Noted patient may have a skilled need upon her return. Her Inpatient date is[RH1.2] 5[RH1.3]/2, so would not have Medicare  coverage if ready for D/C before 5/5. Stephany asked for a referral to be sent. This was done via the DOD process.[RH1.2]     Revision History        User Key Date/Time User Provider Type Action    > RH1.3 5/3/2017  4:08 PM Javier Bertrand  Addend     RH1.2 5/3/2017  4:00 PM Javier Bertrand  Addend     RH1.1 5/3/2017  3:26 PM Javier Bertrand Worker Sign            Progress Notes by Skinyn Espana MD at 5/3/2017 11:07 AM     Author:  Skinny Espana MD Service:  Hospitalist Author Type:  Physician    Filed:  5/3/2017 11:28 AM Date of Service:  5/3/2017 11:07 AM Note Created:  5/3/2017 11:07 AM    Status:  Signed :  Skinny Espana MD (Physician)         [facility]    Hospitalist Progress Note    Date of Service (when I saw the patient): 05/03/2017    Assessment & Plan   Milagros Marie is a 89 year old female with a past medical history significant for chronic kidney disease stage IV, hypertension, GERD and dementia who presents to the Emergency Department with hyponatremia, leg discomfort and weakness following a fall.     Hyponatremia  Na 125 on admit. Na on 3/30 was 132. Unsure of the present duration. Suspected this was due to hypovolemia on admit, though I do note she has some pain, so cannot completely rule out SIADH at this point.   - Treated with NS 75 mL per hour.  Na has improved to 131 today.   - Will reduce her IVF today to 40 ml per hr and continue to follow.   - Urine osmolality, serum osmolality is low normal at 255 and serum osmol is low at 271 and not consistent with clear SIADH.    - Follow Na.      Left leg discomfort following a fall   Deconditioning.   No evidence of fracture. Pain control as needed with judicious use of narcotics.   - PT/OT    Chronic kidney disease stage IV.   Creatinine baseline seems to be around 1.5-1.8.  Cr is 1.9 on admit.   - Has improved with IVF and is 1.67 today.   - IVF as above.      Hypertension.   Will continue with her PTA regimen.     Hypothyroidism.   Will continue with Synthroid.     DVT Prophylaxis: Pneumatic Compression Devices  Code Status: DNR/DNI    Disposition:[JR1.1]  Clinically improving. Follow Na.  Possible d/c tomorrow.[JR1.2]       Skinny Espana       Interval History[JR1.1]   Feels well other than feeling tired due to lack of sleep last night.  No new complaints.  Vitals stable and Na improving.[JR1.2]     -Data reviewed today: I reviewed all new labs and imaging results over the last 24 hours. I personally reviewed no images or EKG's today.    Physical Exam   Temp: 98.3  F (36.8  C) Temp src: Oral BP: 152/70 Pulse: 68 Heart Rate: 80 Resp: 16 SpO2: 94 % O2 Device: None (Room air) Oxygen Delivery: 2.5 LPM  There were no vitals filed for this visit.  Vital Signs with Ranges  Temp:  [97.7  F (36.5  C)-98.6  F (37  C)] 98.3  F (36.8  C)  Pulse:  [64-90] 68  Heart Rate:  [73-80] 80  Resp:  [12-21] 16  BP: (148-169)/(70-85) 152/70  SpO2:  [89 %-97 %] 94 %  I/O last 3 completed shifts:  In: 100 [I.V.:100]  Out: 1600 [Urine:1600]    Gen: Patient in no acute distress.  Appears comfortable.  Heart:  S1S2+, regular rate and rhythm, No murmurs.  Lungs:  Clear to auscultation, no wheezing, no rales.   Abdomen:  Soft, non tender, non distended, bowel sounds positive.  Extremities:  No edema.    Medications     NaCl 75 mL/hr at 05/03/17 0644       acetaminophen  500 mg Oral TID     amLODIPine (NORVASC) tablet 10 mg  10 mg Oral Daily     brimonidine-timolol  1 drop Left Eye BID     dorzolamide  1 drop Left Eye BID     irbesartan  150 mg Oral BID     metoprolol  75 mg Oral BID     pantoprazole  40 mg Oral BID AC     travoprost (MARY Free)  1 drop Left Eye At Bedtime       Data     Recent Labs  Lab 05/03/17  0943 05/03/17  0030 05/02/17  1820   WBC 6.9  --  7.9   HGB 8.9*  --  9.8*   MCV 93  --  91     --  405   * 127* 125*   POTASSIUM 4.6  --  5.3   CHLORIDE 100  --   94   CO2 23  --  23   BUN 33*  --  39*   CR 1.67*  --  1.90*   ANIONGAP 8  --  8   SAMSON 8.6  --  8.9   *  --  102*   ALBUMIN  --   --  3.6   PROTTOTAL  --   --  7.5   BILITOTAL  --   --  0.4   ALKPHOS  --   --  147   ALT  --   --  23   AST  --   --  19       No results found for this or any previous visit (from the past 24 hour(s)).[JR1.1]     Revision History        User Key Date/Time User Provider Type Action    > JR1.2 5/3/2017 11:28 AM Skinny Espana MD Physician Sign     JR1.1 5/3/2017 11:07 AM Skinny Espana MD Physician             Progress Notes by Delfina Sheriff PT at 5/3/2017 11:01 AM     Author:  Delfina Sheriff PT Service:  (none) Author Type:  Physical Therapist    Filed:  5/3/2017 11:01 AM Date of Service:  5/3/2017 11:01 AM Note Created:  5/3/2017 11:01 AM    Status:  Signed :  Delfina Sheriff PT (Physical Therapist)          05/03/17 1000   Quick Adds   Type of Visit Initial PT Evaluation   Living Environment   Lives With facility resident   Living Arrangements extended care facility   Home Accessibility no concerns   Transportation Available family or friend will provide   Living Environment Comment Patient lives at [facility].   Self-Care   Usual Activity Tolerance moderate   Current Activity Tolerance fair   Regular Exercise yes   Activity/Exercise Type strength training;walking  (Receives home based PT and OT services)   Equipment Currently Used at Home walker, rolling   Activity/Exercise/Self-Care Comment Patient ambulates with a 4WW at baseline.   Functional Level Prior   Ambulation 1-->assistive equipment   Transferring 1-->assistive equipment   Toileting 1-->assistive equipment   Bathing 3-->assistive equipment and person   Dressing 2-->assistive person   Eating 0-->independent   Communication 2-->difficulty speaking (not related to language barrier)   Swallowing 0-->swallows foods/liquids without difficulty   Cognition 1 - attention  or memory deficits   Fall history within last six months yes   Number of times patient has fallen within last six months 3   Which of the above functional risks had a recent onset or change? ambulation   Prior Functional Level Comment Patient's daughter reports that, at baseline, patient is able to ambulate and go to bathroom within her room and walks down to dining room at long term care facility without assistance, with use of walker.   General Information   Onset of Illness/Injury or Date of Surgery - Date 05/02/17   Referring Physician Nick Pruitt, DO   Patient/Family Goals Statement Patient unable to state PT goal. Per discussion with patient's daughter, she would like patient to return to [facility] upon discharge (vs. discharge to TCU).   Pertinent History of Current Problem (include personal factors and/or comorbidities that impact the POC) Patient is a 89-year-old female with a past medical history significant for chronic kidney disease stage IV, hypertension, GERD and dementia who presents to the Emergency Department with hyponatremia and leg discomfort and weakness following a fall.    Precautions/Limitations fall precautions   General Observations Patient wears neoprene knee brace on right knee.   General Info Comments Activity: up with assist.   Cognitive Status Examination   Orientation person;time   Level of Consciousness alert   Follows Commands and Answers Questions 75% of the time;able to follow single-step instructions   Personal Safety and Judgment impaired   Memory impaired   Cognitive Comment Patient required orientation to place; she thought that she was at [facility] throughout session.   Pain Assessment   Patient Currently in Pain Yes, see Vital Sign flowsheet  (Reported mild pain in knees with ambulation)   Posture    Posture Forward head position;Protracted shoulders   Range of Motion (ROM)   ROM Comment LE AROM within functional limits.   Strength   Strength Comments LE strength  grossly 4/5 bilaterally.   Bed Mobility   Bed Mobility Comments Patient transfers supine to sit with HOB flat and bed rail with SBA.   Transfer Skills   Transfer Comments Patient transfers sit <> stand with CGA and FWW.   Gait   Gait Gait Skill;Gait Analysis   Gait Skills   Level of [place]: Gait contact guard   Physical Assist/Nonphysical Assist: Gait supervision;verbal cues;1 person assist   Weight-Bearing Restrictions: Gait full weight-bearing   Assistive Device for Transfer: Gait rolling walker   Gait Distance bed to chair   Gait Analysis   Gait Pattern Used swing-through gait   Gait Deviations Noted decreased roberto;decreased step length;decreased stride length;decreased toe-to-floor clearance;decreased weight-shifting ability   Impairments Contributing to Gait Deviations impaired balance;pain;decreased ROM;decreased strength   Balance   Balance Comments Mild instability noted with ambulation.   General Therapy Interventions   Planned Therapy Interventions balance training;bed mobility training;gait training;neuromuscular re-education;ROM;strengthening;stretching;transfer training;home program guidelines;progressive activity/exercise   Clinical Impression   Criteria for Skilled Therapeutic Intervention yes, treatment indicated   PT Diagnosis Gait instability, impaired functional mobility secondary to decreased LE strength   Functional limitations due to impairments Impaired bed mobility, transfers, and ambulation   Clinical Presentation Stable/Uncomplicated   Clinical Presentation Rationale Patient's medical status improving.   Clinical Decision Making (Complexity) Low complexity   Therapy Frequency` daily   Predicted Duration of Therapy Intervention (days/wks) 3 days   Anticipated Equipment Needs at Discharge front wheeled walker   Anticipated Discharge Disposition Home with Assist;Home with Home Therapy   Risk & Benefits of therapy have been explained Yes   Patient, Family & other staff in agreement  "with plan of care Yes   Clinical Impression Comments Patient would benefit from skilled PT services to address impairments in functional mobility.   [facility] AM-PAC TM \"6 Clicks\"   2016, Trustees of [facility], under license to [employer].  All rights reserved.   6 Clicks Short Forms Basic Mobility Inpatient Short Form   [facility] AM-PAC  \"6 Clicks\" V.2 Basic Mobility Inpatient Short Form   1. Turning from your back to your side while in a flat bed without using bedrails? 4 - None   2. Moving from lying on your back to sitting on the side of a flat bed without using bedrails? 3 - A Little   3. Moving to and from a bed to a chair (including a wheelchair)? 3 - A Little   4. Standing up from a chair using your arms (e.g., wheelchair, or bedside chair)? 3 - A Little   5. To walk in hospital room? 3 - A Little   6. Climbing 3-5 steps with a railing? 2 - A Lot   Basic Mobility Raw Score (Score out of 24.Lower scores equate to lower levels of function) 18   Total Evaluation Time   Total Evaluation Time (Minutes) 10[LH1.1]        Revision History        User Key Date/Time User Provider Type Action    > LH1.1 5/3/2017 11:01 AM Delfina Sheriff, PT Physical Therapist Sign            Progress Notes by Therese Mann at 5/3/2017 10:54 AM     Author:  Therese Mann Service:  Spiritual Health Author Type:      Filed:  5/3/2017 10:57 AM Date of Service:  5/3/2017 10:54 AM Note Created:  5/3/2017 10:54 AM    Status:  Signed :  Therese Mann ()         SPIRITUAL HEALTH SERVICES  Progress Note  [facility]    Visit per pt request.  Pt was sitting up in chair when I came to room.  I introduced myself as the  and she seemed surprised.  Not sure if she was confused.  I asked her if there was anything I could do for her.  She responded no.  When I asked her about saying a prayer she responded that one can never get enough prayer.   We shared prayer and I let her know that SH is available " for her during her stay.      Therese Mann  Chaplain Resident  Pager [phone][HM1.1]     Revision History        User Key Date/Time User Provider Type Action    > HM1.1 5/3/2017 10:57 AM Therese Mann Sign                  Procedure Notes     No notes of this type exist for this encounter.         Progress Notes - Therapies (Notes from 05/03/17 through 05/06/17)      Progress Notes by Delfina Sheriff PT at 5/3/2017 11:01 AM     Author:  Delfina Sheriff PT Service:  (none) Author Type:  Physical Therapist    Filed:  5/3/2017 11:01 AM Date of Service:  5/3/2017 11:01 AM Note Created:  5/3/2017 11:01 AM    Status:  Signed :  Delfina Sheriff PT (Physical Therapist)          05/03/17 1000   Quick Adds   Type of Visit Initial PT Evaluation   Living Environment   Lives With facility resident   Living Arrangements extended care facility   Home Accessibility no concerns   Transportation Available family or friend will provide   Living Environment Comment Patient lives at [facility].   Self-Care   Usual Activity Tolerance moderate   Current Activity Tolerance fair   Regular Exercise yes   Activity/Exercise Type strength training;walking  (Receives home based PT and OT services)   Equipment Currently Used at Home walker, rolling   Activity/Exercise/Self-Care Comment Patient ambulates with a 4WW at baseline.   Functional Level Prior   Ambulation 1-->assistive equipment   Transferring 1-->assistive equipment   Toileting 1-->assistive equipment   Bathing 3-->assistive equipment and person   Dressing 2-->assistive person   Eating 0-->independent   Communication 2-->difficulty speaking (not related to language barrier)   Swallowing 0-->swallows foods/liquids without difficulty   Cognition 1 - attention or memory deficits   Fall history within last six months yes   Number of times patient has fallen within last six months 3   Which of the above functional risks had a recent onset or  change? ambulation   Prior Functional Level Comment Patient's daughter reports that, at baseline, patient is able to ambulate and go to bathroom within her room and walks down to dining room at long term care facility without assistance, with use of walker.   General Information   Onset of Illness/Injury or Date of Surgery - Date 05/02/17   Referring Physician Nick Pruitt, DO   Patient/Family Goals Statement Patient unable to state PT goal. Per discussion with patient's daughter, she would like patient to return to [facility] upon discharge (vs. discharge to TCU).   Pertinent History of Current Problem (include personal factors and/or comorbidities that impact the POC) Patient is a 89-year-old female with a past medical history significant for chronic kidney disease stage IV, hypertension, GERD and dementia who presents to the Emergency Department with hyponatremia and leg discomfort and weakness following a fall.    Precautions/Limitations fall precautions   General Observations Patient wears neoprene knee brace on right knee.   General Info Comments Activity: up with assist.   Cognitive Status Examination   Orientation person;time   Level of Consciousness alert   Follows Commands and Answers Questions 75% of the time;able to follow single-step instructions   Personal Safety and Judgment impaired   Memory impaired   Cognitive Comment Patient required orientation to place; she thought that she was at [facility] throughout session.   Pain Assessment   Patient Currently in Pain Yes, see Vital Sign flowsheet  (Reported mild pain in knees with ambulation)   Posture    Posture Forward head position;Protracted shoulders   Range of Motion (ROM)   ROM Comment LE AROM within functional limits.   Strength   Strength Comments LE strength grossly 4/5 bilaterally.   Bed Mobility   Bed Mobility Comments Patient transfers supine to sit with HOB flat and bed rail with SBA.   Transfer Skills   Transfer Comments Patient  "transfers sit <> stand with CGA and FWW.   Gait   Gait Gait Skill;Gait Analysis   Gait Skills   Level of [place]: Gait contact guard   Physical Assist/Nonphysical Assist: Gait supervision;verbal cues;1 person assist   Weight-Bearing Restrictions: Gait full weight-bearing   Assistive Device for Transfer: Gait rolling walker   Gait Distance bed to chair   Gait Analysis   Gait Pattern Used swing-through gait   Gait Deviations Noted decreased roberto;decreased step length;decreased stride length;decreased toe-to-floor clearance;decreased weight-shifting ability   Impairments Contributing to Gait Deviations impaired balance;pain;decreased ROM;decreased strength   Balance   Balance Comments Mild instability noted with ambulation.   General Therapy Interventions   Planned Therapy Interventions balance training;bed mobility training;gait training;neuromuscular re-education;ROM;strengthening;stretching;transfer training;home program guidelines;progressive activity/exercise   Clinical Impression   Criteria for Skilled Therapeutic Intervention yes, treatment indicated   PT Diagnosis Gait instability, impaired functional mobility secondary to decreased LE strength   Functional limitations due to impairments Impaired bed mobility, transfers, and ambulation   Clinical Presentation Stable/Uncomplicated   Clinical Presentation Rationale Patient's medical status improving.   Clinical Decision Making (Complexity) Low complexity   Therapy Frequency` daily   Predicted Duration of Therapy Intervention (days/wks) 3 days   Anticipated Equipment Needs at Discharge front wheeled walker   Anticipated Discharge Disposition Home with Assist;Home with Home Therapy   Risk & Benefits of therapy have been explained Yes   Patient, Family & other staff in agreement with plan of care Yes   Clinical Impression Comments Patient would benefit from skilled PT services to address impairments in functional mobility.   [facility] AM-PAC TM \"6 " "Clicks\"   2016, Trustees of [facility], under license to [employer].  All rights reserved.   6 Clicks Short Forms Basic Mobility Inpatient Short Form   [facility] AM-PAC  \"6 Clicks\" V.2 Basic Mobility Inpatient Short Form   1. Turning from your back to your side while in a flat bed without using bedrails? 4 - None   2. Moving from lying on your back to sitting on the side of a flat bed without using bedrails? 3 - A Little   3. Moving to and from a bed to a chair (including a wheelchair)? 3 - A Little   4. Standing up from a chair using your arms (e.g., wheelchair, or bedside chair)? 3 - A Little   5. To walk in hospital room? 3 - A Little   6. Climbing 3-5 steps with a railing? 2 - A Lot   Basic Mobility Raw Score (Score out of 24.Lower scores equate to lower levels of function) 18   Total Evaluation Time   Total Evaluation Time (Minutes) 10[LH1.1]        Revision History        User Key Date/Time User Provider Type Action    > LH1.1 5/3/2017 11:01 AM Delfina Sheriff, PT Physical Therapist Sign            "

## 2017-05-02 NOTE — ED PROVIDER NOTES
History   Chief Complaint:  Left leg pain    HPI   Milagros Marie is a 89 year old female who presents with left leg pain. Eight days ago, the patient fell in the bathroom of her nursing home. She did not experience any pain initially, but the next day the patient began experiencing left knee pain. She underwent imaging which were all negative at that time. Since that time, the patient's pain has continued and she has been experiencing difficulty ambulating. She states that she is unable to lift her foot to walk. She describes the sensation as feeling as though something is holding her foot down. The patient lives at [facility] [place] and went sent to the ED with labs today at [place] revealing Na 125 and K+5.3. She was sent to the ED for lab recheck check. She denies any hip pain or back pain, headache, vision changes, bleeding, abdominal pain, dysuria, hematuria, constipation, diarrhea.    Code Status: DNR/DNI per POLST dated 4/7/17    Xray Hip unilat w pelvis 4/24/17  No acute process. Degenerative changes    Xray Left Knee 5/1/17  Intact total knee arthoplasy.  No acute fracture    Allergies:  Sulfa drugs     Medications:    Protonix  Fergon  Levothyroxine  Amlodipine besylate  Metoprolol  Azopt 1% ophthalmic   Keflex  Avapro  Probiotic product  Acetaminophen  Combigan ophthalmic solution  Allegra  Travatan ophthalmic solution  Magnesium  Calcium carbonate-vitamin D     Past Medical History:    DJD  Hypertension  Glaucoma  Homocysteinemia  Osteopenia  Reflux  Renal failure  Stenosis  Stress incontinence  Hypothyroidism    Past Surgical History:    Bladder surgery  Blepharoplasty  Carpal tunnel release  Cataract iol  Eye surgery  Hysterectomy  Joint replacement, knee    Family History:    Heart disease - mother, father  Colorectal cancer - sister  Cancer - sister    Social History:  Marital Status:   Presents to the ED with her daughter via EMS  Tobacco Use: negative  Alcohol Use: positive  PCP:  "Maria Eugenia Holley     Review of Systems   Musculoskeletal:        Positive for left leg pain   All other systems reviewed and are negative.    Physical Exam   First Vitals:  BP: 169/72 mmHg  Heart Rate: 70  Resp: 18  SpO2: 96% RA  Temp: 98.6  F (oral)  Patient Vitals for the past 24 hrs:   BP Temp Temp src Pulse Heart Rate Resp SpO2 Height   05/02/17 2000 160/73 - - - 76 20 97 % -   05/02/17 1930 157/77 - - - 75 14 95 % -   05/02/17 1915 - - - - 74 12 95 % -   05/02/17 1900 165/85 - - - 75 21 95 % -   05/02/17 1858 - - - - 73 18 95 % -   05/02/17 1854 - - - - 74 16 90 % -   05/02/17 1853 - - - - 76 19 (!) 89 % -   05/02/17 1827 - - - - - - 93 % -   05/02/17 1708 169/72 98.6  F (37  C) Oral 70 - 18 96 % 1.6 m (5' 3\")      Physical Exam  Nursing notes reviewed. Vitals reviewed.  General: Alert. Well kept.  Eyes:  Conjunctiva non-injected, non-icteric. 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  Ears:TM s normal.  Neck/Throat: Moist mucous membranes, oropharynx clear without erythema or exudate. No cervical lymphadenopathy.  Normal voice.  Cardiac: Regular rhythm. Normal heart sounds with no murmur/rubs/click. 2+ DP pulse.  Pulmonary: Clear and equal breath sounds bilaterally. No crackles/rales. No wheezing  Abdomen: Soft. Non-distended. No masses. Mild tenderness over RLQ known hernia without mass, guarding or rebound.  Musculoskeletal: Normal gross range of motion of all 4 extremities.  Soft brace right knee. Ace wrap left knee.  No pain to palpation cervical, thoracic, lumbar spine  Neurological: Alert and oriented to person, situation, and place.   Skin: Warm and dry without rashes or petechiae. Normal appearance of visualized exposed skin.   Psych: Affect normal. Good eye contact.     Emergency Department Course   ECG :  Indication:cardiac evaluation  Time: (18:14:41)  Rate 71 bpm. MT interval 236. QRS duration 112. QT/QTc 404/439. P-R-T axes 65 -11 85.  Interpreted at 1821 by Angeli Mark, ZHAO and by Dr." .    Laboratory:  CMP:  , K5.3, glucose (H), BUN 39 (H), GFR 25 (L),  (Creatinine 1.90)  CBC:  WBC 7.9, HGB 9.8 (L), , RBC 3.13 (L), HGB 9.8 (L), HCT 28.6 (L), otherwise WNL    Interventions:  Medications   0.9% sodium chloride infusion ( Intravenous New Bag 5/2/17 1937)     Emergency Department Course:  The patient arrived in the emergency department via EMS. Daughter, who is power of , is present.   Nursing notes and vitals reviewed.   I performed an exam of the patient as documented above.   EKG was done, interpretation as above.   A peripheral IV was established. Blood was drawn from the patient. This was sent for laboratory testing, findings above.   Urine sample was obtained and sent for laboratory analysis, findings above.   Findings and plan explained to the patient and daughter who consents to admission. Discussed the patient with Dr. Pruitt, who will admit the patient to a bed for further monitoring, evaluation, and treatment.    Impression & Plan    Medical Decision Making:  Milagros Marie is a 89 year old female who presents with left leg pain and generalized weakness.  Differential diagnosis includes infection, stroke, ACS, electrolyte abnormality, fracture.  On examination the patient has intact sensation and motion to bilateral lower extremities with mild pain at onset of ROM but relieved with ongoing ROM.  She has a mild peaked T wave but no arythmias.  She has intact mental status but daughter notes mild decreased alertness over the last week.  CMP today revealed  and K+ 5.3 with mild improvement in her creatinine of 1.90.  Urinalysis negative for infection and WBC 7.9.  Symptoms not consistent with stroke and no indication for head CT today I spoke with Dr. Pruitt who has graciously accepted for admission.    Diagnosis:    ICD-10-CM    1. Hyponatremia E87.1    2. Generalized muscle weakness M62.81      Disposition:  Admitted to medical bed  Jose QUACH am  serving as a scribe on 5/2/2017 at 5:15 PM to personally document services performed by Angeli Mark NP based on my observations and the provider's statements to me.          Angeli Mark CNP  05/02/17 1955       Angeli Mark CNP  05/02/17 2044

## 2017-05-02 NOTE — IP AVS SNAPSHOT
"` `     [place] [address] [facility]: [phone]                                              INTERAGENCY TRANSFER FORM - NURSING   2017                    Hospital Admission Date: 2017  CHELLY RENAE   : 1923  Sex: Female        Attending Provider: Nick Pruitt DO     Allergies:  Sulfa Drugs    Infection:  None   Service:  HOSPITALIST    Ht:  1.6 m (5' 3\")   Wt:  61.1 kg (134 lb 11.2 oz)   Admission Wt:  61.2 kg (134 lb 14.7 oz)    BMI:  23.86 kg/m 2   BSA:  1.65 m 2            Patient PCP Information     Provider PCP Type    Maria Eugenia Holley MD General      Current Code Status     Date Active Code Status Order ID Comments User Context       Prior      Code Status History     Date Active Date Inactive Code Status Order ID Comments User Context    2017  8:02 AM  DNR/DNI [id number]  Skinny Espana MD Outpatient    2017  9:36 PM 2017  8:02 AM DNR/DNI [id number]  Nick Pruitt DO Inpatient    3/21/2017 11:50 AM 3/24/2017  5:41 PM DNR/DNI [id number]  Pb Woody MD Inpatient      Advance Directives        Does patient have a scanned Advance Directive/ACP document in EPIC?           Yes        Hospital Problems as of 2017              Priority Class Noted POA    Hyponatremia Medium  2017 Yes      Non-Hospital Problems as of 2017              Priority Class Noted    Preventative health care   2011    Fatigue   2011    Osteopenia   2011    ACP (advance care planning)   Unknown    Renal failure   Unknown    Health Care Home   2013    Advanced directives, counseling/discussion   2015    Hypothyroidism due to acquired atrophy of thyroid Medium  10/6/2016    Benign essential hypertension Medium  10/6/2016    Upper GI bleed Medium  3/21/2017    Anemia Medium  2017      Immunizations     Name Date      Pneumococcal (PCV 13) 08/28/15     Pneumococcal 23 valent 00     TD (ADULT, 7+) 10/11/07        "   END      ASSESSMENT     Discharge Profile Flowsheet     EXPECTED DISCHARGE     Patient's communication style  spoken language (English or Bilingual) 05/02/17 1706    Expected Discharge Date  05/06/17 05/06/17 0947   FINAL RESOURCES      DISCHARGE NEEDS ASSESSMENT     Resources List  Transitional Care 05/06/17 0947    Concerns To Be Addressed  discharge planning concerns 03/23/17 0831   Other Resources  Day care;[facility] (Every Wednesday) 11/25/16 1948    Patient/family verbalizes understanding of discharge plan recommendations?  Yes 05/06/17 0947   Transitional Care  Completed (Mana Joseph) 05/06/17 0947    Medical Team notified of plan?  yes 05/06/17 0947   PAS Number  [id number] 03/24/17 1113    Readmission Within The Last 30 Days  no previous admission in last 30 days 05/06/17 0947   Senior Linkage Line Referral Placed  03/23/17 03/24/17 1113    Anticipated Changes Related to Illness  inability to care for self 05/06/17 0947   Referrals Placed  Post Acute Facilities;Senior Linkage Line;Transportation 03/24/17 1113    Equipment Currently Used at Home  walker, rolling 05/06/17 0947   SKIN      Transportation Available  agency transportation;van, wheelchair accessible 05/06/17 0947   Inspection  Full 05/06/17 0816    # of Referrals Placed by CTS  Post Acute Facilities;Transportation 05/06/17 0947   Skin WDL  ex 05/06/17 0816    GASTROINTESTINAL (ADULT,PEDIATRIC,OB)     Skin Color/Characteristics  bruised (ecchymotic) 05/06/17 0816    GI WDL  WDL 05/06/17 0816   Skin Temperature  warm 05/05/17 2022    All Quadrants Bowel Sounds  audible and normoactive 05/05/17 2022   Skin Moisture  flaky;dry 05/05/17 2022    Last Bowel Movement  03/28/17 03/28/17 1137   Skin Integrity  bruise(s) 05/05/17 2022    Passing flatus  yes 05/04/17 0858   SAFETY      COMMUNICATION ASSESSMENT     Safety WDL  WDL 05/06/17 0816                 Assessment WDL (Within Defined Limits) Definitions           Safety WDL     Effective: 09/28/15  "   Row Information: <b>WDL Definition:</b> Bed in low position, wheels locked; call light in reach; upper side rails up x 2; ID band on<br> <font color=\"gray\"><i>Item=AS safety wdl>>List=AS safety wdl>>Version=F14</i></font>      Skin WDL     Effective: 09/28/15    Row Information: <b>WDL Definition:</b> Warm; dry; intact; elastic; without discoloration; pressure points without redness<br> <font color=\"gray\"><i>Item=AS skin wdl>>List=AS skin wdl>>Version=F14</i></font>      Vitals     Vital Signs Flowsheet     VITAL SIGNS     Side Effects Monitoring: Respiratory Depth  N 05/05/17 2011    Temp  98.3  F (36.8  C) 05/06/17 0802   Side Effects Monitoring: Sedation Level  1 05/05/17 2011    Temp src  Oral 05/05/17 2011   HEIGHT AND WEIGHT      Resp  16 05/06/17 0802   Height  1.6 m (5' 3\") 05/02/17 1715    Pulse  82 05/05/17 2011   Height Method  Stated 05/02/17 1715    Heart Rate  75 05/06/17 0802   Weight  61.1 kg (134 lb 11.2 oz) 05/06/17 0635    Pulse/Heart Rate Source  Monitor 05/05/17 2011   Estimated Weight (From ED)  60.3 kg (133 lb) 05/02/17 1715    BP  155/72 05/06/17 0802   EKG MONITORING      BP Location  Right arm 05/05/17 2011   Cardiac Regularity  Regular 05/02/17 1826    OXYGEN THERAPY     ALEJANDRO COMA SCALE      SpO2  95 % 05/06/17 0802   Best Eye Response  4-->(E4) spontaneous 05/02/17 1827    O2 Device  None (Room air) 05/06/17 0802   Best Motor Response  6-->(M6) obeys commands 05/02/17 1827    Oxygen Delivery  2.5 LPM 05/02/17 2131   Best Verbal Response  4-->(V4) confused (Dementia) 05/02/17 1715    PAIN/COMFORT     Roxie Coma Scale Score  14 05/02/17 1715    Patient Currently in Pain  denies 05/06/17 0813   POSITIONING      Preferred Pain Scale  word (verbal rating pain scale) 05/05/17 2011   Body Position  independently positioning 05/06/17 1049    Patient's Stated Pain Goal  No pain 05/05/17 2011   Head of Bed (HOB)  HOB flat 05/06/17 1049    0-10 Pain Scale  6 05/05/17 1649   Chair  Upright in " chair 05/04/17 1435    Pain Location  Knee 05/05/17 1649   Positioning/Transfer Devices  pillows;in use 05/05/17 2011    Pain Orientation  Left 05/05/17 1649   DAILY CARE      Pain Intervention(s)  Medication (See eMAR) 05/04/17 1656   Activity Type  activity adjusted per tolerance 05/06/17 1049    ANALGESIA SIDE EFFECTS MONITORING     Activity Level of Assistance  assistance, 1 person 05/06/17 1049    Side Effects Monitoring: Respiratory Quality  R 05/05/17 2011   Activity Assistive Device  gait belt;walker 05/06/17 1049            Patient Lines/Drains/Airways Status    Active LINES/DRAINS/AIRWAYS     Name: Placement date: Placement time: Site: Days: Last dressing change:    Peripheral IV 05/02/17 Right 05/02/17   1828      3             Patient Lines/Drains/Airways Status    Active PICC/CVC     None            Intake/Output Detail Report     Date Intake     Output Net    Shift P.O. I.V. IV Piggyback Total Urine Total       Noc 05/04/17 2300 - 05/05/17 0659 0 -- -- -- 850 850 -850    Day 05/05/17 0700 - 05/05/17 1459 480 -- -- 480 600 600 -120    Chloe 05/05/17 1500 - 05/05/17 2259 360 -- -- 360 735 735 -375    Noc 05/05/17 2300 - 05/06/17 0659 240 -- -- 240 550 550 -310    Day 05/06/17 0700 - 05/06/17 1459 -- -- -- -- 300 300 -300      Last Void/BM       Most Recent Value    Urine Occurrence 1 [missed hat] at 05/06/2017 0726    Stool Occurrence 1 at 05/05/2017 1152      Case Management/Discharge Planning     Case Management/Discharge Planning Flowsheet     REFERRAL INFORMATION     ASSESSMENT/CONCERNS TO BE ADDRESSED      Did the Initial Social Work Assessment result in a Social Work Case?  Yes 05/03/17 1425   Concerns To Be Addressed  discharge planning concerns 03/23/17 0831    Admission Type  inpatient 05/03/17 1425   DISCHARGE PLANNING      Arrived From  admitted as an inpatient 05/03/17 1425   Patient/family verbalizes understanding of discharge plan recommendations?  Yes 05/06/17 0947    Referral Source  case  finding 05/03/17 1425   Medical Team notified of plan?  yes 05/06/17 0947    # of Referrals Placed by CTS  Post Acute Facilities;Transportation 05/06/17 0947   Readmission Within The Last 30 Days  no previous admission in last 30 days 05/06/17 0947    Reason For Consult  discharge planning 05/03/17 1425   Anticipated Changes Related to Illness  inability to care for self 05/06/17 0947    Record Reviewed  medical record 05/03/17 1425   Transportation Available  agency transportation;van, wheelchair accessible 05/06/17 0947     Assigned to Case  Pooja Puga 05/05/17 1444   FINAL RESOURCES      Primary Care MD Name  Maria Eugenia Holley MD 03/23/17 0831   Equipment Currently Used at Home  walker, rolling 05/06/17 0947    LIVING ENVIRONMENT     Resources List  Transitional Care 05/06/17 0947    Lives With  facility resident 05/03/17 1425   Other Resources  Day care;County Worker (Every Wednesday) 11/25/16 1948    Living Arrangements  extended care facility 05/03/17 1425   Transitional Care  Completed ([facility]) 05/06/17 0947    Provides Primary Care For  no one, unable/limited ability to care for self 05/03/17 1425   PAS Number  [id number] 03/24/17 1113    Quality Of Family Relationships  supportive;involved 05/03/17 1425   Senior Linkage Line Referral Placed  03/23/17 03/24/17 1113    ASSESSMENT OF FAMILY/SOCIAL SUPPORT     Referrals Placed  Post Acute Facilities;Senior Linkage Line;Transportation 03/24/17 1113    Quality of Family Relationships  supportive;involved 05/03/17 1425   ABUSE RISK SCREEN      COPING/STRESS     QUESTION TO PATIENT:  Has a member of your family or a partner(now or in the past) intimidated, hurt, manipulated, or controlled you in any way?  patient declined to answer or is unable to answer 05/02/17 1715    Major Change/Loss/Stressor  hospitalization 05/02/17 2134   QUESTION TO PATIENT: Do you feel safe going back to the place where you are living?  yes 05/02/17 2134    Patient  Personal Strengths  strong support system 03/23/17 0831   OBSERVATION: Is there reason to believe there has been maltreatment of a vulnerable adult (ie. Physical/Sexual/Emotional abuse, self neglect, lack of adequate food, shelter, medical care, or financial exploitation)?  no 05/02/17 2134    EXPECTED DISCHARGE     (R) MENTAL HEALTH SUICIDE RISK      Expected Discharge Date  05/06/17 05/06/17 0947   Are you depressed or being treated for depression?  No 05/02/17 2134

## 2017-05-02 NOTE — IP AVS SNAPSHOT
"    [place] [address] [facility]: [phone]                                              INTERAGENCY TRANSFER FORM - PHYSICIAN ORDERS   2017                    Hospital Admission Date: 2017  CHELLY RENAE   : 1923  Sex: Female        Attending Provider: Nick Pruitt DO     Allergies:  Sulfa Drugs    Infection:  None   Service:  HOSPITALIST    Ht:  1.6 m (5' 3\")   Wt:  61.1 kg (134 lb 11.2 oz)   Admission Wt:  61.2 kg (134 lb 14.7 oz)    BMI:  23.86 kg/m 2   BSA:  1.65 m 2            Patient PCP Information     Provider PCP Type    Maria Eugenia Holley MD General      ED Clinical Impression     Diagnosis Description Comment Added By Time Added    Hyponatremia [E87.1] Hyponatremia [E87.1]  Angeli Mark CNP 2017  7:12 PM    Generalized muscle weakness [M62.81] Generalized muscle weakness [M62.81]  Angeli Mark CNP 2017  7:12 PM      Hospital Problems as of 2017              Priority Class Noted POA    Hyponatremia Medium  2017 Yes      Non-Hospital Problems as of 2017              Priority Class Noted    Preventative health care   2011    Fatigue   2011    Osteopenia   2011    ACP (advance care planning)   Unknown    Renal failure   Unknown    Health Care Home   2013    Advanced directives, counseling/discussion   2015    Hypothyroidism due to acquired atrophy of thyroid Medium  10/6/2016    Benign essential hypertension Medium  10/6/2016    Upper GI bleed Medium  3/21/2017    Anemia Medium  2017      Code Status History     Date Active Date Inactive Code Status Order ID Comments User Context    2017  8:02 AM  DNR/DNI [id number]  Skinny Espana MD Outpatient    2017  9:36 PM 2017  8:02 AM DNR/DNI [id number]  Nick Pruitt DO Inpatient    3/21/2017 11:50 AM 3/24/2017  5:41 PM DNR/DNI [id number]  Pb Woody MD Inpatient         Medication Review      CONTINUE these medications which " may have CHANGED, or have new prescriptions. If we are uncertain of the size of tablets/capsules you have at home, strength may be listed as something that might have changed.        Dose / Directions Comments    Magnesium 500 MG Caps   Indication:  Low Amount of Magnesium in the Blood   This may have changed:  when to take this   Used for:  Other fatigue        Dose:  1 tablet   Take 1 tablet by mouth every other day Daughter does not know salt form   Quantity:  30 capsule   Refills:  3          CONTINUE these medications which have NOT CHANGED        Dose / Directions Comments    ACETAMINOPHEN EXTRA STRENGTH 500 MG tablet   Generic drug:  acetaminophen        Dose:  500 mg   Take 500 mg by mouth 3 times daily   Refills:  0        ADVANCED PROBIOTIC Caps        Dose:  1 tablet   Take 1 tablet by mouth daily (with lunch)   Refills:  0        ALLEGRA PO        Dose:  180 mg   Take 180 mg by mouth daily.   Refills:  0        AMLODIPINE BESYLATE PO        Dose:  10 mg   Take 10 mg by mouth daily   Refills:  0        CALCIUM 600 + D PO        Dose:  1 tablet   Take 1 tablet by mouth daily   Refills:  0        cephALEXin 500 MG capsule   Commonly known as:  KEFLEX        Dose:  2000 mg   Take 2,000 mg by mouth as needed Administer one hour prior to dental procedure   Refills:  99        COMBIGAN 0.2-0.5 % ophthalmic solution   Generic drug:  brimonidine-timolol        Dose:  1 drop   Place 1 drop Into the left eye 2 times daily   Refills:  4        dorzolamide 2 % ophthalmic solution   Commonly known as:  TRUSOPT        Dose:  1 drop   Place 1 drop Into the left eye 2 times daily   Refills:  0        ferrous gluconate 324 (38 FE) MG tablet   Commonly known as:  FERGON   Used for:  Upper GI bleed        Dose:  324 mg   Take 1 tablet (324 mg) by mouth 2 times daily   Quantity:  100 tablet   Refills:  1        irbesartan 300 MG tablet   Commonly known as:  AVAPRO   Used for:  Essential hypertension        Dose:  150 mg    Take 0.5 tablets (150 mg) by mouth 2 times daily   Quantity:  30 tablet   Refills:  0        levothyroxine 50 MCG tablet   Commonly known as:  SYNTHROID/LEVOTHROID   Used for:  Hypothyroidism due to acquired atrophy of thyroid        TAKE 1 TABLET (50 MCG) BY MOUTH DAILY   Quantity:  90 tablet   Refills:  2        metoprolol 50 MG tablet   Commonly known as:  LOPRESSOR   Used for:  Encounter for medication refill        TAKE 1 AND 1/2 TABLET TWICE DAILY WITH FOOD OR MILK   Quantity:  270 tablet   Refills:  3        pantoprazole 40 MG EC tablet   Commonly known as:  PROTONIX   Used for:  Upper GI bleed        Take one tab orally twice a day for 8 weeks, then daily indefinitely,  Take on empty stomach about half an hour before eating.   Quantity:  60 tablet   Refills:  2        TRAVATAN 0.004 % ophthalmic solution   Generic drug:  travoprost Z (benzalkonium)        Dose:  1 drop   1 drop. One drop in left eye at bedtime.   Refills:  0          STOP taking     HYDROcodone-acetaminophen 5-325 MG per tablet   Commonly known as:  NORCO                   Summary of Visit     Reason for your hospital stay       You presented after a fall and found to have low sodium levels. This was treated fluid restriction with improvement.             After Care     Activity - Up with nursing assistance           Advance Diet as Tolerated       Follow this diet upon discharge:       Fluid restriction 1500 ML FLUID      Combination Diet Regular Diet Adult       Daily weights       Call Provider for weight gain of more than 2 pounds per day or 5 pounds per week.       Fall precautions           General info for SNF       Length of Stay Estimate: Short Term Care: Estimated # of Days <30  Condition at Discharge: Improving  Level of care:skilled   Rehabilitation Potential: Good  Admission H&P remains valid and up-to-date: Yes  Recent Chemotherapy: N/A  Use Nursing Home Standing Orders: Yes       Intake and output       Every shift        Mantoux instructions       Give two-step Mantoux (PPD) Per Facility Policy Yes             Referrals     Occupational Therapy Adult Consult       Evaluate and treat as clinically indicated.    Reason:  Deconditioning / Falls       Physical Therapy Adult Consult       Evaluate and treat as clinically indicated.    Reason:  Deconditioning / Falls             Follow-Up Appointment Instructions     Future Labs/Procedures    Follow Up and recommended labs and tests     Comments:    Follow up with senior living physician.  The following labs/tests are recommended: Will need to occasionally monitor a BMP to monitor her Na and Cr levels.      Follow-Up Appointment Instructions     Follow Up and recommended labs and tests       Follow up with senior living physician.  The following labs/tests are recommended: Will need to occasionally monitor a BMP to monitor her Na and Cr levels.             Statement of Approval     Ordered          05/06/17 0803  I have reviewed and agree with all the recommendations and orders detailed in this document.  EFFECTIVE NOW     Approved and electronically signed by:  Skinny Espana MD

## 2017-05-03 ENCOUNTER — APPOINTMENT (OUTPATIENT)
Dept: PHYSICAL THERAPY | Facility: CLINIC | Age: 82
DRG: 641 | End: 2017-05-03
Attending: INTERNAL MEDICINE
Payer: MEDICARE

## 2017-05-03 LAB
ANION GAP SERPL CALCULATED.3IONS-SCNC: 8 MMOL/L (ref 3–14)
BUN SERPL-MCNC: 33 MG/DL (ref 7–30)
CALCIUM SERPL-MCNC: 8.6 MG/DL (ref 8.5–10.1)
CHLORIDE SERPL-SCNC: 100 MMOL/L (ref 94–109)
CO2 SERPL-SCNC: 23 MMOL/L (ref 20–32)
CREAT SERPL-MCNC: 1.67 MG/DL (ref 0.52–1.04)
ERYTHROCYTE [DISTWIDTH] IN BLOOD BY AUTOMATED COUNT: 13.7 % (ref 10–15)
GFR SERPL CREATININE-BSD FRML MDRD: 29 ML/MIN/1.7M2
GLUCOSE SERPL-MCNC: 113 MG/DL (ref 70–99)
HCT VFR BLD AUTO: 26.5 % (ref 35–47)
HGB BLD-MCNC: 8.9 G/DL (ref 11.7–15.7)
MCH RBC QN AUTO: 31.2 PG (ref 26.5–33)
MCHC RBC AUTO-ENTMCNC: 33.6 G/DL (ref 31.5–36.5)
MCV RBC AUTO: 93 FL (ref 78–100)
OSMOLALITY SERPL: 271 MMOL/KG (ref 280–301)
PLATELET # BLD AUTO: 396 10E9/L (ref 150–450)
POTASSIUM SERPL-SCNC: 4.6 MMOL/L (ref 3.4–5.3)
RBC # BLD AUTO: 2.85 10E12/L (ref 3.8–5.2)
SODIUM SERPL-SCNC: 127 MMOL/L (ref 133–144)
SODIUM SERPL-SCNC: 128 MMOL/L (ref 133–144)
SODIUM SERPL-SCNC: 129 MMOL/L (ref 133–144)
SODIUM SERPL-SCNC: 131 MMOL/L (ref 133–144)
WBC # BLD AUTO: 6.9 10E9/L (ref 4–11)

## 2017-05-03 PROCEDURE — 40000193 ZZH STATISTIC PT WARD VISIT: Performed by: PHYSICAL THERAPIST

## 2017-05-03 PROCEDURE — 80048 BASIC METABOLIC PNL TOTAL CA: CPT | Performed by: INTERNAL MEDICINE

## 2017-05-03 PROCEDURE — 97530 THERAPEUTIC ACTIVITIES: CPT | Mod: GP | Performed by: PHYSICAL THERAPIST

## 2017-05-03 PROCEDURE — 36415 COLL VENOUS BLD VENIPUNCTURE: CPT | Performed by: INTERNAL MEDICINE

## 2017-05-03 PROCEDURE — 25000128 H RX IP 250 OP 636: Performed by: INTERNAL MEDICINE

## 2017-05-03 PROCEDURE — 83930 ASSAY OF BLOOD OSMOLALITY: CPT | Performed by: INTERNAL MEDICINE

## 2017-05-03 PROCEDURE — 40000894 ZZH STATISTIC OT IP EVAL DEFER: Performed by: OCCUPATIONAL THERAPIST

## 2017-05-03 PROCEDURE — 84295 ASSAY OF SERUM SODIUM: CPT | Performed by: INTERNAL MEDICINE

## 2017-05-03 PROCEDURE — 97116 GAIT TRAINING THERAPY: CPT | Mod: GP | Performed by: PHYSICAL THERAPIST

## 2017-05-03 PROCEDURE — A9270 NON-COVERED ITEM OR SERVICE: HCPCS | Mod: GY | Performed by: INTERNAL MEDICINE

## 2017-05-03 PROCEDURE — 25000132 ZZH RX MED GY IP 250 OP 250 PS 637: Mod: GY | Performed by: INTERNAL MEDICINE

## 2017-05-03 PROCEDURE — 12000000 ZZH R&B MED SURG/OB

## 2017-05-03 PROCEDURE — 99233 SBSQ HOSP IP/OBS HIGH 50: CPT | Performed by: INTERNAL MEDICINE

## 2017-05-03 PROCEDURE — 85027 COMPLETE CBC AUTOMATED: CPT | Performed by: INTERNAL MEDICINE

## 2017-05-03 PROCEDURE — 97110 THERAPEUTIC EXERCISES: CPT | Mod: GP | Performed by: PHYSICAL THERAPIST

## 2017-05-03 PROCEDURE — 97161 PT EVAL LOW COMPLEX 20 MIN: CPT | Mod: GP | Performed by: PHYSICAL THERAPIST

## 2017-05-03 RX ADMIN — ACETAMINOPHEN 500 MG: 500 TABLET, FILM COATED ORAL at 21:55

## 2017-05-03 RX ADMIN — DORZOLAMIDE HYDROCHLORIDE 1 DROP: 20 SOLUTION/ DROPS OPHTHALMIC at 21:02

## 2017-05-03 RX ADMIN — DORZOLAMIDE HYDROCHLORIDE 1 DROP: 20 SOLUTION/ DROPS OPHTHALMIC at 09:39

## 2017-05-03 RX ADMIN — PANTOPRAZOLE SODIUM 40 MG: 40 TABLET, DELAYED RELEASE ORAL at 06:44

## 2017-05-03 RX ADMIN — IRBESARTAN 150 MG: 150 TABLET ORAL at 16:12

## 2017-05-03 RX ADMIN — BRIMONIDINE TARTRATE, TIMOLOL MALEATE 1 DROP: 2; 5 SOLUTION/ DROPS OPHTHALMIC at 20:53

## 2017-05-03 RX ADMIN — TRAVOPROST 1 DROP: 0.04 SOLUTION/ DROPS OPHTHALMIC at 21:55

## 2017-05-03 RX ADMIN — ACETAMINOPHEN 500 MG: 500 TABLET, FILM COATED ORAL at 16:11

## 2017-05-03 RX ADMIN — IRBESARTAN 150 MG: 150 TABLET ORAL at 09:32

## 2017-05-03 RX ADMIN — BRIMONIDINE TARTRATE, TIMOLOL MALEATE 1 DROP: 2; 5 SOLUTION/ DROPS OPHTHALMIC at 00:06

## 2017-05-03 RX ADMIN — METOPROLOL TARTRATE 75 MG: 50 TABLET, FILM COATED ORAL at 20:50

## 2017-05-03 RX ADMIN — AMLODIPINE BESYLATE 10 MG: 10 TABLET ORAL at 09:32

## 2017-05-03 RX ADMIN — METOPROLOL TARTRATE 75 MG: 50 TABLET, FILM COATED ORAL at 09:32

## 2017-05-03 RX ADMIN — BRIMONIDINE TARTRATE, TIMOLOL MALEATE 1 DROP: 2; 5 SOLUTION/ DROPS OPHTHALMIC at 08:36

## 2017-05-03 RX ADMIN — TRAVOPROST 1 DROP: 0.04 SOLUTION/ DROPS OPHTHALMIC at 00:06

## 2017-05-03 RX ADMIN — ACETAMINOPHEN 500 MG: 500 TABLET, FILM COATED ORAL at 09:32

## 2017-05-03 RX ADMIN — PANTOPRAZOLE SODIUM 40 MG: 40 TABLET, DELAYED RELEASE ORAL at 16:12

## 2017-05-03 RX ADMIN — SODIUM CHLORIDE: 9 INJECTION, SOLUTION INTRAVENOUS at 06:44

## 2017-05-03 RX ADMIN — DORZOLAMIDE HYDROCHLORIDE 1 DROP: 20 SOLUTION/ DROPS OPHTHALMIC at 00:05

## 2017-05-03 NOTE — ED NOTES
[facility]  ED Nurse Handoff Report    ED Chief complaint: Abnormal Labs (Pt was sent from NH as pt as had continued, chronic pain following a fall. All imaging has been negative. Medics stated the pt was sent in to check her sodium level as the MD feels that may be the cause of the contiuned pain.)      ED Diagnosis:   Final diagnoses:   Hyponatremia   Generalized muscle weakness       Code Status: DNR / DNI    Allergies:   Allergies   Allergen Reactions     Sulfa Drugs        Activity level - Baseline/Home:  Stand with Assist    Activity Level - Current:   Stand with Assist of 2     Needed?: No    Isolation: No  Infection: Not Applicable    Bariatric?: No    Vital Signs:   Vitals:    05/02/17 1854 05/02/17 1858 05/02/17 1900 05/02/17 1915   BP:   165/85    Pulse:       Resp: 16 18 21 12   Temp:       TempSrc:       SpO2: 90% 95% 95% 95%   Height:           Cardiac Rhythm: ,        Pain level:      Is this patient confused?: Yes    Patient Report: Initial Complaint: Pt is a 89 year old female with medical hx significant for HTN, Dementia, and RF who presents with left leg pain. Pt has c/o intermittent leg pain and ambulating difficulty x 1 week with negative radiology studies of extremities.The patient lives at [facility] and had labs drawn today at [facility] revealing Na 125 and K+5.3. She was sent to the ED for lab recheck. She denies any hip pain or back pain.  Focused Assessment: Pt is pleasantly confused at times, c/o knee pain, particularly left, +CSM, no other s&s of distress.   Tests Performed: CBC, CMP, EKG, UA  Abnormal Results: Sodium 125  Treatments provided: IV Bolus     Family Comments: Dtr at bedside     OBS brochure/video discussed/provided to patient: N/A    ED Medications:   Medications   0.9% sodium chloride infusion ( Intravenous New Bag 5/2/17 1937)       Drips infusing?:  No      ED NURSE PHONE NUMBER: *[phone]

## 2017-05-03 NOTE — PROGRESS NOTES
05/03/17 1000   Quick Adds   Type of Visit Initial PT Evaluation   Living Environment   Lives With facility resident   Living Arrangements extended care facility   Home Accessibility no concerns   Transportation Available family or friend will provide   Living Environment Comment Patient lives at [facility].   Self-Care   Usual Activity Tolerance moderate   Current Activity Tolerance fair   Regular Exercise yes   Activity/Exercise Type strength training;walking  (Receives home based PT and OT services)   Equipment Currently Used at Home walker, rolling   Activity/Exercise/Self-Care Comment Patient ambulates with a 4WW at baseline.   Functional Level Prior   Ambulation 1-->assistive equipment   Transferring 1-->assistive equipment   Toileting 1-->assistive equipment   Bathing 3-->assistive equipment and person   Dressing 2-->assistive person   Eating 0-->independent   Communication 2-->difficulty speaking (not related to language barrier)   Swallowing 0-->swallows foods/liquids without difficulty   Cognition 1 - attention or memory deficits   Fall history within last six months yes   Number of times patient has fallen within last six months 3   Which of the above functional risks had a recent onset or change? ambulation   Prior Functional Level Comment Patient's daughter reports that, at baseline, patient is able to ambulate and go to bathroom within her room and walks down to dining room at [facility] without assistance, with use of walker.   General Information   Onset of Illness/Injury or Date of Surgery - Date 05/02/17   Referring Physician Nick Pruitt, DO   Patient/Family Goals Statement Patient unable to state PT goal. Per discussion with patient's daughter, she would like patient to return to [facility] upon discharge (vs. discharge to TCU).   Pertinent History of Current Problem (include personal factors and/or comorbidities that impact the POC) Patient is a  89-year-old female with a past medical history significant for chronic kidney disease stage IV, hypertension, GERD and dementia who presents to the Emergency Department with hyponatremia and leg discomfort and weakness following a fall.    Precautions/Limitations fall precautions   General Observations Patient wears neoprene knee brace on right knee.   General Info Comments Activity: up with assist.   Cognitive Status Examination   Orientation person;time   Level of Consciousness alert   Follows Commands and Answers Questions 75% of the time;able to follow single-step instructions   Personal Safety and Judgment impaired   Memory impaired   Cognitive Comment Patient required orientation to place; she thought that she was at [facility] throughout session.   Pain Assessment   Patient Currently in Pain Yes, see Vital Sign flowsheet  (Reported mild pain in knees with ambulation)   Posture    Posture Forward head position;Protracted shoulders   Range of Motion (ROM)   ROM Comment LE AROM within functional limits.   Strength   Strength Comments LE strength grossly 4/5 bilaterally.   Bed Mobility   Bed Mobility Comments Patient transfers supine to sit with HOB flat and bed rail with SBA.   Transfer Skills   Transfer Comments Patient transfers sit <> stand with CGA and FWW.   Gait   Gait Gait Skill;Gait Analysis   Gait Skills   Level of [place]: Gait contact guard   Physical Assist/Nonphysical Assist: Gait supervision;verbal cues;1 person assist   Weight-Bearing Restrictions: Gait full weight-bearing   Assistive Device for Transfer: Gait rolling walker   Gait Distance bed to chair   Gait Analysis   Gait Pattern Used swing-through gait   Gait Deviations Noted decreased roberto;decreased step length;decreased stride length;decreased toe-to-floor clearance;decreased weight-shifting ability   Impairments Contributing to Gait Deviations impaired balance;pain;decreased ROM;decreased strength   Balance   Balance Comments Mild  "instability noted with ambulation.   General Therapy Interventions   Planned Therapy Interventions balance training;bed mobility training;gait training;neuromuscular re-education;ROM;strengthening;stretching;transfer training;home program guidelines;progressive activity/exercise   Clinical Impression   Criteria for Skilled Therapeutic Intervention yes, treatment indicated   PT Diagnosis Gait instability, impaired functional mobility secondary to decreased LE strength   Functional limitations due to impairments Impaired bed mobility, transfers, and ambulation   Clinical Presentation Stable/Uncomplicated   Clinical Presentation Rationale Patient's medical status improving.   Clinical Decision Making (Complexity) Low complexity   Therapy Frequency` daily   Predicted Duration of Therapy Intervention (days/wks) 3 days   Anticipated Equipment Needs at Discharge front wheeled walker   Anticipated Discharge Disposition Home with Assist;Home with Home Therapy   Risk & Benefits of therapy have been explained Yes   Patient, Family & other staff in agreement with plan of care Yes   Clinical Impression Comments Patient would benefit from skilled PT services to address impairments in functional mobility.   [facility] [employer] TM \"6 Clicks\"   2016, Trustees of [facility], under license to [employer].  All rights reserved.   6 Clicks Short Forms Basic Mobility Inpatient Short Form   [facility]  \"6 Clicks\" V.2 Basic Mobility Inpatient Short Form   1. Turning from your back to your side while in a flat bed without using bedrails? 4 - None   2. Moving from lying on your back to sitting on the side of a flat bed without using bedrails? 3 - A Little   3. Moving to and from a bed to a chair (including a wheelchair)? 3 - A Little   4. Standing up from a chair using your arms (e.g., wheelchair, or bedside chair)? 3 - A Little   5. To walk in hospital room? 3 - A Little   6. Climbing 3-5 steps with a railing? 2 - " A Lot   Basic Mobility Raw Score (Score out of 24.Lower scores equate to lower levels of function) 18   Total Evaluation Time   Total Evaluation Time (Minutes) 10

## 2017-05-03 NOTE — PLAN OF CARE
Problem: Goal Outcome Summary  Goal: Goal Outcome Summary     PT: Order received. Evaluation completed; treatment initiated. Patient is a 89-year-old female with a past medical history significant for chronic kidney disease stage IV, hypertension, GERD and dementia admitted with hyponatremia and leg discomfort and weakness following a fall. Patient lives in long term care at [facility]. Patient's daughter reports that, at baseline, patient is able to ambulate and go to bathroom within her room and walks down to dining room at long term care facility without assistance, with use of 4WW.     At present, patient transfers supine to sit with use of bed rail and SBA. She transferred sit <> stand and ambulated 60 feet with CGA and FWW. She reported mild bilateral knee discomfort with ambulation; she ambulates with short steps with limited clearance from floor.      At end of session, called patient's daughter, Mahnaz, to discuss plan of care and discharge disposition. Chantell would prefer for patient to return to [facility] vs. TCU as she notes that her mother's memory and independence with functional mobility have slowly declined over the last several months and she doesn't think that her mother will gain much from intensive rehab. She thinks that patient would be happier returning to [facility] with increased services and resumption of home PT and OT. She provided the phone number for the  at [facility].  is Angi Hayes and her phone number is [phone].     PT recommendation: if facility is able to provide 24 hour care/supervision with physical assist x1 with functional mobility, recommend discharge back to long term care with resumption of home PT and OT. If facility is unable to provide this level of care, recommend discharge to TCU.

## 2017-05-03 NOTE — PHARMACY-ADMISSION MEDICATION HISTORY
Admission medication history interview status for the 5/2/2017  admission is complete. See EPIC admission navigator for prior to admission medications     Medication history source reliability:Good    Actions taken by pharmacist (provider contacted, etc):None     Additional medication history information not noted on PTA med list :None    Medication reconciliation/reorder completed by provider prior to medication history? No    Time spent in this activity: 20min, source NH MAR's    Prior to Admission medications    Medication Sig Last Dose Taking? Auth Provider   dorzolamide (TRUSOPT) 2 % ophthalmic solution Place 1 drop Into the left eye 2 times daily 5/2/2017 at am Yes Unknown, Entered By History   HYDROcodone-acetaminophen (NORCO) 5-325 MG per tablet Take 1 tablet by mouth every 6 hours as needed for moderate to severe pain 5/2/2017 at 0700 Yes Unknown, Entered By History   pantoprazole (PROTONIX) 40 MG EC tablet Take one tab orally twice a day for 8 weeks, then daily indefinitely,  Take on empty stomach about half an hour before eating. 5/2/2017 at am Yes Lainey Fall MD   ferrous gluconate (FERGON) 324 (38 FE) MG tablet Take 1 tablet (324 mg) by mouth 2 times daily 5/2/2017 at am Yes Lainey Fall MD   levothyroxine (SYNTHROID/LEVOTHROID) 50 MCG tablet TAKE 1 TABLET (50 MCG) BY MOUTH DAILY 5/2/2017 at am Yes Maria Eugenia Holley MD   AMLODIPINE BESYLATE PO Take 10 mg by mouth daily 5/2/2017 at 1200 Yes Unknown, Entered By History   metoprolol (LOPRESSOR) 50 MG tablet TAKE 1 AND 1/2 TABLET TWICE DAILY WITH FOOD OR MILK 5/2/2017 at am Yes Monty Lloyd MD   irbesartan (AVAPRO) 300 MG tablet Take 0.5 tablets (150 mg) by mouth 2 times daily 5/2/2017 at am Yes Andrea Bennett MD   Probiotic Product (ADVANCED PROBIOTIC) CAPS Take 1 tablet by mouth daily (with lunch)  5/2/2017 at am Yes Janice Castillo MD   acetaminophen (ACETAMINOPHEN EXTRA STRENGTH) 500 MG tablet Take 500  mg by mouth 3 times daily  5/2/2017 at [id number] Yes Travis Calero, Janice HALEY MD   COMBIGAN 0.2-0.5 % ophthalmic solution Place 1 drop Into the left eye 2 times daily 5/2/2017 at am Yes Reported, Patient   Fexofenadine HCl (ALLEGRA PO) Take 180 mg by mouth daily.  5/2/2017 at am Yes Reported, Patient   travoprost Z, benzalkonium, (TRAVATAN) 0.004 % ophthalmic solution 1 drop. One drop in left eye at bedtime.  5/1/2017 at hs Yes Reported, Patient   Magnesium 500 MG CAPS Take 1 tablet by mouth daily Daughter does not know salt form 5/2/2017 at 1200 Yes Reported, Patient   Calcium Carbonate-Vitamin D (CALCIUM 600 + D OR) Take 1 tablet by mouth daily  5/2/2017 at am Yes Reported, Patient   cephALEXin (KEFLEX) 500 MG capsule Take 2,000 mg by mouth as needed Administer one hour prior to dental procedure prn  Reported, Patient

## 2017-05-03 NOTE — PLAN OF CARE
Problem: Goal Outcome Summary  Goal: Goal Outcome Summary  OT: Orders received, chart reviewed. Per discussion with PT, pt and pt's daughter desire she return to [facility] where she was receiving  OT prior to admit. Per PT, pt's daughter requesting to resume OT on return to LTC and does not want pt to go to a TCU due to diagnosis of dementia and confusion with new environments. Based on chart review, interdisciplinary function, and PLOF, pt has no skilled OT needs during hospitalization. Recommend d/c to LTC with resumption of HH OT, defer OT to . Order complete.

## 2017-05-03 NOTE — PROGRESS NOTES
Care Transition Initial Assessment -   Reason For Consult: discharge planning  Met with: Patient and daughter, Chantell  Active Problems:    Hyponatremia       DATA  Lives With: facility resident  Living Arrangements: [facility]     Identified issues/concerns regarding health management: Patient is a 89 year old female admitted with Hyponatremia from [facility]. Her anticipated D/C date is 5/4. Chantell montgomery patient is on a LTC unit at [facility] and her payment status is MA pending. Her plan is for patient to return to the facility at D/C and she would like a wheelchair ride arranged. Left a message for Radha at [phone] and CARLO Whitley (per PT note) at [phone] to confirm the bed hold.     Quality Of Family Relationships: supportive, involved  Transportation Available: family or friend will provide    ASSESSMENT  Cognitive Status: Oriented to self only, per RN notes  Concerns to be addressed: D/C planning     PLAN  Financial costs for the patient includes: Potential bill for transport. Explained MA typically covers this, but a bill may be sent before MA is approved.  Patient given options and choices for discharge: Yes  Patient/family is agreeable to the plan?  Yes  Patient Goals and Preferences: Back to [facility]  Patient anticipates discharging to: Back to [facility]    SW is following to coordinate the discharge    Addendum  Received a return call from Stephany at [facility] and she reports patient was a resident in their Board and Care. Noted patient may have a skilled need upon her return. Her Inpatient date is 5/2, so would not have Medicare coverage if ready for D/C before 5/5. Stephany asked for a referral to be sent. This was done via the [facility] process.

## 2017-05-03 NOTE — PROGRESS NOTES
[facility]    Hospitalist Progress Note    Date of Service (when I saw the patient): 05/03/2017    Assessment & Plan   Milagros Marie is a 89 year old female with a past medical history significant for chronic kidney disease stage IV, hypertension, GERD and dementia who presents to the Emergency Department with hyponatremia, leg discomfort and weakness following a fall.     Hyponatremia  Na 125 on admit. Na on 3/30 was 132. Unsure of the present duration. Suspected this was due to hypovolemia on admit, though I do note she has some pain, so cannot completely rule out SIADH at this point.   - Treated with NS 75 mL per hour.  Na has improved to 131 today.   - Will reduce her IVF today to 40 ml per hr and continue to follow.   - Urine osmolality, serum osmolality is low normal at 255 and serum osmol is low at 271 and not consistent with clear SIADH.    - Follow Na.      Left leg discomfort following a fall   Deconditioning.   No evidence of fracture. Pain control as needed with judicious use of narcotics.   - PT/OT    Chronic kidney disease stage IV.   Creatinine baseline seems to be around 1.5-1.8.  Cr is 1.9 on admit.   - Has improved with IVF and is 1.67 today.   - IVF as above.     Hypertension.   Will continue with her PTA regimen.     Hypothyroidism.   Will continue with Synthroid.     DVT Prophylaxis: Pneumatic Compression Devices  Code Status: DNR/DNI    Disposition:  Clinically improving. Follow Na.  Possible d/c tomorrow.       [place]       Interval History   Feels well other than feeling tired due to lack of sleep last night.  No new complaints.  Vitals stable and Na improving.     -Data reviewed today: I reviewed all new labs and imaging results over the last 24 hours. I personally reviewed no images or EKG's today.    Physical Exam   Temp: 98.3  F (36.8  C) Temp src: Oral BP: 152/70 Pulse: 68 Heart Rate: 80 Resp: 16 SpO2: 94 % O2 Device: None (Room air) Oxygen Delivery:  2.5 LPM  There were no vitals filed for this visit.  Vital Signs with Ranges  Temp:  [97.7  F (36.5  C)-98.6  F (37  C)] 98.3  F (36.8  C)  Pulse:  [64-90] 68  Heart Rate:  [73-80] 80  Resp:  [12-21] 16  BP: (148-169)/(70-85) 152/70  SpO2:  [89 %-97 %] 94 %  I/O last 3 completed shifts:  In: 100 [I.V.:100]  Out: 1600 [Urine:1600]    Gen: Patient in no acute distress.  Appears comfortable.  Heart:  S1S2+, regular rate and rhythm, No murmurs.  Lungs:  Clear to auscultation, no wheezing, no rales.   Abdomen:  Soft, non tender, non distended, bowel sounds positive.  Extremities:  No edema.    Medications     NaCl 75 mL/hr at 05/03/17 0644       acetaminophen  500 mg Oral TID     amLODIPine (NORVASC) tablet 10 mg  10 mg Oral Daily     brimonidine-timolol  1 drop Left Eye BID     dorzolamide  1 drop Left Eye BID     irbesartan  150 mg Oral BID     metoprolol  75 mg Oral BID     pantoprazole  40 mg Oral BID AC     travoprost (MARY Free)  1 drop Left Eye At Bedtime       Data     Recent Labs  Lab 05/03/17  0943 05/03/17  0030 05/02/17  1820   WBC 6.9  --  7.9   HGB 8.9*  --  9.8*   MCV 93  --  91     --  405   * 127* 125*   POTASSIUM 4.6  --  5.3   CHLORIDE 100  --  94   CO2 23  --  23   BUN 33*  --  39*   CR 1.67*  --  1.90*   ANIONGAP 8  --  8   SAMSON 8.6  --  8.9   *  --  102*   ALBUMIN  --   --  3.6   PROTTOTAL  --   --  7.5   BILITOTAL  --   --  0.4   ALKPHOS  --   --  147   ALT  --   --  23   AST  --   --  19       No results found for this or any previous visit (from the past 24 hour(s)).

## 2017-05-03 NOTE — PROGRESS NOTES
[facility]  Progress Note  [facility]    Visit per pt request.  Pt was sitting up in chair when I came to room.  I introduced myself as the  and she seemed surprised.  Not sure if she was confused.  I asked her if there was anything I could do for her.  She responded no.  When I asked her about saying a prayer she responded that one can never get enough prayer.   We shared prayer and I let her know that SH is available for her during her stay.      Therese Mann  Chaplain Resident  Pager [phone]

## 2017-05-03 NOTE — PLAN OF CARE
Problem: Goal Outcome Summary  Goal: Goal Outcome Summary  Outcome: Improving  Na 131 this am and at noon 129; next Na will be drawn at 6pm.  NS @ 40/hr.  Patient is alert and oriented/forgetful.  Up with assist of 1/GB/walker in room/bathroom and [place].  She is continent.  Good intake.  Pain to right knee reported less so with ambulation after scheduled tylenol.  Possible discharge 1-2 days back to NH.

## 2017-05-03 NOTE — PLAN OF CARE
Problem: Goal Outcome Summary  Goal: Goal Outcome Summary  Outcome: No Change  Pt is A/O to self.  Forgetful.  Up with A1 to the commode.  Reg diet.  Hard of hearing.  PIV infusing NS at 75 ml/hr.  Pain in the L knee with certain movements.  Denies pain at rest.  Continue to monitor.

## 2017-05-03 NOTE — PLAN OF CARE
Problem: Goal Outcome Summary  Goal: Goal Outcome Summary  Outcome: Improving  Pt Oriented to self only. Forgetful. VSS on 1L NC. Up to commode with 1. Brace on Rt knee. Denies pain at this time. Na 125. NS running at 75/hr. Will continue to monitor.

## 2017-05-03 NOTE — H&P
PRIMARY CARE PHYSICIAN:  Maria Eugenia Holley M.D..      CODE STATUS:  DNR/DNI.      CHIEF COMPLAINT:  Fall with weakness.      HISTORY OF PRESENT ILLNESS:  Ms. Milagros Marie is a 89-year-old female with a past medical history significant for dementia, chronic kidney disease, chronic anemia and GERD who presents to the Emergency Department with the above concerns.  History is obtained through discussion with the ED physician, the patient and the patient's daughter at bedside.  The patient fell about 8 days ago and had imaging including the pelvis, hip and knee at a care facility which was negative.  She, however, has ongoing left leg discomfort and has been unable to bear as much weight.  She was getting increasingly weaker and so was sent to the ER for evaluation today.  The patient denies any chest discomfort or shortness of breath but is on a little bit of oxygen.  Her medications are unchanged.  She does not have any recent illnesses.  She has been eating and drinking fairly well.  Bowel and urine habits about the same.      In the Emergency Department, the patient was noted to be hyponatremic with a sodium of 125.  Creatinine was a little bit higher than baseline at 1.9.  UA is bland.  The patient was started on normal saline with admission requested to the Hospitalist Service.  The daughter notes that the patient is in the long-term care part of [facility] but has recently had some physical and occupational therapy.  The patient denies any other concerns at this point.      PAST MEDICAL HISTORY:   1.  Chronic kidney disease, stage IV, with a baseline creatinine of 1.5-1.7.   2.  Hypothyroidism.   3.  Hypertension.   4.  Glaucoma.   5.  GERD.   6.  Osteopenia.   7.  Dementia.   8.  Homocystinemia.   9.  DJD.      MEDICATIONS:    Prior to Admission medications    Medication Sig Last Dose Taking? Auth Provider   Magnesium 500 MG CAPS Take 1 tablet by mouth every other day Daughter does not know salt form  Yes  Skinny Espana MD   dorzolamide (TRUSOPT) 2 % ophthalmic solution Place 1 drop Into the left eye 2 times daily 5/2/2017 at am Yes Unknown, Entered By History   pantoprazole (PROTONIX) 40 MG EC tablet Take one tab orally twice a day for 8 weeks, then daily indefinitely,  Take on empty stomach about half an hour before eating. 5/2/2017 at am Yes Lainey Fall MD   ferrous gluconate (FERGON) 324 (38 FE) MG tablet Take 1 tablet (324 mg) by mouth 2 times daily 5/2/2017 at am Yes Lainey Fall MD   levothyroxine (SYNTHROID/LEVOTHROID) 50 MCG tablet TAKE 1 TABLET (50 MCG) BY MOUTH DAILY 5/2/2017 at am Yes Maria Eugenia Holley MD   AMLODIPINE BESYLATE PO Take 10 mg by mouth daily 5/2/2017 at 1200 Yes Unknown, Entered By History   metoprolol (LOPRESSOR) 50 MG tablet TAKE 1 AND 1/2 TABLET TWICE DAILY WITH FOOD OR MILK 5/2/2017 at am Yes Monty Lloyd MD   irbesartan (AVAPRO) 300 MG tablet Take 0.5 tablets (150 mg) by mouth 2 times daily 5/2/2017 at am Yes Andrea Bennett MD   Probiotic Product (ADVANCED PROBIOTIC) CAPS Take 1 tablet by mouth daily (with lunch)  5/2/2017 at am Yes Janice Castillo MD   acetaminophen (ACETAMINOPHEN EXTRA STRENGTH) 500 MG tablet Take 500 mg by mouth 3 times daily  5/2/2017 at b8hexie Yes Janice Castillo MD   COMBIGAN 0.2-0.5 % ophthalmic solution Place 1 drop Into the left eye 2 times daily 5/2/2017 at am Yes Reported, Patient   Fexofenadine HCl (ALLEGRA PO) Take 180 mg by mouth daily.  5/2/2017 at am Yes Reported, Patient   travoprost Z, benzalkonium, (TRAVATAN) 0.004 % ophthalmic solution 1 drop. One drop in left eye at bedtime.  5/1/2017 at hs Yes Reported, Patient   Calcium Carbonate-Vitamin D (CALCIUM 600 + D OR) Take 1 tablet by mouth daily  5/2/2017 at am Yes Reported, Patient   cephALEXin (KEFLEX) 500 MG capsule Take 2,000 mg by mouth as needed Administer one hour prior to dental procedure prn  Reported, Patient            SOCIAL HISTORY:  The patient resides at [place] and denies any use of tobacco or alcohol.      FAMILY HISTORY:  Both of her parents had heart disease and sister had colorectal cancer.      ALLERGIES:  Sulfa and ACE inhibitor.      REVIEW OF SYSTEMS:  The complete review of systems was reviewed and negative, save for the pertinent positives recorded in the HPI.      PHYSICAL EXAMINATION:   VITAL SIGNS:  Show blood pressure 165/85, heart rate 70, respirations 12, satting 95% on 2 liters nasal cannula with a temperature of 98.6 degrees Fahrenheit.   GENERAL:  The patient is lying in bed and resting comfortably.   HEENT:  Pupils equal, round, reactive to light, extraocular muscle function intact.  No scleral icterus.  Oropharynx is clear.   NECK:  No lymphadenopathy or thyromegaly.   CARDIOVASCULAR:  Heart is regular rate and rhythm without any murmur, rub or gallop.   PULMONARY:  Lungs are clear to auscultation bilaterally without wheeze or rhonchi.   GASTROINTESTINAL:  Positive bowel sounds, soft, nontender and nondistended.   SKIN:  No rash or lesion.   LYMPHATICS:  No peripheral edema.   PSYCHIATRIC:  Alert and oriented x3, normal affect.   NEUROLOGIC:  Cranial nerves II-XII are grossly intact without any focal deficits.   MUSCULOSKELETAL:  She has a right knee brace on.  Minimal pain with range of motion or palpation.      LABORATORY DATA:  WBC count 7.9, hgb 9.8; plt 405.  Sodium 125, potassium 5.3, chloride 94, CO2 23, BUN 39, creatinine 1.90, unremarkable LFTs.      EKG:  I personally reviewed her EKG which shows sinus rhythm with first degree AV block.      ASSESSMENT AND PLAN:  Ms. Marie is a 89-year-old female with a past medical history significant for chronic kidney disease stage IV, hypertension, GERD and dementia who presents to the Emergency Department with hyponatremia and leg discomfort and weakness following a fall.   1.  Hyponatremia.  Unsure of the duration of this, so will plan to correct  slowly.  This is mild and she should not have any issues.  I suspect this is due to hypovolemia, though I do note she has some pain, so cannot completely rule out SIADH at this point.  Will continue with normal saline at 75 mL per hour and plan to obtain a urine osmolality, serum osmolality and recheck sodium in about 4 hours.   2.  Left leg discomfort following a fall with deconditioning.  No evidence of fracture.  Pain control as needed with judicious use of narcotics.  Physical and occupational therapy will be consulted.   3.  Hypertension.  Will continue with her PTA regimen once confirmed.   4.  Hypothyroidism.  Will continue with Synthroid.   5.  Chronic kidney disease stage IV.  Creatinine is just a bit above baseline.  We will recheck in the morning with IV fluid hydration.   6.  Gastroesophageal reflux disease.  Will continue with PPI.   7.  Deep venous thrombosis prophylaxis.  SCDs.   8.  Disposition.  Given her age and hyponatremia that I want to correct slowly, I am expecting her to be here at least 2 midnights so she will be brought in as an inpatient.         MANPREET MAYNARD DO             D: 2017 20:05   T: 2017 20:47   MT: [id number]      Name:     CHELLY RENAE   MRN:      [id number]        Account:      [id number]   :      1923           Admitted:     [id number]      Document: [id number]       cc: Maria Eugenia Holley MD

## 2017-05-04 LAB
ANION GAP SERPL CALCULATED.3IONS-SCNC: 8 MMOL/L (ref 3–14)
BUN SERPL-MCNC: 35 MG/DL (ref 7–30)
CALCIUM SERPL-MCNC: 8.4 MG/DL (ref 8.5–10.1)
CHLORIDE SERPL-SCNC: 100 MMOL/L (ref 94–109)
CO2 SERPL-SCNC: 22 MMOL/L (ref 20–32)
CREAT SERPL-MCNC: 1.58 MG/DL (ref 0.52–1.04)
GFR SERPL CREATININE-BSD FRML MDRD: 30 ML/MIN/1.7M2
GLUCOSE SERPL-MCNC: 95 MG/DL (ref 70–99)
MAGNESIUM SERPL-MCNC: 2.4 MG/DL (ref 1.6–2.3)
POTASSIUM SERPL-SCNC: 4.7 MMOL/L (ref 3.4–5.3)
SODIUM SERPL-SCNC: 128 MMOL/L (ref 133–144)
SODIUM SERPL-SCNC: 130 MMOL/L (ref 133–144)
T4 FREE SERPL-MCNC: 1.01 NG/DL (ref 0.76–1.46)
TSH SERPL DL<=0.005 MIU/L-ACNC: 8.58 MU/L (ref 0.4–4)

## 2017-05-04 PROCEDURE — 80048 BASIC METABOLIC PNL TOTAL CA: CPT | Performed by: INTERNAL MEDICINE

## 2017-05-04 PROCEDURE — 83735 ASSAY OF MAGNESIUM: CPT | Performed by: INTERNAL MEDICINE

## 2017-05-04 PROCEDURE — 84295 ASSAY OF SERUM SODIUM: CPT | Performed by: INTERNAL MEDICINE

## 2017-05-04 PROCEDURE — 25000132 ZZH RX MED GY IP 250 OP 250 PS 637: Mod: GY | Performed by: INTERNAL MEDICINE

## 2017-05-04 PROCEDURE — 12000000 ZZH R&B MED SURG/OB

## 2017-05-04 PROCEDURE — 25000128 H RX IP 250 OP 636: Performed by: INTERNAL MEDICINE

## 2017-05-04 PROCEDURE — A9270 NON-COVERED ITEM OR SERVICE: HCPCS | Mod: GY | Performed by: INTERNAL MEDICINE

## 2017-05-04 PROCEDURE — 99232 SBSQ HOSP IP/OBS MODERATE 35: CPT | Performed by: INTERNAL MEDICINE

## 2017-05-04 PROCEDURE — 36415 COLL VENOUS BLD VENIPUNCTURE: CPT | Performed by: INTERNAL MEDICINE

## 2017-05-04 PROCEDURE — 84439 ASSAY OF FREE THYROXINE: CPT | Performed by: INTERNAL MEDICINE

## 2017-05-04 PROCEDURE — 84443 ASSAY THYROID STIM HORMONE: CPT | Performed by: INTERNAL MEDICINE

## 2017-05-04 RX ADMIN — IRBESARTAN 150 MG: 150 TABLET ORAL at 08:39

## 2017-05-04 RX ADMIN — METOPROLOL TARTRATE 75 MG: 50 TABLET, FILM COATED ORAL at 08:38

## 2017-05-04 RX ADMIN — DORZOLAMIDE HYDROCHLORIDE 1 DROP: 20 SOLUTION/ DROPS OPHTHALMIC at 20:57

## 2017-05-04 RX ADMIN — BRIMONIDINE TARTRATE, TIMOLOL MALEATE 1 DROP: 2; 5 SOLUTION/ DROPS OPHTHALMIC at 08:41

## 2017-05-04 RX ADMIN — IRBESARTAN 150 MG: 150 TABLET ORAL at 16:56

## 2017-05-04 RX ADMIN — SODIUM CHLORIDE: 9 INJECTION, SOLUTION INTRAVENOUS at 02:49

## 2017-05-04 RX ADMIN — AMLODIPINE BESYLATE 10 MG: 10 TABLET ORAL at 08:39

## 2017-05-04 RX ADMIN — BRIMONIDINE TARTRATE, TIMOLOL MALEATE 1 DROP: 2; 5 SOLUTION/ DROPS OPHTHALMIC at 20:46

## 2017-05-04 RX ADMIN — ACETAMINOPHEN 500 MG: 500 TABLET, FILM COATED ORAL at 08:39

## 2017-05-04 RX ADMIN — ACETAMINOPHEN 500 MG: 500 TABLET, FILM COATED ORAL at 16:56

## 2017-05-04 RX ADMIN — DORZOLAMIDE HYDROCHLORIDE 1 DROP: 20 SOLUTION/ DROPS OPHTHALMIC at 08:41

## 2017-05-04 RX ADMIN — ACETAMINOPHEN 500 MG: 500 TABLET, FILM COATED ORAL at 21:11

## 2017-05-04 RX ADMIN — PANTOPRAZOLE SODIUM 40 MG: 40 TABLET, DELAYED RELEASE ORAL at 16:56

## 2017-05-04 RX ADMIN — PANTOPRAZOLE SODIUM 40 MG: 40 TABLET, DELAYED RELEASE ORAL at 06:41

## 2017-05-04 RX ADMIN — METOPROLOL TARTRATE 75 MG: 50 TABLET, FILM COATED ORAL at 20:56

## 2017-05-04 RX ADMIN — TRAVOPROST 1 DROP: 0.04 SOLUTION/ DROPS OPHTHALMIC at 21:11

## 2017-05-04 NOTE — PROGRESS NOTES
D: SW following for DC planning.   I: Pt has been accepted at [facility].  P: SW will follow.     KLAUS Darnell  [id number]    Addendum: Spoke with pt and dtr Chantell about the referral to [facility]. She is agreeable. Chantell had concerns about her mother paying for both rooms at [facility]. Explained 3-day stay requirement and Medicare coverage for TCU. Anticipate DC Friday which will be pts third day. Chantell requests transportation. CARLO explained a WC ride is private pay.

## 2017-05-04 NOTE — PROGRESS NOTES
[facility]    Hospitalist Progress Note    Date of Service (when I saw the patient): 05/04/2017    Assessment & Plan   Milagros Marie is a 89 year old female with a past medical history significant for chronic kidney disease stage IV, hypertension, GERD and dementia who presents to the Emergency Department with hyponatremia, leg discomfort and weakness following a fall.      Hyponatremia  Na 125 on admit. Na on 3/30 was 132. Unsure of the present duration. Suspected this was due to hypovolemia on admit, though I do note she has some pain, so cannot completely rule out SIADH at this point.   - Treated with NS 75 mL per hour. Na improved to 131 HD # 1 and fluids reduced to 40 ml /hr.    - Na 130 on 5/4.  IVF stopped.   - Urine osmolality, serum osmolality is low normal at 255 and serum osmol is low at 271 and not consistent with clear SIADH.   - Follow Na in am.        Left leg discomfort following a fall   Deconditioning.   No evidence of fracture. Pain control as needed with judicious use of narcotics.   - PT/OT     Chronic kidney disease stage IV.   Creatinine baseline seems to be around 1.5-1.8. Cr is 1.9 on admit.   - Has improved with IVF and is 1.58 today.   - IVF stopped.       Hypertension.   Will continue with her PTA regimen.      Hypothyroidism.   Will continue with Synthroid.   - TSH 8.58 but FT4 normal.    - Will need to repeat TFT's in 6-8 weeks with pcp.      DVT Prophylaxis: Pneumatic Compression Devices  Code Status: DNR/DNI     Disposition: Stop IVF and follow Na in am.  Will plan on d/c tomorrow to TCU if Na stable.  D/w her daughter at bedside today.       Skinny Espana       Interval History   No acute events overnight.  Vitals stable. Na 130 today and pt feels well.     -Data reviewed today: I reviewed all new labs and imaging results over the last 24 hours. I personally reviewed no images or EKG's today.    Physical Exam   Temp: 97.6  F (36.4  C) Temp src: Oral BP:  142/73 Pulse: 71 Heart Rate: 75 Resp: 16 SpO2: 93 % O2 Device: None (Room air)    There were no vitals filed for this visit.  Vital Signs with Ranges  Temp:  [97.6  F (36.4  C)-98.2  F (36.8  C)] 97.6  F (36.4  C)  Pulse:  [70-71] 71  Heart Rate:  [66-75] 75  Resp:  [16] 16  BP: (135-147)/(66-73) 142/73  SpO2:  [93 %-94 %] 93 %  I/O last 3 completed shifts:  In: 2405 [P.O.:1020; I.V.:1385]  Out: 1025 [Urine:1025]    Gen: Patient in no acute distress.  Appears comfortable.  Heart:  S1S2+, regular rate and rhythm, No murmurs.  Lungs:  Clear to auscultation at apices, no wheezing, faint rales at left base.    Abdomen:  Soft, non tender, non distended, bowel sounds positive.  Extremities: Trace LE edema.    Medications     NaCl 40 mL/hr at 05/04/17 0249       acetaminophen  500 mg Oral TID     amLODIPine (NORVASC) tablet 10 mg  10 mg Oral Daily     brimonidine-timolol  1 drop Left Eye BID     dorzolamide  1 drop Left Eye BID     irbesartan  150 mg Oral BID     metoprolol  75 mg Oral BID     pantoprazole  40 mg Oral BID AC     travoprost (MARY Free)  1 drop Left Eye At Bedtime       Data     Recent Labs  Lab 05/04/17  0630 05/04/17  0010 05/03/17  1805  05/03/17  0943  05/02/17  1820   WBC  --   --   --   --  6.9  --  7.9   HGB  --   --   --   --  8.9*  --  9.8*   MCV  --   --   --   --  93  --  91   PLT  --   --   --   --  396  --  405   * 128* 128*  < > 131*  < > 125*   POTASSIUM 4.7  --   --   --  4.6  --  5.3   CHLORIDE 100  --   --   --  100  --  94   CO2 22  --   --   --  23  --  23   BUN 35*  --   --   --  33*  --  39*   CR 1.58*  --   --   --  1.67*  --  1.90*   ANIONGAP 8  --   --   --  8  --  8   SAMSON 8.4*  --   --   --  8.6  --  8.9   GLC 95  --   --   --  113*  --  102*   ALBUMIN  --   --   --   --   --   --  3.6   PROTTOTAL  --   --   --   --   --   --  7.5   BILITOTAL  --   --   --   --   --   --  0.4   ALKPHOS  --   --   --   --   --   --  147   ALT  --   --   --   --   --   --  23   AST  --   --   --    --   --   --  19   < > = values in this interval not displayed.    No results found for this or any previous visit (from the past 24 hour(s)).

## 2017-05-04 NOTE — PROVIDER NOTIFICATION
Paged on call MD regarding Sodium 128.     MD Cao response to leave fluids at 40 ml/hr.     Will continue to monitor.

## 2017-05-04 NOTE — PLAN OF CARE
Problem: Goal Outcome Summary  Goal: Goal Outcome Summary  Outcome: Improving  Pt A&O but forgetful and hard of hearing. VSS on RA. Up with 1 assist to BR with GB/walker. Tolerating Regular diet. NS at 40ml/hr. Sodium 128 at 1800. MD notified; no changes made. Will continue to monitor. [place] [facility] [place]

## 2017-05-04 NOTE — PLAN OF CARE
Problem: Goal Outcome Summary  Goal: Goal Outcome Summary  Outcome: No Change  Pt alert/oriented x3, forgetful, up w/ 1/ walker/ gait belt to bathroom. VSS on room air. Na+130, recheck labs in AM and possibly d/c to TCU tomorrow, IVF d/c. Pt c/o of L knee pain, applied hot pack, scheduled tylenol. Up to chair for meals/ good appetite. D/c most likely tomorrow

## 2017-05-04 NOTE — PLAN OF CARE
Problem: Goal Outcome Summary  Goal: Goal Outcome Summary  Outcome: No Change  Pt AOx3, disoriented to situation. VSS on RA. Denies pain. IVF at 40 ml/hr. Sodium 128 at 0000, stable from previous. Continue with q6h checks. Up 1 assist with walker, gaitbelt. Plan for possible d/c today pending progress. Will continue to monitor.

## 2017-05-05 LAB
ANION GAP SERPL CALCULATED.3IONS-SCNC: 9 MMOL/L (ref 3–14)
BUN SERPL-MCNC: 33 MG/DL (ref 7–30)
CALCIUM SERPL-MCNC: 8.9 MG/DL (ref 8.5–10.1)
CHLORIDE SERPL-SCNC: 99 MMOL/L (ref 94–109)
CO2 SERPL-SCNC: 21 MMOL/L (ref 20–32)
CREAT SERPL-MCNC: 1.69 MG/DL (ref 0.52–1.04)
GFR SERPL CREATININE-BSD FRML MDRD: 28 ML/MIN/1.7M2
GLUCOSE SERPL-MCNC: 122 MG/DL (ref 70–99)
POTASSIUM SERPL-SCNC: 4.9 MMOL/L (ref 3.4–5.3)
SODIUM SERPL-SCNC: 129 MMOL/L (ref 133–144)

## 2017-05-05 PROCEDURE — 25000132 ZZH RX MED GY IP 250 OP 250 PS 637: Mod: GY | Performed by: INTERNAL MEDICINE

## 2017-05-05 PROCEDURE — A9270 NON-COVERED ITEM OR SERVICE: HCPCS | Mod: GY | Performed by: INTERNAL MEDICINE

## 2017-05-05 PROCEDURE — 36415 COLL VENOUS BLD VENIPUNCTURE: CPT | Performed by: INTERNAL MEDICINE

## 2017-05-05 PROCEDURE — 99232 SBSQ HOSP IP/OBS MODERATE 35: CPT | Performed by: INTERNAL MEDICINE

## 2017-05-05 PROCEDURE — 12000000 ZZH R&B MED SURG/OB

## 2017-05-05 PROCEDURE — 80048 BASIC METABOLIC PNL TOTAL CA: CPT | Performed by: INTERNAL MEDICINE

## 2017-05-05 PROCEDURE — 99207 ZZC CDG-MDM COMPONENT: MEETS LOW - DOWN CODED: CPT | Performed by: INTERNAL MEDICINE

## 2017-05-05 RX ADMIN — BRIMONIDINE TARTRATE, TIMOLOL MALEATE 1 DROP: 2; 5 SOLUTION/ DROPS OPHTHALMIC at 20:07

## 2017-05-05 RX ADMIN — DORZOLAMIDE HYDROCHLORIDE 1 DROP: 20 SOLUTION/ DROPS OPHTHALMIC at 20:22

## 2017-05-05 RX ADMIN — ACETAMINOPHEN 500 MG: 500 TABLET, FILM COATED ORAL at 20:23

## 2017-05-05 RX ADMIN — DORZOLAMIDE HYDROCHLORIDE 1 DROP: 20 SOLUTION/ DROPS OPHTHALMIC at 08:46

## 2017-05-05 RX ADMIN — IRBESARTAN 150 MG: 150 TABLET ORAL at 16:48

## 2017-05-05 RX ADMIN — METOPROLOL TARTRATE 75 MG: 50 TABLET, FILM COATED ORAL at 08:37

## 2017-05-05 RX ADMIN — ACETAMINOPHEN 500 MG: 500 TABLET, FILM COATED ORAL at 08:37

## 2017-05-05 RX ADMIN — TRAVOPROST 1 DROP: 0.04 SOLUTION/ DROPS OPHTHALMIC at 21:13

## 2017-05-05 RX ADMIN — AMLODIPINE BESYLATE 10 MG: 10 TABLET ORAL at 08:37

## 2017-05-05 RX ADMIN — IRBESARTAN 150 MG: 150 TABLET ORAL at 08:37

## 2017-05-05 RX ADMIN — ACETAMINOPHEN 500 MG: 500 TABLET, FILM COATED ORAL at 16:48

## 2017-05-05 RX ADMIN — PANTOPRAZOLE SODIUM 40 MG: 40 TABLET, DELAYED RELEASE ORAL at 16:48

## 2017-05-05 RX ADMIN — PANTOPRAZOLE SODIUM 40 MG: 40 TABLET, DELAYED RELEASE ORAL at 06:45

## 2017-05-05 RX ADMIN — METOPROLOL TARTRATE 75 MG: 50 TABLET, FILM COATED ORAL at 20:22

## 2017-05-05 RX ADMIN — BRIMONIDINE TARTRATE, TIMOLOL MALEATE 1 DROP: 2; 5 SOLUTION/ DROPS OPHTHALMIC at 08:46

## 2017-05-05 NOTE — PLAN OF CARE
Problem: Goal Outcome Summary  Goal: Goal Outcome Summary  Outcome: Improving  A&O, forgetful and uses call light inconsistently. VSS on RA. Taking scheduled tylenol for left knee pain. Tolerating regular diet. Ambulated to bathroom with with assist of 1 and walker x3. Plan to discharge to TCU today if Na stable.

## 2017-05-05 NOTE — PLAN OF CARE
Problem: Goal Outcome Summary  Goal: Goal Outcome Summary  PT: Patient eating breakfast at attempt for PT session.

## 2017-05-05 NOTE — PLAN OF CARE
Problem: Goal Outcome Summary  Goal: Goal Outcome Summary  Outcome: Improving  A&O, forgetful and uses call light inconsistently. VSS.  Taking scheduled tylenol for left knee pain. Tolerating regular diet. Ambulated to bathroom with with assist of 1 and walker x3.  Plan:Discharge to TCU tomorrow if Na stable.

## 2017-05-05 NOTE — PROGRESS NOTES
[facility]    Hospitalist Progress Note    Date of Service (when I saw the patient): 05/05/2017    Assessment & Plan   Milagros Marie is a 89 year old female with a past medical history significant for chronic kidney disease stage IV, hypertension, GERD and dementia who presents to the Emergency Department with hyponatremia, leg discomfort and weakness following a fall.      Hyponatremia  Na 125 on admit. Na on 3/30 was 132. Unsure of the present duration. Suspected this was due to hypovolemia on admit, though I do note she has some pain, so cannot completely rule out SIADH at this point. Has had Na levels as low as 125 in the past and felt to be related to her drinking too much water per clinic notes.    - Treated with NS 75 mL per hour. Na improved to 131 HD # 1.   - Na 130 on 5/4.  IVF stopped.   - Urine osmolality is low normal at 255 and serum osmol is low at 271 and not consistent with clear SIADH.   - Na 129 5/5.  Placed on 1500 ml fluid restriction as po intake not appropriately documented previously.   - Follow Na in am.       Left leg discomfort following a fall   Deconditioning.   No evidence of fracture. Pain control as needed with judicious use of narcotics.   - PT/OT     Chronic kidney disease stage IV.   Creatinine baseline seems to be around 1.5-1.8. Cr is 1.9 on admit.   - Cr 1.69 today.   - BMP in am.      Hypertension.   Will continue with her PTA regimen.      Hypothyroidism.   Will continue with Synthroid.   - TSH 8.58 but FT4 normal.    - Will need to repeat TFT's in 6-8 weeks with pcp.      DVT Prophylaxis: Pneumatic Compression Devices  Code Status: DNR/DNI     Disposition: Follow Na in am. Fluid restrict to 1500 ml per day. Possibly another day. Will need TCU.       Skinny Espana       Interval History   No acute events overnight.  Vitals stable. Na 129 today.  Fluid restriction placed today.     -Data reviewed today: I reviewed all new labs and imaging results  over the last 24 hours. I personally reviewed no images or EKG's today.    Physical Exam   Temp: 98.3  F (36.8  C) Temp src: Oral BP: 166/77 Pulse: 71 Heart Rate: 67 Resp: 16 SpO2: 97 % O2 Device: None (Room air)    Vitals:    05/05/17 0700   Weight: 61.2 kg (134 lb 14.7 oz)     Vital Signs with Ranges  Temp:  [97.2  F (36.2  C)-98.5  F (36.9  C)] 98.3  F (36.8  C)  Pulse:  [71] 71  Heart Rate:  [65-80] 67  Resp:  [16] 16  BP: (132-166)/(55-77) 166/77  SpO2:  [96 %-97 %] 97 %  I/O last 3 completed shifts:  In: 240 [P.O.:240]  Out: 1200 [Urine:1200]    Gen: Patient in no acute distress.  Appears comfortable.  Heart:  S1S2+, regular rate and rhythm, No murmurs.  Lungs:  Clear to auscultation at apices, no wheezing, faint rales at left base.    Abdomen:  Soft, non tender, non distended, bowel sounds positive.  Extremities: Trace LE edema.    Medications        acetaminophen  500 mg Oral TID     amLODIPine (NORVASC) tablet 10 mg  10 mg Oral Daily     brimonidine-timolol  1 drop Left Eye BID     dorzolamide  1 drop Left Eye BID     irbesartan  150 mg Oral BID     metoprolol  75 mg Oral BID     pantoprazole  40 mg Oral BID AC     travoprost (MARY Free)  1 drop Left Eye At Bedtime       Data     Recent Labs  Lab 05/05/17  0849 05/04/17  0630 05/04/17  0010  05/03/17  0943  05/02/17  1820   WBC  --   --   --   --  6.9  --  7.9   HGB  --   --   --   --  8.9*  --  9.8*   MCV  --   --   --   --  93  --  91   PLT  --   --   --   --  396  --  405   * 130* 128*  < > 131*  < > 125*   POTASSIUM 4.9 4.7  --   --  4.6  --  5.3   CHLORIDE 99 100  --   --  100  --  94   CO2 21 22  --   --  23  --  23   BUN 33* 35*  --   --  33*  --  39*   CR 1.69* 1.58*  --   --  1.67*  --  1.90*   ANIONGAP 9 8  --   --  8  --  8   SAMSON 8.9 8.4*  --   --  8.6  --  8.9   * 95  --   --  113*  --  102*   ALBUMIN  --   --   --   --   --   --  3.6   PROTTOTAL  --   --   --   --   --   --  7.5   BILITOTAL  --   --   --   --   --   --  0.4    ALKPHOS  --   --   --   --   --   --  147   ALT  --   --   --   --   --   --  23   AST  --   --   --   --   --   --  19   < > = values in this interval not displayed.    No results found for this or any previous visit (from the past 24 hour(s)).

## 2017-05-05 NOTE — PROGRESS NOTES
D: CARLO following for DC planning. Anticipate DC Saturday.   I: CARLO updated Stephany at [facility]. Saturday SW should call [phone] when pt is ready. HE WC ride set for 1330.  P: SW will follow.     KLAUS Darnell  [id number]    PAS-RR    D: Per DHS regulation, SW completed and submitted PAS-RR to [place] [employer] via the [employer].  PAS-RR confirmation # is : [id number]    I: SW spoke with dtr and they are aware a PAS-RR has been submitted.  SW reviewed with dtr that they may be contacted for a follow up appointment within 10 days of hospital discharge if their SNF stay is < 30 days.  Contact information for [employer] was also provided.    A: Dtr verbalized understanding.    P: Further questions may be directed to [employer] at #[phone], option #4 for PAS-RR staff.

## 2017-05-06 ENCOUNTER — APPOINTMENT (OUTPATIENT)
Dept: PHYSICAL THERAPY | Facility: CLINIC | Age: 82
DRG: 641 | End: 2017-05-06
Payer: MEDICARE

## 2017-05-06 VITALS
DIASTOLIC BLOOD PRESSURE: 72 MMHG | SYSTOLIC BLOOD PRESSURE: 155 MMHG | RESPIRATION RATE: 16 BRPM | HEART RATE: 82 BPM | BODY MASS INDEX: 23.87 KG/M2 | WEIGHT: 134.7 LBS | OXYGEN SATURATION: 95 % | HEIGHT: 63 IN | TEMPERATURE: 98.3 F

## 2017-05-06 LAB
ANION GAP SERPL CALCULATED.3IONS-SCNC: 11 MMOL/L (ref 3–14)
BUN SERPL-MCNC: 35 MG/DL (ref 7–30)
CALCIUM SERPL-MCNC: 8.4 MG/DL (ref 8.5–10.1)
CHLORIDE SERPL-SCNC: 102 MMOL/L (ref 94–109)
CO2 SERPL-SCNC: 21 MMOL/L (ref 20–32)
CREAT SERPL-MCNC: 1.73 MG/DL (ref 0.52–1.04)
GFR SERPL CREATININE-BSD FRML MDRD: 27 ML/MIN/1.7M2
GLUCOSE SERPL-MCNC: 91 MG/DL (ref 70–99)
POTASSIUM SERPL-SCNC: 4.5 MMOL/L (ref 3.4–5.3)
SODIUM SERPL-SCNC: 134 MMOL/L (ref 133–144)

## 2017-05-06 PROCEDURE — 97116 GAIT TRAINING THERAPY: CPT | Mod: GP

## 2017-05-06 PROCEDURE — 25000132 ZZH RX MED GY IP 250 OP 250 PS 637: Mod: GY | Performed by: INTERNAL MEDICINE

## 2017-05-06 PROCEDURE — 40000193 ZZH STATISTIC PT WARD VISIT

## 2017-05-06 PROCEDURE — A9270 NON-COVERED ITEM OR SERVICE: HCPCS | Mod: GY | Performed by: INTERNAL MEDICINE

## 2017-05-06 PROCEDURE — 97110 THERAPEUTIC EXERCISES: CPT | Mod: GP

## 2017-05-06 PROCEDURE — 36415 COLL VENOUS BLD VENIPUNCTURE: CPT | Performed by: INTERNAL MEDICINE

## 2017-05-06 PROCEDURE — 80048 BASIC METABOLIC PNL TOTAL CA: CPT | Performed by: INTERNAL MEDICINE

## 2017-05-06 PROCEDURE — 99239 HOSP IP/OBS DSCHRG MGMT >30: CPT | Performed by: INTERNAL MEDICINE

## 2017-05-06 RX ORDER — FOLIC ACID 0.8 MG
1 TABLET ORAL EVERY OTHER DAY
Qty: 30 CAPSULE | Refills: 3 | DISCHARGE
Start: 2017-05-06 | End: 2017-06-13 | Stop reason: DRUGHIGH

## 2017-05-06 RX ADMIN — DORZOLAMIDE HYDROCHLORIDE 1 DROP: 20 SOLUTION/ DROPS OPHTHALMIC at 07:57

## 2017-05-06 RX ADMIN — METOPROLOL TARTRATE 75 MG: 50 TABLET, FILM COATED ORAL at 08:00

## 2017-05-06 RX ADMIN — IRBESARTAN 150 MG: 150 TABLET ORAL at 08:00

## 2017-05-06 RX ADMIN — ACETAMINOPHEN 500 MG: 500 TABLET, FILM COATED ORAL at 08:00

## 2017-05-06 RX ADMIN — PANTOPRAZOLE SODIUM 40 MG: 40 TABLET, DELAYED RELEASE ORAL at 08:00

## 2017-05-06 RX ADMIN — BRIMONIDINE TARTRATE, TIMOLOL MALEATE 1 DROP: 2; 5 SOLUTION/ DROPS OPHTHALMIC at 08:03

## 2017-05-06 RX ADMIN — AMLODIPINE BESYLATE 10 MG: 10 TABLET ORAL at 08:00

## 2017-05-06 NOTE — PLAN OF CARE
Problem: Goal Outcome Summary  Goal: Goal Outcome Summary  PT: Pt completes supine to sit with SBA, sit<>stand with SBA, gait with FWW 1x170' with close SBA, requires increased verbal cues for pathfinding and CGA with turns. Pt with decreased WBing through L LE 2/2 knee pain, appropriate to continue use of FWW vs 4ww for increased stability d/t increased B UE demonstrated. Pt with fair participation of seated LE ex. PT recommendation: TCU to progress strength, balance and activity tolerance for increased safety and IND with functional mobility.    Physical Therapy Discharge Summary    Reason for therapy discharge:    Discharged to transitional care facility.    Progress towards therapy goal(s). See goals on Care Plan in [facility] electronic health record for goal details.  Goals not met.  Barriers to achieving goals:   discharge from facility.    Therapy recommendation(s):    Continued therapy is recommended.  Rationale/Recommendations:  progress safety and IND with functional mobility.

## 2017-05-06 NOTE — PLAN OF CARE
Problem: Goal Outcome Summary  Goal: Goal Outcome Summary  Outcome: No Change  A&Ox3-4. Forgetful at times. Fall risk-up to the chair twice and bathroom 3 times. Na 129-placed on fluid restriction. Good appetite. Will watch for one more day, if impro\landon will discharge to TCU.

## 2017-05-06 NOTE — PLAN OF CARE
Problem: Goal Outcome Summary  Goal: Goal Outcome Summary  Outcome: Adequate for Discharge Date Met:  05/06/17  Discharge packet ready for TCU. IV removed. Belongings accounted for. Ate lunch. HE to transport.    Problem: PT General Care Plan  Goal: PT Frequency  Outcome: Change based on patient need/priority Date Met:  05/06/17

## 2017-05-06 NOTE — PLAN OF CARE
Problem: Goal Outcome Summary  Goal: Goal Outcome Summary  Outcome: Improving  Pt alert and oriented except to situation. Forgetful ,VSS on RA. On fluid restriction of 1500mL/day, Last checked Na level 129, Up with 1 assist and a walker/gaitbelt. Denies pain, discharging today to [facility] tcu, continue to monitor.

## 2017-05-06 NOTE — DISCHARGE SUMMARY
[facility]    Discharge Summary  Hospitalist    Date of Admission:  5/2/2017  Date of Discharge:  5/6/2017  Discharging Provider: Skinny Espana  Date of Service (when I saw the patient): 05/06/17    Discharge Diagnoses     Hyponatremia  Left leg discomfort following a fall   Deconditioning  Chronic kidney disease stage IV  Hypothyroidism.    History of Present Illness   Milagros Marie is a 89 year old female with a past medical history significant for chronic kidney disease stage IV, hypertension, GERD and dementia who presents to the Emergency Department with hyponatremia, leg discomfort and weakness following a fall.     Hospital Course   Milagros Marie was admitted on 5/2/2017.  The following problems were addressed during her hospitalization:      Hyponatremia  Na 125 on admit. Na on 3/30 was 132. Unsure of the present duration. Suspected initially this was due to hypovolemia on admit, though I do note she has some pain, so could completely rule out SIADH.  Treated with NS 75 mL per hour and Na improved to 131 HOD # 1 but stalled and fell again to 129.  Urine osmolality is low normal at 255 and serum osmol is low at 271 and not consistent with clear SIADH.  Has had Na levels as low as 125 in the past and felt to be related to her drinking too much water per clinic notes.  Placed on a 1500 ml fluid restricted intake on 5/5 and Na on 5/6 was 134. Discharged on a 1500 ml fluid restriction.  To follow Na level in one week.         Left leg discomfort following a fall   Deconditioning.   No evidence of fracture. Pain control as needed with judicious use of narcotics. PT/OT--> TCU.       Chronic kidney disease stage IV.   Creatinine baseline seems to be around 1.5-1.8. Cr is 1.9 on admit. Cr 1.73 on d/c.        Hypothyroidism.   Will continue with Synthroid. TSH 8.58 but FT4 normal. Will need to repeat TFT's in 6-8 weeks with pcp.         Skinny Espana      Significant  Results and Procedures   See below    Pending Results   These results will be followed up by JOSUE FINLEY, PCP  Unresulted Labs Ordered in the Past 30 Days of this Admission     No orders found from 3/3/2017 to 5/3/2017.          Code Status   DNR / DNI       Primary Care Physician   Maria Eugenia Holley    Physical Exam   Temp: 98.3  F (36.8  C) Temp src: Oral BP: 155/72 Pulse: 82 Heart Rate: 75 Resp: 16 SpO2: 95 % O2 Device: None (Room air)    Vitals:    05/05/17 0700 05/06/17 0634   Weight: 61.2 kg (134 lb 14.7 oz) 61.1 kg (134 lb 11.2 oz)     Vital Signs with Ranges  Temp:  [97.3  F (36.3  C)-98.6  F (37  C)] 98.3  F (36.8  C)  Pulse:  [82] 82  Heart Rate:  [67-75] 75  Resp:  [14-16] 16  BP: (138-166)/(66-77) 155/72  SpO2:  [95 %-99 %] 95 %  I/O last 3 completed shifts:  In: 1080 [P.O.:1080]  Out: 1885 [Urine:1885]    Gen: Patient in no acute distress.  Appears comfortable.  Heart:  S1S2+, regular rate and rhythm, No murmurs.  Lungs:  Clear to auscultation, no wheezing, no rales.   Abdomen:  Soft, non tender, non distended, bowel sounds positive.  Extremities:  No edema.    Discharge Disposition   Discharged to nursing home  Condition at discharge: Stable    Consultations This Hospital Stay   PHYSICAL THERAPY ADULT IP CONSULT  OCCUPATIONAL THERAPY ADULT IP CONSULT  PHYSICAL THERAPY ADULT IP CONSULT  OCCUPATIONAL THERAPY ADULT IP CONSULT    Time Spent on this Encounter   I, Skinny Espana, personally saw the patient today and spent greater than 30 minutes discharging this patient.    Discharge Orders     General info for SNF   Length of Stay Estimate: Short Term Care: Estimated # of Days <30  Condition at Discharge: Improving  Level of care:skilled   Rehabilitation Potential: Good  Admission H&P remains valid and up-to-date: Yes  Recent Chemotherapy: N/A  Use Nursing Home Standing Orders: Yes     Mantoux instructions   Give two-step Mantoux (PPD) Per Facility Policy Yes     Intake and output   Every shift     Daily  weights   Call Provider for weight gain of more than 2 pounds per day or 5 pounds per week.     Reason for your hospital stay   You presented after a fall and found to have low sodium levels. This was treated fluid restriction with improvement.     Follow Up and recommended labs and tests   Follow up with alf physician.  The following labs/tests are recommended: Will need to occasionally monitor a BMP to monitor her Na and Cr levels.     Activity - Up with nursing assistance     DNR/DNI     Physical Therapy Adult Consult   Evaluate and treat as clinically indicated.    Reason:  Deconditioning / Falls     Occupational Therapy Adult Consult   Evaluate and treat as clinically indicated.    Reason:  Deconditioning / Falls     Fall precautions     Advance Diet as Tolerated   Follow this diet upon discharge:      Fluid restriction 1500 ML FLUID     Combination Diet Regular Diet Adult       Discharge Medications   Current Discharge Medication List      CONTINUE these medications which have CHANGED    Details   Magnesium 500 MG CAPS Take 1 tablet by mouth every other day Daughter does not know salt form  Qty: 30 capsule, Refills: 3    Associated Diagnoses: Other fatigue         CONTINUE these medications which have NOT CHANGED    Details   dorzolamide (TRUSOPT) 2 % ophthalmic solution Place 1 drop Into the left eye 2 times daily      pantoprazole (PROTONIX) 40 MG EC tablet Take one tab orally twice a day for 8 weeks, then daily indefinitely,  Take on empty stomach about half an hour before eating.  Qty: 60 tablet, Refills: 2    Associated Diagnoses: Upper GI bleed      ferrous gluconate (FERGON) 324 (38 FE) MG tablet Take 1 tablet (324 mg) by mouth 2 times daily  Qty: 100 tablet, Refills: 1    Associated Diagnoses: Upper GI bleed      levothyroxine (SYNTHROID/LEVOTHROID) 50 MCG tablet TAKE 1 TABLET (50 MCG) BY MOUTH DAILY  Qty: 90 tablet, Refills: 2    Associated Diagnoses: Hypothyroidism due to acquired atrophy of  thyroid      AMLODIPINE BESYLATE PO Take 10 mg by mouth daily      metoprolol (LOPRESSOR) 50 MG tablet TAKE 1 AND 1/2 TABLET TWICE DAILY WITH FOOD OR MILK  Qty: 270 tablet, Refills: 3    Associated Diagnoses: Encounter for medication refill      irbesartan (AVAPRO) 300 MG tablet Take 0.5 tablets (150 mg) by mouth 2 times daily  Qty: 30 tablet    Associated Diagnoses: Essential hypertension      Probiotic Product (ADVANCED PROBIOTIC) CAPS Take 1 tablet by mouth daily (with lunch)       acetaminophen (ACETAMINOPHEN EXTRA STRENGTH) 500 MG tablet Take 500 mg by mouth 3 times daily       COMBIGAN 0.2-0.5 % ophthalmic solution Place 1 drop Into the left eye 2 times daily  Refills: 4      Fexofenadine HCl (ALLEGRA PO) Take 180 mg by mouth daily.       travoprost Z, benzalkonium, (TRAVATAN) 0.004 % ophthalmic solution 1 drop. One drop in left eye at bedtime.       Calcium Carbonate-Vitamin D (CALCIUM 600 + D OR) Take 1 tablet by mouth daily       cephALEXin (KEFLEX) 500 MG capsule Take 2,000 mg by mouth as needed Administer one hour prior to dental procedure  Refills: 99         STOP taking these medications       HYDROcodone-acetaminophen (NORCO) 5-325 MG per tablet Comments:   Reason for Stopping:             Allergies   Allergies   Allergen Reactions     Sulfa Drugs      Data   Most Recent 3 CBC's:  Recent Labs   Lab Test  05/03/17   0943  05/02/17 1820 04/04/17 03/30/17   WBC  6.9  7.9   --    --   6.1   HGB  8.9*  9.8*  7.2*   < >  6.7*   MCV  93  91   --    --   101*   PLT  396  405   --    --   333    < > = values in this interval not displayed.      Most Recent 3 BMP's:  Recent Labs   Lab Test  05/06/17   0715  05/05/17   0849  05/04/17   0630   NA  134  129*  130*   POTASSIUM  4.5  4.9  4.7   CHLORIDE  102  99  100   CO2  21  21  22   BUN  35*  33*  35*   CR  1.73*  1.69*  1.58*   ANIONGAP  11  9  8   SAMSON  8.4*  8.9  8.4*   GLC  91  122*  95     Most Recent 2 LFT's:  Recent Labs   Lab Test  05/02/17   1820   03/21/17   0819   AST  19  12   ALT  23  11   ALKPHOS  147  58   BILITOTAL  0.4  0.6     Most Recent INR's and Anticoagulation Dosing History:  Anticoagulation Dose History     Recent Dosing and Labs Latest Ref Rng & Units 10/28/2002 3/21/2017    INR 0.86 - 1.14 1.00 1.05        Most Recent 3 Troponin's:  Recent Labs   Lab Test  03/28/17   1131  03/21/17   0819   TROPI  <0.015  The 99th percentile for upper reference range is 0.045 ug/L.  Troponin values in   the range of 0.045 - 0.120 ug/L may be associated with risks of adverse   clinical events.    <0.015  The 99th percentile for upper reference range is 0.045 ug/L.  Troponin values in   the range of 0.045 - 0.120 ug/L may be associated with risks of adverse   clinical events.       Most Recent Cholesterol Panel:  Recent Labs   Lab Test  11/22/11   1007   CHOL  228*   LDL  146*   HDL  61*   TRIG  107     Most Recent 6 Bacteria Isolates From Any Culture (See EPIC Reports for Culture Details):  Recent Labs   Lab Test  06/12/09   0840   CULT  No growth     Most Recent TSH, T4 and A1c Labs:  Recent Labs   Lab Test  05/04/17   0630   TSH  8.58*   T4  1.01       Results for orders placed or performed during the hospital encounter of 03/21/17   POC US ABDOMEN LIMITED    [facility] Procedure Note      Limited Bedside ED Aorta Ultrasound:    PROCEDURE: PERFORMED BY: Dr. Nickolas Looney  INDICATIONS:  Abdominal Pain    PROBE:  Low frequency convex probe  BODY LOCATION: Abdomen  FINDINGS:  The ultrasound was performed using transverse views.   Normal: Abdominal aorta < 4 cm.  MEASUREMENT:  2.6 cm   No evidence of free fluid in hepatorenal (Morison s pouch), perisplenic, or and pelvic areas. No evidence of pericardial effusion.  INTERPRETATION:  The evaluation of the aorta was of normal caliber (ie < 4cm in the transverse/longitudinal views) without evidence of aneurysm or dilation.  Aorta visualized in entirety from insertion into the intra-abdominal  cavity to bifurcation into iliac vessels. There was no abdominal free fluid.  IMAGE DOCUMENTATION: Images were archived to PACs system.     Chest  XR, 1 view PORTABLE    Narrative    XR CHEST PORT 1 VW 3/21/2017 9:11 AM    HISTORY: syncope      Impression    IMPRESSION: Moderate sized esophageal hiatal hernia. Minimal  interstitial infiltrate left lung base which may be acute or chronic.  No other findings.    KRISTIN HARRIS MD   Head CT w/o contrast    Narrative    CT SCAN OF THE HEAD WITHOUT CONTRAST March 21, 2017 9:35 AM     HISTORY: Altered mentation, syncope.    TECHNIQUE:  Axial images of the head and coronal reformations without  IV contrast material. Radiation dose for this scan was reduced using  automated exposure control, adjustment of the mA and/or kV according  to patient size, or iterative reconstruction technique.    COMPARISON: MR dated 10/9/2015.    FINDINGS: Mild to moderate cerebral atrophy is present. There are some  minimal patchy white matter changes in both hemispheres without mass  effect. There is no evidence for intracranial hemorrhage, mass effect,  acute infarct, or skull fracture.      Impression    IMPRESSION: Chronic changes. No evidence for intracranial hemorrhage  or any acute process.    JA SERRA MD   Abd/pelvis CT no contrast - Stone Protocol    Narrative    CT ABDOMEN AND PELVIS WITHOUT CONTRAST  3/21/2017 9:35 AM     HISTORY: Abdomen pain and melena.  Chronic kidney disease.    TECHNIQUE: Noncontrast CT abdomen and pelvis was performed. Radiation  dose for this scan was reduced using automated exposure control,  adjustment of the mA and/or kV according to patient size, or iterative  reconstruction technique.    COMPARISON: CT abdomen and pelvis 6/12/2009.    FINDINGS: There is a moderate hiatal hernia that is more distended  than the prior exam. Left renal atrophy. A hyperdense lesion at the  anterior medial right mid kidney is 0.6 cm and appears new on image  24. There is a  cyst posteriorly at this same image, and likely other  very ill-defined right renal cysts. Cholelithiasis. There is no acute  abnormality involving the liver, spleen, adrenals, or pancreas  identified at unenhanced scanning. There is a hepatic cyst again  identified, image 25, at the anterior lower liver. No hydronephrosis.  No evidence for bowel obstruction. Stool distends the rectosigmoid  colon. Distal colonic diverticulosis without diverticulitis. There is  also moderate stool distending the more proximal colon. Vascular  calcifications. There is no abscess or free air identified. There is a  degree of subcutaneous edema.      Impression    IMPRESSION:  1. No convincing acute abnormality is seen.  2. Stool distends the colon.  3. Cholelithiasis.  4. Moderate hiatal hernia is more distended than in 2009.  5. An indeterminate new hyperdense medial right renal lesion is  subcentimeter. This may just be a small hemorrhagic cyst. A solid  lesion is not completely excluded.  6. Nonspecific mild subcutaneous edema diffusely.  7. Diffuse vascular calcifications.    MANPREET WATKINS MD

## 2017-05-15 ENCOUNTER — TRANSFERRED RECORDS (OUTPATIENT)
Dept: HEALTH INFORMATION MANAGEMENT | Facility: CLINIC | Age: 82
End: 2017-05-15

## 2017-05-15 LAB
ANION GAP SERPL CALCULATED.3IONS-SCNC: 9 MMOL/L (ref 5–18)
BUN SERPL-MCNC: 39 MG/DL (ref 8–28)
CALCIUM SERPL-MCNC: 8.7 MG/DL (ref 8.5–10.5)
CHLORIDE SERPLBLD-SCNC: 103 MMOL/L (ref 98–107)
CO2 SERPL-SCNC: 21 MMOL/L (ref 22–31)
CREAT SERPL-MCNC: 1.86 MG/DL (ref 0.6–1.1)
ERYTHROCYTE [DISTWIDTH] IN BLOOD BY AUTOMATED COUNT: 13.8 % (ref 11–14.5)
GFR SERPL CREATININE-BSD FRML MDRD: 25 ML/MIN/1.73M2
GLUCOSE SERPL-MCNC: 98 MG/DL (ref 70–125)
HCT VFR BLD AUTO: 25.8 % (ref 35–47)
HEMOGLOBIN: 8.4 G/DL (ref 12–16)
MCH RBC QN AUTO: 31.2 PG (ref 27–34)
MCHC RBC AUTO-ENTMCNC: 32.6 G/DL (ref 32–36)
MCV RBC AUTO: 96 FL (ref 80–100)
PLATELET # BLD AUTO: 344 THOU/UL (ref 140–440)
POTASSIUM SERPL-SCNC: 4.8 MMOL/L (ref 3.5–5)
RBC # BLD AUTO: 2.69 MILL/UL (ref 3.8–5.4)
SODIUM SERPL-SCNC: 133 MMOL/L (ref 136–145)
WBC # BLD AUTO: 7.8 THOU/UL (ref 4–11)

## 2017-06-13 ENCOUNTER — NURSING HOME VISIT (OUTPATIENT)
Dept: GERIATRICS | Facility: CLINIC | Age: 82
End: 2017-06-13
Payer: MEDICARE

## 2017-06-13 VITALS
HEART RATE: 60 BPM | DIASTOLIC BLOOD PRESSURE: 78 MMHG | WEIGHT: 127 LBS | BODY MASS INDEX: 22.5 KG/M2 | RESPIRATION RATE: 18 BRPM | OXYGEN SATURATION: 98 % | SYSTOLIC BLOOD PRESSURE: 155 MMHG | TEMPERATURE: 98.6 F

## 2017-06-13 DIAGNOSIS — G30.1 LATE ONSET ALZHEIMER'S DISEASE WITHOUT BEHAVIORAL DISTURBANCE (H): ICD-10-CM

## 2017-06-13 DIAGNOSIS — K92.2 UPPER GI BLEED: ICD-10-CM

## 2017-06-13 DIAGNOSIS — N18.4 CKD (CHRONIC KIDNEY DISEASE) STAGE 4, GFR 15-29 ML/MIN (H): ICD-10-CM

## 2017-06-13 DIAGNOSIS — M15.0 PRIMARY OSTEOARTHRITIS INVOLVING MULTIPLE JOINTS: ICD-10-CM

## 2017-06-13 DIAGNOSIS — E03.4 HYPOTHYROIDISM DUE TO ACQUIRED ATROPHY OF THYROID: ICD-10-CM

## 2017-06-13 DIAGNOSIS — I10 BENIGN ESSENTIAL HYPERTENSION: ICD-10-CM

## 2017-06-13 DIAGNOSIS — E87.1 HYPONATREMIA: Primary | ICD-10-CM

## 2017-06-13 DIAGNOSIS — F02.80 LATE ONSET ALZHEIMER'S DISEASE WITHOUT BEHAVIORAL DISTURBANCE (H): ICD-10-CM

## 2017-06-13 PROCEDURE — 99207 ZZC CDG-CORRECTLY CODED, REVIEWED AND AGREE: CPT | Performed by: INTERNAL MEDICINE

## 2017-06-13 PROCEDURE — 99306 1ST NF CARE HIGH MDM 50: CPT | Performed by: INTERNAL MEDICINE

## 2017-06-13 RX ORDER — FOLIC ACID 0.8 MG
1 TABLET ORAL DAILY
COMMUNITY
End: 2017-08-08 | Stop reason: ALTCHOICE

## 2017-06-19 PROBLEM — F02.80 LATE ONSET ALZHEIMER'S DISEASE WITHOUT BEHAVIORAL DISTURBANCE (H): Status: ACTIVE | Noted: 2017-06-19

## 2017-06-19 PROBLEM — G30.1 LATE ONSET ALZHEIMER'S DISEASE WITHOUT BEHAVIORAL DISTURBANCE (H): Status: ACTIVE | Noted: 2017-06-19

## 2017-06-20 NOTE — PROGRESS NOTES
Milagros Marie is a 89 year old female seen June 19, 2017 at [facility] where she has resided for 2 months, readmitted from [facility] hospital in May 2017, seen for initial visit.   Patient is seen in her room, pleasant and conversational.   Reports she is feeling well, has knee pain, OA.   Ambulates with her walker.    She was living at home with her daughter until March 2017 when she was admitted with GI bleed, treated conservatively with PPI, sucralfate and Fe.   hgb has been stable and no further bleed reported.     She was discharged to TCU, then transferred to [facility] as patient's daughter no longer able to manage patient safely at home.     Readmitted after a fall with LLE pain, and found to be hyponatremic.   Tx'd in hospital with NS and fluid restriction.   Now back to [facility] and feels she is settling in well.   Eating okay, weight stable.   Reports fatigue.      Past Medical History:   Diagnosis Date     ACP (advance care planning)     in media section     Dementia      DJD (degenerative joint disease)      Essential hypertension, benign      Glaucoma      Homocysteinemia (H)      HTN (hypertension)      Osteopenia      Reflux      Renal failure 2008    stage 3-4     Stenosis      Stress incontinence, female      Unspecified hypothyroidism      Past Surgical History:   Procedure Laterality Date     BLADDER SURGERY       BLEPHAROPLASTY       CARPAL TUNNEL RELEASE RT/LT       CATARACT IOL, RT/LT      Cataract IOL RT/LT     EYE SURGERY       HYSTERECTOMY      tabso     JOINT REPLACEMTN, KNEE RT/LT      Joint Replacement knee RT/LT     Family History   Problem Relation Age of Onset     HEART DISEASE Mother      HEART DISEASE Father      Cancer - colorectal Sister      CANCER Sister      Social History   Substance Use Topics     Smoking status: Never Smoker     Smokeless tobacco: Never Used     Alcohol use Yes      Comment: occ.      SH:  Previously lived with her daughterxiomara in  [place].    She attended [facility] program.       Review Of Systems  Skin: negative   Eyes: impaired vision, glasses  Ears/Nose/Throat: hearing loss  Respiratory: No shortness of breath, dyspnea on exertion, cough, or hemoptysis  Cardiovascular: lower extremity edema and exercise intolerance  Gastrointestinal: as above  Genitourinary: negative  Musculoskeletal: falls, LLE pain.  Ambulates with walker.     Neurologic: STML  Psychiatric: negative  Hematologic/Lymphatic/Immunologic: anemia  Endocrine: thyroid disorder      GENERAL APPEARANCE: alert and no distress  /78  Pulse 60  Temp 98.6  F (37  C)  Resp 18  Wt 127 lb (57.6 kg)  SpO2 98%  BMI 22.5 kg/m2   HEENT: normocephalic, no lesion or abnormalities  +kyphosis  NECK: no adenopathy, no asymmetry, masses, or scars and thyroid normal to palpation  RESP: lungs clear to auscultation - no rales, rhonchi or wheezes  CV: regular rate and rhythm, normal S1 S2, occ ectopy, 2/6 UNA  ABDOMEN:  soft, nontender, no HSM or masses and bowel sounds normal  MS: deformities of OA in LE joints and hands, 1+ LE edema  SKIN: no suspicious lesions or rashes  NEURO: Normal strength and tone, sensory exam grossly normal, and speech normal  PSYCH: affect okay  LYMPHATICS: No cervical,  or supraclavicular nodes     Lab Results   Component Value Date    WBC 7.8 05/15/2017     Lab Results   Component Value Date    HGB 8.4 05/15/2017     Lab Results   Component Value Date    MCV 96 05/15/2017     Lab Results   Component Value Date     05/15/2017      Last Basic Metabolic Panel:  Lab Results   Component Value Date     05/15/2017      Lab Results   Component Value Date    POTASSIUM 4.8 05/15/2017     Lab Results   Component Value Date    CHLORIDE 103 05/15/2017     Lab Results   Component Value Date    SAMSON 8.7 05/15/2017     Lab Results   Component Value Date    CO2 21 05/15/2017     Lab Results   Component Value Date    BUN 39 05/15/2017     Lab Results    Component Value Date    CR 1.86 05/15/2017   GFR 25  Lab Results   Component Value Date    GLC 98 05/15/2017     TSH   Date Value Ref Range Status   05/04/2017 8.58 (H) 0.40 - 4.00 mU/L Final       IMP/PLAN:  (E87.1) Hyponatremia  (primary encounter diagnosis)  Comment: improved.     Plan: no offending meds on her list  Would avoid fluid restriction  Check Na if any recurrent symptoms       (M15.0) Primary osteoarthritis involving multiple joints  Comment: knee pain  Plan: scheduled acetaminophen, local measures    Continue calcium and vit D.       (E03.4) Hypothyroidism due to acquired atrophy of thyroid  Comment: on replacement, goal 4-6     Plan: TSH high during time of hospitalizations and recurrent illness.   Would recheck before dose adjustment.       (I10) Benign essential hypertension  Comment: good control  Plan: continue amlodipine, irbesartan and metoprolol    (K92.2) Upper GI bleed  Comment: resolved  Plan: patient is on high dose Protonix, but can decrease to once daily now.    Continue Fe.         (N18.4) CKD (chronic kidney disease) stage 4, GFR 15-29 ml/min (H)  Comment: with secondary anemia  Plan: maintain hydration, minimize meds, follow BMP.     (G30.1,  F02.80) Late onset Alzheimer's disease without behavioral disturbance  Comment: low cognitive scores and functional decline  Plan: Board and Care support for assist with meds, meals, transfers, safety.   Patient is on daily Allegra, presumably for allergies.  Would change to prn to decrease anticholinergic burden.           Ekaterina Mckeon MD

## 2017-07-06 ENCOUNTER — NURSING HOME VISIT (OUTPATIENT)
Dept: GERIATRICS | Facility: CLINIC | Age: 82
End: 2017-07-06
Payer: MEDICARE

## 2017-07-06 ENCOUNTER — TRANSFERRED RECORDS (OUTPATIENT)
Dept: HEALTH INFORMATION MANAGEMENT | Facility: CLINIC | Age: 82
End: 2017-07-06

## 2017-07-06 VITALS
TEMPERATURE: 98.6 F | BODY MASS INDEX: 23.4 KG/M2 | RESPIRATION RATE: 20 BRPM | HEART RATE: 63 BPM | WEIGHT: 132.1 LBS | OXYGEN SATURATION: 98 % | SYSTOLIC BLOOD PRESSURE: 142 MMHG | DIASTOLIC BLOOD PRESSURE: 64 MMHG

## 2017-07-06 DIAGNOSIS — K21.9 GASTROESOPHAGEAL REFLUX DISEASE, ESOPHAGITIS PRESENCE NOT SPECIFIED: ICD-10-CM

## 2017-07-06 DIAGNOSIS — F02.80 LATE ONSET ALZHEIMER'S DISEASE WITHOUT BEHAVIORAL DISTURBANCE (H): ICD-10-CM

## 2017-07-06 DIAGNOSIS — I10 BENIGN ESSENTIAL HYPERTENSION: Primary | ICD-10-CM

## 2017-07-06 DIAGNOSIS — M15.0 PRIMARY OSTEOARTHRITIS INVOLVING MULTIPLE JOINTS: ICD-10-CM

## 2017-07-06 DIAGNOSIS — E03.4 HYPOTHYROIDISM DUE TO ACQUIRED ATROPHY OF THYROID: ICD-10-CM

## 2017-07-06 DIAGNOSIS — K92.2 UPPER GI BLEED: ICD-10-CM

## 2017-07-06 DIAGNOSIS — G30.1 LATE ONSET ALZHEIMER'S DISEASE WITHOUT BEHAVIORAL DISTURBANCE (H): ICD-10-CM

## 2017-07-06 LAB — TSH SERPL-ACNC: 2.8 UIU/ML (ref 0.3–5)

## 2017-07-06 PROCEDURE — 99207 ZZC CDG-UP CODE -MED NECESSITY: CPT | Performed by: NURSE PRACTITIONER

## 2017-07-06 PROCEDURE — 99310 SBSQ NF CARE HIGH MDM 45: CPT | Performed by: NURSE PRACTITIONER

## 2017-07-06 NOTE — PROGRESS NOTES
[facility]    Chief Complaint   Patient presents with     snf Regulatory       HPI:    Milagros Marie is a 89 year old  (9/17/1923), who is being seen today for a federally mandated E/M visit at [facility].  HPI information obtained from: facility chart records and facility staff.       Today's concerns are:  Benign essential hypertension  BP readings range:  142/64  144/68  142/70  152/60  148/69  154/76  162/75  142/70  -Denies CP, SOB, and lightheadedness.   -Currently taking Metoprolol 50 mg/day    Late onset Alzheimer's disease without behavioral disturbance  -No behaviors noted by staff.     Primary osteoarthritis involving multiple joints  -Denies pain during today's visit.   -Tylenol scheduled 500 mg/TID    Upper GI bleed  Hemoglobin   Date Value Ref Range Status   05/15/2017 8.4 (A) 12.0 - 16.0 g/dL Final   05/03/2017 8.9 (L) 11.7 - 15.7 g/dL Final       Hypothyroidism due to acquired atrophy of thyroid  TSH   Date Value Ref Range Status   05/04/2017 8.58 (H) 0.40 - 4.00 mU/L Final   Current regimen Levothyroxine 50mcg po qday.   -Denies fatigue or constipation       Gastroesophageal reflux disease, esophagitis presence not specified  Pantoprazole Sodium tab 40mg po qday started 5/25.  -Denies dyspepsia     ALLERGIES: Sulfa drugs  PAST MEDICAL HISTORY:  has a past medical history of ACP (advance care planning); Dementia; DJD (degenerative joint disease); Essential hypertension, benign; Glaucoma; Homocysteinemia (H); HTN (hypertension); Osteopenia; Reflux; Renal failure (2008); Stenosis; Stress incontinence, female; and Unspecified hypothyroidism.  PAST SURGICAL HISTORY:  has a past surgical history that includes joint replacemtn, knee rt/lt; Hysterectomy; carpal tunnel release rt/lt; cataract iol, rt/lt; Blepharoplasty; Eye surgery; and Bladder surgery.  FAMILY HISTORY: family history includes CANCER in her sister; Cancer - colorectal in her sister; HEART  DISEASE in her father and mother.  SOCIAL HISTORY:  reports that she has never smoked. She has never used smokeless tobacco. She reports that she drinks alcohol. She reports that she does not use illicit drugs.    MEDICATIONS:  Current Outpatient Prescriptions   Medication Sig Dispense Refill     Magnesium 500 MG CAPS Take 1 capsule by mouth daily       dorzolamide (TRUSOPT) 2 % ophthalmic solution Place 1 drop Into the left eye 2 times daily       pantoprazole (PROTONIX) 40 MG EC tablet Take one tab orally twice a day for 8 weeks, then daily indefinitely,  Take on empty stomach about half an hour before eating. 60 tablet 2     ferrous gluconate (FERGON) 324 (38 FE) MG tablet Take 1 tablet (324 mg) by mouth 2 times daily 100 tablet 1     levothyroxine (SYNTHROID/LEVOTHROID) 50 MCG tablet TAKE 1 TABLET (50 MCG) BY MOUTH DAILY 90 tablet 2     AMLODIPINE BESYLATE PO Take 10 mg by mouth daily       metoprolol (LOPRESSOR) 50 MG tablet TAKE 1 AND 1/2 TABLET TWICE DAILY WITH FOOD OR MILK 270 tablet 3     cephALEXin (KEFLEX) 500 MG capsule Take 2,000 mg by mouth as needed Administer one hour prior to dental procedure  99     irbesartan (AVAPRO) 300 MG tablet Take 0.5 tablets (150 mg) by mouth 2 times daily 30 tablet      Probiotic Product (ADVANCED PROBIOTIC) CAPS Take 1 tablet by mouth daily (with lunch)        acetaminophen (ACETAMINOPHEN EXTRA STRENGTH) 500 MG tablet Take 500 mg by mouth 3 times daily        COMBIGAN 0.2-0.5 % ophthalmic solution Place 1 drop Into the left eye 2 times daily  4     Fexofenadine HCl (ALLEGRA PO) Take 180 mg by mouth daily.        travoprost Z, benzalkonium, (TRAVATAN) 0.004 % ophthalmic solution 1 drop. One drop in left eye at bedtime.        Calcium Carbonate-Vitamin D (CALCIUM 600 + D OR) Take 1 tablet by mouth daily            Case Management:  I have reviewed the care plan and MDS and do agree with the plan. Patient's desire to return to the community is not present.  Information  reviewed:  Medications, vital signs, orders, and nursing notes.    ROS:  4 point ROS including Respiratory, CV, GI and , other than that noted in the HPI,  is negative    Exam:  Vitals: /64  Pulse 63  Temp 98.6  F (37  C)  Resp 20  Wt 132 lb 1.6 oz (59.9 kg)  SpO2 98%  BMI 23.4 kg/m2  BMI= Body mass index is 23.4 kg/(m^2).  GENERAL APPEARANCE:  Alert, in no distress  ENT:  Mouth and posterior oropharynx normal, moist mucous membranes  RESP:  respiratory effort and palpation of chest normal, lungs clear to auscultation   CV:  Palpation and auscultation of heart done , regular rate and rhythm, no murmur, rub, or gallop  M/S:   Gait and station normal  Digits and nails normal  SKIN:  Inspection of skin and subcutaneous tissue baseline, Palpation of skin and subcutaneous tissue baseline  NEURO:   Cranial nerves 2-12 are normal tested and grossly at patient's baseline    Lab/Diagnostic data:      CBC RESULTS:   Recent Labs   Lab Test 05/15/17  05/03/17   0943   WBC  7.8  6.9   RBC  2.69*  2.85*   HGB  8.4*  8.9*   HCT  25.8*  26.5*   MCV  96  93   MCH  31.2  31.2   MCHC  32.6  33.6   RDW  13.8  13.7   PLT  344  396       Last Basic Metabolic Panel:  Recent Labs   Lab Test 05/15/17  05/06/17   0715   NA  133*  134   POTASSIUM  4.8  4.5   CHLORIDE  103  102   SAMSON  8.7  8.4*   CO2  21*  21   BUN  39*  35*   CR  1.86*  1.73*   GLC  98  91       Liver Function Studies -   Recent Labs   Lab Test  05/02/17   1820  03/21/17   0819   PROTTOTAL  7.5  5.3*   ALBUMIN  3.6  2.6*   BILITOTAL  0.4  0.6   ALKPHOS  147  58   AST  19  12   ALT  23  11       TSH   Date Value Ref Range Status   05/04/2017 8.58 (H) 0.40 - 4.00 mU/L Final       Lab Results   Component Value Date    INR 1.05 03/21/2017       ASSESSMENT/PLAN  (I10) Benign essential hypertension  (primary encounter diagnosis)  -Chronic, controlled on medication.   -Keep SBP> 130 mmHg and DBP > 65 mmHg (levels below these increase mortality as shown by standard  studies and observations).     (G30.1,  F02.80) Late onset Alzheimer's disease without behavioral disturbance  Chronic, progressive. HPI/ROS difficult to obtain with cognitive impairment. Expect further functional and cognitive decline. Expect weight loss. Monitor weight. Monitor functional status. Monitor for behavioral disturbances.    (M15.0) Primary osteoarthritis involving multiple joints  -Continue tylenol as ordered.   -Continue to monitor and notify NP with changes     (K92.2) Upper GI bleed  -Continue Protonix as ordered.   -Continue to monitor and notify NP with changes.     (E03.4) Hypothyroidism due to acquired atrophy of thyroid  -Continue replacement.   -Check TSH next lab day, and keep level b/w 4-5 in elderly.      (K21.9) Gastroesophageal reflux disease, esophagitis presence not specified  -Continue Protonix as ordered.   -Continue to monitor and notify NP with changes.     Orders:  The current medical regimen is effective; continue present plan and medications.    Total time spent with patient visit at the skilled nursing facility was 35 min including patient visit and review of past records. Greater than 50% of total time spent with counseling and coordinating care due to complexity of multple comorbidities     Electronically signed by:  BERNARD Peng CNP  [facility]

## 2017-07-13 ENCOUNTER — TRANSFERRED RECORDS (OUTPATIENT)
Dept: HEALTH INFORMATION MANAGEMENT | Facility: CLINIC | Age: 82
End: 2017-07-13

## 2017-07-14 ENCOUNTER — TRANSFERRED RECORDS (OUTPATIENT)
Dept: HEALTH INFORMATION MANAGEMENT | Facility: CLINIC | Age: 82
End: 2017-07-14

## 2017-07-14 LAB
ANION GAP SERPL CALCULATED.3IONS-SCNC: 10 MMOL/L (ref 5–18)
BUN SERPL-MCNC: 40 MG/DL (ref 8–28)
CALCIUM SERPL-MCNC: 9.3 MG/DL (ref 8.5–10.5)
CHLORIDE SERPLBLD-SCNC: 96 MMOL/L (ref 98–107)
CO2 SERPL-SCNC: 20 MMOL/L (ref 22–31)
CREAT SERPL-MCNC: 1.64 MG/DL (ref 0.6–1.1)
ERYTHROCYTE [DISTWIDTH] IN BLOOD BY AUTOMATED COUNT: 14 % (ref 11–14.5)
GFR SERPL CREATININE-BSD FRML MDRD: 29 ML/MIN/1.73M2
GLUCOSE SERPL-MCNC: 94 MG/DL (ref 70–125)
HCT VFR BLD AUTO: 33.5 % (ref 35–47)
HEMOGLOBIN: 11.6 G/DL (ref 12–16)
MCH RBC QN AUTO: 30.5 PG (ref 27–34)
MCHC RBC AUTO-ENTMCNC: 34.6 G/DL (ref 32–36)
MCV RBC AUTO: 88 FL (ref 80–100)
PLATELET # BLD AUTO: 340 THOU/UL (ref 140–440)
POTASSIUM SERPL-SCNC: 4.8 MMOL/L (ref 3.5–5)
RBC # BLD AUTO: 3.8 MILL/UL (ref 3.8–5.4)
SODIUM SERPL-SCNC: 125 MMOL/L (ref 136–145)
WBC # BLD AUTO: 7.3 THOU/UL (ref 4–11)

## 2017-07-18 ENCOUNTER — TRANSFERRED RECORDS (OUTPATIENT)
Dept: HEALTH INFORMATION MANAGEMENT | Facility: CLINIC | Age: 82
End: 2017-07-18

## 2017-07-18 ENCOUNTER — TELEPHONE (OUTPATIENT)
Dept: GERIATRICS | Facility: CLINIC | Age: 82
End: 2017-07-18

## 2017-07-18 LAB
ANION GAP SERPL CALCULATED.3IONS-SCNC: 11 MMOL/L (ref 5–18)
BUN SERPL-MCNC: 43 MG/DL (ref 8–28)
CALCIUM SERPL-MCNC: 9.1 MG/DL (ref 8.5–10.5)
CHLORIDE SERPLBLD-SCNC: 100 MMOL/L (ref 98–107)
CO2 SERPL-SCNC: 19 MMOL/L (ref 22–31)
CREAT SERPL-MCNC: 1.76 MG/DL (ref 0.6–1.1)
ERYTHROCYTE [DISTWIDTH] IN BLOOD BY AUTOMATED COUNT: 14.4 % (ref 11–14.5)
GFR SERPL CREATININE-BSD FRML MDRD: 27 ML/MIN/1.73M2
GLUCOSE SERPL-MCNC: 83 MG/DL (ref 70–125)
HCT VFR BLD AUTO: 27.6 % (ref 35–47)
HEMOGLOBIN: 9.5 G/DL (ref 12–16)
MCH RBC QN AUTO: 31 PG (ref 27–34)
MCHC RBC AUTO-ENTMCNC: 34.4 G/DL (ref 32–36)
MCV RBC AUTO: 90 FL (ref 80–100)
PLATELET # BLD AUTO: 325 THOU/UL (ref 140–440)
POTASSIUM SERPL-SCNC: 4.6 MMOL/L (ref 3.5–5)
RBC # BLD AUTO: 3.06 MILL/UL (ref 3.8–5.4)
SODIUM SERPL-SCNC: 130 MMOL/L (ref 136–145)
WBC # BLD AUTO: 9.7 THOU/UL (ref 4–11)

## 2017-07-18 NOTE — TELEPHONE ENCOUNTER
Patient with hyponatremia of 125 last week with labs today showing na 130.  Patient up and about per usual with no c/o.  Good po intake.  hgb down from 11.2 to 9.5 with no s/s bleeding.   Also elevated bun at 42 and cr 1.72.    Orders  F/u labs Thursday (next lab day).  Monitor  Update PCP

## 2017-07-20 ENCOUNTER — TRANSFERRED RECORDS (OUTPATIENT)
Dept: HEALTH INFORMATION MANAGEMENT | Facility: CLINIC | Age: 82
End: 2017-07-20

## 2017-07-20 ENCOUNTER — TELEPHONE (OUTPATIENT)
Dept: GERIATRICS | Facility: CLINIC | Age: 82
End: 2017-07-20

## 2017-07-20 LAB
ANION GAP SERPL CALCULATED.3IONS-SCNC: 11 MMOL/L (ref 5–18)
BUN SERPL-MCNC: 36 MG/DL (ref 8–28)
CALCIUM SERPL-MCNC: 9.4 MG/DL (ref 8.5–10.5)
CHLORIDE SERPLBLD-SCNC: 97 MMOL/L (ref 98–107)
CO2 SERPL-SCNC: 21 MMOL/L (ref 22–31)
CREAT SERPL-MCNC: 1.81 MG/DL (ref 0.6–1.1)
GFR SERPL CREATININE-BSD FRML MDRD: 26 ML/MIN/1.73M2
GLUCOSE SERPL-MCNC: 77 MG/DL (ref 70–125)
HEMOGLOBIN: 10.3 G/DL (ref 12–16)
POTASSIUM SERPL-SCNC: 5 MMOL/L (ref 3.5–5)
SODIUM SERPL-SCNC: 129 MMOL/L (ref 136–145)

## 2017-07-20 NOTE — TELEPHONE ENCOUNTER
RN called to notify NP of abnormal sodium of 129 on 7/20/17. Has had Na levels as low as 125 in the past and felt to be related to her drinking too much water per clinic notes.     PLAN:  1.) Currently taking Sodium 1 gram/day  2.) Sodium restriction 1500 ml/day  3.) Redraw sodium level on 07/25/17  4.) Continue to monitor and notify NP with changes.       BERNARD Peng [facility]

## 2017-07-20 NOTE — PROGRESS NOTES
[facility]    Chief Complaint   Patient presents with     RECHECK       HPI:    Milagros Marie is a 89 year old  (9/17/1923), who is being seen today for an episodic care visit at [facility].  HPI information obtained from: facility chart records, facility staff and patient report.      Today's concern is:  Hyponatremia  Sodium Chloride 1g po bid started 7/15.   -Sodium level on 7/10/17 was 129.   -No changes in neuros/cognition     Benign essential hypertension  BP readings range:  160/83  154/78  153/77  138/76  144/67  146/72  124/58  131/56    Late onset Alzheimer's disease without behavioral disturbance        ALLERGIES: Sulfa drugs  Past Medical, Surgical, Family and Social History reviewed and updated in [facility].    Current Outpatient Prescriptions   Medication Sig Dispense Refill     PANTOPRAZOLE SODIUM PO Take 40 mg by mouth daily       SODIUM CHLORIDE PO Take 1 g by mouth 2 times daily       Magnesium 500 MG CAPS Take 1 capsule by mouth daily       dorzolamide (TRUSOPT) 2 % ophthalmic solution Place 1 drop Into the left eye 2 times daily       ferrous gluconate (FERGON) 324 (38 FE) MG tablet Take 1 tablet (324 mg) by mouth 2 times daily 100 tablet 1     levothyroxine (SYNTHROID/LEVOTHROID) 50 MCG tablet TAKE 1 TABLET (50 MCG) BY MOUTH DAILY 90 tablet 2     AMLODIPINE BESYLATE PO Take 10 mg by mouth daily       metoprolol (LOPRESSOR) 50 MG tablet TAKE 1 AND 1/2 TABLET TWICE DAILY WITH FOOD OR MILK 270 tablet 3     cephALEXin (KEFLEX) 500 MG capsule Take 2,000 mg by mouth as needed Administer one hour prior to dental procedure  99     irbesartan (AVAPRO) 300 MG tablet Take 0.5 tablets (150 mg) by mouth 2 times daily 30 tablet      acetaminophen (ACETAMINOPHEN EXTRA STRENGTH) 500 MG tablet Take 500 mg by mouth 3 times daily        COMBIGAN 0.2-0.5 % ophthalmic solution Place 1 drop Into the left eye 2 times daily  4     Fexofenadine HCl (ALLEGRA PO) Take 180 mg by mouth  daily.        travoprost Z, benzalkonium, (TRAVATAN) 0.004 % ophthalmic solution 1 drop. One drop in left eye at bedtime.        Calcium Carbonate-Vitamin D (CALCIUM 600 + D OR) Take 1 tablet by mouth daily        Medications reconciled to facility chart and changes were made to reflect current medications as identified as above med list. Below are the changes that were made:   Medications stopped since last EPIC medication reconciliation:   Medications Discontinued During This Encounter   Medication Reason     pantoprazole (PROTONIX) 40 MG EC tablet Dose adjustment     Probiotic Product (ADVANCED PROBIOTIC) CAPS Medication Reconciliation Clean Up       Medications started since last [facility] medication reconciliation:  Orders Placed This Encounter   Medications     PANTOPRAZOLE SODIUM PO     Sig: Take 40 mg by mouth daily     SODIUM CHLORIDE PO     Sig: Take 1 g by mouth 2 times daily           REVIEW OF SYSTEMS:  4 point ROS including Respiratory, CV, GI and , other than that noted in the HPI,  is negative    Physical Exam:  /83  Pulse 70  Temp 98.2  F (36.8  C)  Resp 18  Wt 132 lb 1.6 oz (59.9 kg)  SpO2 98%  BMI 23.4 kg/m2  GENERAL APPEARANCE:  Alert, in no distress  ENT:  Mouth and posterior oropharynx normal, moist mucous membranes  RESP:  respiratory effort and palpation of chest normal, lungs clear to auscultation   CV:  Palpation and auscultation of heart done , regular rate and rhythm, no murmur, rub, or gallop  ABDOMEN:  normal bowel sounds, soft, nontender, no hepatosplenomegaly or other masses  M/S:   Gait and station normal  Digits and nails normal  SKIN:  Inspection of skin and subcutaneous tissue baseline  NEURO:   Cranial nerves 2-12 are normal tested and grossly at patient's baseline  PSYCH:  memory impaired , affect and mood normal    Recent Labs:      CBC RESULTS:   Recent Labs   Lab Test 05/15/17  05/03/17   0943   WBC  7.8  6.9   RBC  2.69*  2.85*   HGB  8.4*  8.9*   HCT  25.8*  26.5*    MCV  96  93   MCH  31.2  31.2   MCHC  32.6  33.6   RDW  13.8  13.7   PLT  344  396       Last Basic Metabolic Panel:  Recent Labs   Lab Test 05/15/17  05/06/17   0715   NA  133*  134   POTASSIUM  4.8  4.5   CHLORIDE  103  102   SAMSON  8.7  8.4*   CO2  21*  21   BUN  39*  35*   CR  1.86*  1.73*   GLC  98  91       Liver Function Studies -   Recent Labs   Lab Test  05/02/17   1820  03/21/17   0819   PROTTOTAL  7.5  5.3*   ALBUMIN  3.6  2.6*   BILITOTAL  0.4  0.6   ALKPHOS  147  58   AST  19  12   ALT  23  11       TSH   Date Value Ref Range Status   07/06/2017 2.80 0.30 - 5.00 uIU/mL Final   05/04/2017 8.58 (H) 0.40 - 4.00 mU/L Final     Lab Results   Component Value Date    INR 1.05 03/21/2017       Assessment/Plan:  (E87.1) Hyponatremia  (primary encounter diagnosis)  -Continue sodium chloride and fluid restriction as ordered.   -Redraw sodium level on 7/25/17    (I10) Benign essential hypertension  -Stable on current medication regimen.   -Keep SBP> 130 mmHg and DBP > 65 mmHg (levels below these increase mortality as shown by standard studies and observations).     (G30.1,  F02.80) Late onset Alzheimer's disease without behavioral disturbance  Chronic, progressive.HPI/ROS difficult to obtain with cognitive impairment. Expect further functional and cognitive decline.Monitor weight. Monitor functional status. Monitor for behavioral disturbances.    Orders:  The current medical regimen is effective; continue present plan and medications.      Total time spent with patient visit at the skilled nursing facility was 25 min including patient visit and review of past records. Greater than 50% of total time spent with counseling and coordinating care due to complexity of care    Electronically signed by  BERNARD Peng [facility]

## 2017-07-21 ENCOUNTER — NURSING HOME VISIT (OUTPATIENT)
Dept: GERIATRICS | Facility: CLINIC | Age: 82
End: 2017-07-21
Payer: MEDICARE

## 2017-07-21 VITALS
DIASTOLIC BLOOD PRESSURE: 83 MMHG | RESPIRATION RATE: 18 BRPM | WEIGHT: 132.1 LBS | OXYGEN SATURATION: 98 % | BODY MASS INDEX: 23.4 KG/M2 | SYSTOLIC BLOOD PRESSURE: 160 MMHG | HEART RATE: 70 BPM | TEMPERATURE: 98.2 F

## 2017-07-21 DIAGNOSIS — G30.1 LATE ONSET ALZHEIMER'S DISEASE WITHOUT BEHAVIORAL DISTURBANCE (H): ICD-10-CM

## 2017-07-21 DIAGNOSIS — F02.80 LATE ONSET ALZHEIMER'S DISEASE WITHOUT BEHAVIORAL DISTURBANCE (H): ICD-10-CM

## 2017-07-21 DIAGNOSIS — E87.1 HYPONATREMIA: Primary | ICD-10-CM

## 2017-07-21 DIAGNOSIS — I10 BENIGN ESSENTIAL HYPERTENSION: ICD-10-CM

## 2017-07-21 PROCEDURE — 99309 SBSQ NF CARE MODERATE MDM 30: CPT | Performed by: NURSE PRACTITIONER

## 2017-07-26 ENCOUNTER — TRANSFERRED RECORDS (OUTPATIENT)
Dept: HEALTH INFORMATION MANAGEMENT | Facility: CLINIC | Age: 82
End: 2017-07-26

## 2017-07-26 LAB
ANION GAP SERPL CALCULATED.3IONS-SCNC: 6 MMOL/L (ref 5–18)
BUN SERPL-MCNC: 35 MG/DL (ref 8–28)
CALCIUM SERPL-MCNC: 8.8 MG/DL (ref 8.5–10.5)
CHLORIDE SERPLBLD-SCNC: 101 MMOL/L (ref 98–107)
CO2 SERPL-SCNC: 23 MMOL/L (ref 22–31)
CREAT SERPL-MCNC: 1.81 MG/DL (ref 0.6–1.1)
GFR SERPL CREATININE-BSD FRML MDRD: 26 ML/MIN/1.73M2
GLUCOSE SERPL-MCNC: 92 MG/DL (ref 70–125)
POTASSIUM SERPL-SCNC: 6.1 MMOL/L (ref 3.5–5)
SODIUM SERPL-SCNC: 130 MMOL/L (ref 136–145)

## 2017-08-01 ENCOUNTER — TRANSFERRED RECORDS (OUTPATIENT)
Dept: HEALTH INFORMATION MANAGEMENT | Facility: CLINIC | Age: 82
End: 2017-08-01

## 2017-08-01 LAB — POTASSIUM SERPL-SCNC: 5 MMOL/L (ref 3.5–5)

## 2017-08-08 ENCOUNTER — NURSING HOME VISIT (OUTPATIENT)
Dept: GERIATRICS | Facility: CLINIC | Age: 82
End: 2017-08-08
Payer: COMMERCIAL

## 2017-08-08 VITALS
BODY MASS INDEX: 21.93 KG/M2 | HEART RATE: 72 BPM | RESPIRATION RATE: 18 BRPM | SYSTOLIC BLOOD PRESSURE: 171 MMHG | TEMPERATURE: 98 F | OXYGEN SATURATION: 98 % | DIASTOLIC BLOOD PRESSURE: 72 MMHG | WEIGHT: 123.8 LBS

## 2017-08-08 DIAGNOSIS — E87.1 HYPONATREMIA: ICD-10-CM

## 2017-08-08 DIAGNOSIS — F02.80 LATE ONSET ALZHEIMER'S DISEASE WITHOUT BEHAVIORAL DISTURBANCE (H): ICD-10-CM

## 2017-08-08 DIAGNOSIS — K92.2 UPPER GI BLEED: ICD-10-CM

## 2017-08-08 DIAGNOSIS — N18.4 CKD (CHRONIC KIDNEY DISEASE) STAGE 4, GFR 15-29 ML/MIN (H): ICD-10-CM

## 2017-08-08 DIAGNOSIS — E03.4 HYPOTHYROIDISM DUE TO ACQUIRED ATROPHY OF THYROID: ICD-10-CM

## 2017-08-08 DIAGNOSIS — J30.1 CHRONIC SEASONAL ALLERGIC RHINITIS DUE TO POLLEN: ICD-10-CM

## 2017-08-08 DIAGNOSIS — E87.5 HYPERKALEMIA: Primary | ICD-10-CM

## 2017-08-08 DIAGNOSIS — I10 ESSENTIAL HYPERTENSION: ICD-10-CM

## 2017-08-08 DIAGNOSIS — I10 BENIGN ESSENTIAL HYPERTENSION: ICD-10-CM

## 2017-08-08 DIAGNOSIS — G30.1 LATE ONSET ALZHEIMER'S DISEASE WITHOUT BEHAVIORAL DISTURBANCE (H): ICD-10-CM

## 2017-08-08 PROCEDURE — 99309 SBSQ NF CARE MODERATE MDM 30: CPT | Performed by: INTERNAL MEDICINE

## 2017-08-08 RX ORDER — FERROUS GLUCONATE 324(38)MG
324 TABLET ORAL
Qty: 100 TABLET | Refills: 1
Start: 2017-08-08 | End: 2017-01-01

## 2017-08-08 RX ORDER — FEXOFENADINE HCL 180 MG/1
180 TABLET ORAL
Qty: 30 TABLET
Start: 2017-08-08 | End: 2017-01-01 | Stop reason: DRUGHIGH

## 2017-08-08 RX ORDER — IRBESARTAN 300 MG/1
150 TABLET ORAL DAILY
Qty: 30 TABLET
Start: 2017-08-08 | End: 2017-08-08 | Stop reason: DRUGHIGH

## 2017-08-08 NOTE — PROGRESS NOTES
"Milagros Marie is a 89 year old female seen August 8, 2017 at [facility] where she has resided for 3 months (admit 5/2017) seen to follow up hyponatremia.   Patient is seen in her room, resting abed fully dressed including shoes, late morning.   Disoriented to time, doesn't think she has been up yet or that she is dressed.    States \"I'm okay so far.\"      Shelby is limited by very poor vision.    Ambulates with her wheeled walker, steady gait.    She was living at home with her daughter until March 2017 when she was admitted with GI bleed, treated conservatively with PPI, sucralfate and Fe;  hgb has been stable and no further bleed reported.     She was discharged to TCU, then transferred to [facility] as patient's daughter no longer able to manage patient safely at home.     Readmitted after a fall with LLE pain, and found to be hyponatremic.   Tx'd in hospital with NS and fluid restriction.   Now back to [facility] and settling in well.   Eating okay, weight stable.     Remains on 1500 ccs fluid restriction     Pt has longstanding HTN with CKD stage 4, GFR below 30 >1 year     SBPs recently 124-160         Past Medical History:   Diagnosis Date     ACP (advance care planning)     in media section     Dementia      DJD (degenerative joint disease)      Essential hypertension, benign      Glaucoma      Homocysteinemia (H)      HTN (hypertension)      Osteopenia      Reflux      Renal failure 2008    stage 3-4     Stenosis      Stress incontinence, female      Unspecified hypothyroidism       SH:  Previously lived with her daughter, xiomara in [place].    She attended [facility].       ROS:  Limited, but essentially negative other than above.     EXAM:  Pleasant, NAD  /72  Pulse 72  Temp 98  F (36.7  C)  Resp 18  Wt 123 lb 12.8 oz (56.2 kg)  SpO2 98%  BMI 21.93 kg/m2   Her right cornea is clouded over  Neck supple without adenopathy  Lungs with decreased " BS, no rales or wheeze  Heart RRR s1s2, 2/6 UNA  Abd soft, NT, no distention, +BS  Ext with 1+ edema  Neuro: +STML, disorientation  Psych: affect okay.         Last Basic Metabolic Panel:  Lab Results   Component Value Date     07/26/2017      Lab Results   Component Value Date    POTASSIUM 5.0 08/01/2017     Lab Results   Component Value Date    CHLORIDE 101 07/26/2017     Lab Results   Component Value Date    SAMSON 8.8 07/26/2017     Lab Results   Component Value Date    CO2 23 07/26/2017     Lab Results   Component Value Date    BUN 35 07/26/2017     Lab Results   Component Value Date    CR 1.81 07/26/2017   GFR 26  Lab Results   Component Value Date    GLC 92 07/26/2017     Lab Results   Component Value Date    WBC 9.7 07/18/2017     Lab Results   Component Value Date    HGB 10.3 07/20/2017     Lab Results   Component Value Date    MCV 90 07/18/2017     Lab Results   Component Value Date     07/18/2017     TSH   Date Value Ref Range Status   07/06/2017 2.80 0.30 - 5.00 uIU/mL Final       IMP/PLAN:  (E87.5) Hyperkalemia  (primary encounter diagnosis)  (E87.1) Hyponatremia  Comment: probable hypoaldosteronism, effect of CKD.     Plan: decrease irbesartan to 150 mg/day  Follow BMP.      (N18.4) CKD (chronic kidney disease) stage 4, GFR 15-29 ml/min (H)  (I10) Benign essential hypertension  Comment: goal sbp consistently less than 160  Plan: continue amlodipine and metoprolol; decrease irbesartan to 150 mg daily   D/c magnesium    (M15.0) Primary osteoarthritis involving multiple joints  Comment: knee pain  Plan: scheduled acetaminophen, local measures    Continue calcium and vit D.       (E03.4) Hypothyroidism due to acquired atrophy of thyroid  Comment: on replacement, goal 4-6     Plan: yearly TSH      (I10) Benign essential hypertension  Comment: good control  Plan: continue amlodipine, irbesartan and metoprolol    (K92.2) Upper GI bleed  Comment: resolved; still some anemia, likely related to  CKD  Plan:   Continue daily Protonix, decrease Fe to once daily.        (G30.1,  F02.80) Late onset Alzheimer's disease without behavioral disturbance  Comment: low cognitive scores and functional decline  Plan: Board and Care support for assist with meds, meals, transfers, safety.   Patient is on daily Allegra, presumably for allergies; change to prn to decrease anticholinergic burden.        The health plan new enrollment has happened. I have reviewed the  MDS, the preventative needs,  and facility care plan. The level of care is appropriate. I have reviewed the code status/advanced directives.      Ekaterina Mckeon MD

## 2017-08-23 ENCOUNTER — DOCUMENTATION ONLY (OUTPATIENT)
Dept: OTHER | Facility: CLINIC | Age: 82
End: 2017-08-23

## 2017-08-23 DIAGNOSIS — Z71.89 ACP (ADVANCE CARE PLANNING): Chronic | ICD-10-CM

## 2017-08-31 ENCOUNTER — NURSING HOME VISIT (OUTPATIENT)
Dept: GERIATRICS | Facility: CLINIC | Age: 82
End: 2017-08-31

## 2017-08-31 DIAGNOSIS — Z53.9 ERRONEOUS ENCOUNTER--DISREGARD: ICD-10-CM

## 2017-08-31 DIAGNOSIS — Z71.89 COUNSELING AND COORDINATION OF CARE: Primary | ICD-10-CM

## 2017-08-31 NOTE — MR AVS SNAPSHOT
"              After Visit Summary   2017    Milagros Marie    MRN: [id number]           Patient Information     Date Of Birth          1923        Visit Information        Provider Department      2017 11:14 AM Nathan Phillips MD [facility]        Today's Diagnoses     Counseling and coordination of care    -  1    ERRONEOUS ENCOUNTER--DISREGARD           Follow-ups after your visit        Who to contact     If you have questions or need follow up information about today's clinic visit or your schedule please contact [facility] directly at [phone].  Normal or non-critical lab and imaging results will be communicated to you by [email or web address], letter or phone within 4 business days after the clinic has received the results. If you do not hear from us within 7 days, please contact the clinic through [email or web address] or phone. If you have a critical or abnormal lab result, we will notify you by phone as soon as possible.  Submit refill requests through Myows or call your pharmacy and they will forward the refill request to us. Please allow 3 business days for your refill to be completed.          Additional Information About Your Visit        MyChart Information     Myows lets you send messages to your doctor, view your test results, renew your prescriptions, schedule appointments and more. To sign up, go to [email or web address] . Click on \"Log in\" on the left side of the screen, which will take you to the Welcome page. Then click on \"Sign up Now\" on the right side of the page.     You will be asked to enter the access code listed below, as well as some personal information. Please follow the directions to create your username and password.     Your access code is: [email or web address]  Expires: 2017  7:46 AM     Your access code will  in 90 days. If you need help or a new code, please call your [facility] clinic or [phone].        Care EveryWhere ID     This is " your Care EveryWhere ID. This could be used by other organizations to access your [facility] medical records  [id number]         Blood Pressure from Last 3 Encounters:   08/08/17 171/72   07/21/17 160/83   07/06/17 142/64    Weight from Last 3 Encounters:   08/08/17 56.2 kg (123 lb 12.8 oz)   07/21/17 59.9 kg (132 lb 1.6 oz)   07/06/17 59.9 kg (132 lb 1.6 oz)              Today, you had the following     No orders found for display       Primary Care Provider Office Phone # Fax #    Tamika Merced Phillips [phone]       [address]  [place] [address]        Goals        General    Work with PT to get right knee stronger (pt-stated)     Notes - Note created  5/26/2016  4:20 PM by Nehal Cao, RN    I want to work with physical therapy to get my right knee stronger so I don't feel like I'm going to fall.   I want to go back to walking with my walker only as needed. I don't want to have to use it all the time.         Equal Access to Services     [facility] : Hadii kristin Tripathi, wajeremiahda galina, qaybta kaalmada diane, esther caballero . So [facility] [phone].    ATENCIÓN: Si habla español, tiene a hairston disposición servicios gratuitos de asistencia lingüística. Llame al [phone].    We comply with applicable federal civil rights laws and [place] laws. We do not discriminate on the basis of race, color, national origin, age, disability sex, sexual orientation or gender identity.            Thank you!     Thank you for choosing [facility]  for your care. Our goal is always to provide you with excellent care. Hearing back from our patients is one way we can continue to improve our services. Please take a few minutes to complete the written survey that you may receive in the mail after your visit with us. Thank you!             Your Updated Medication List - Protect others around you: Learn how to safely use, store and throw away your  medicines at www.disposemymeds.org.          This list is accurate as of: 8/31/17 11:59 PM.  Always use your most recent med list.                   Brand Name Dispense Instructions for use Diagnosis    ACETAMINOPHEN EXTRA STRENGTH 500 MG tablet   Generic drug:  acetaminophen      Take 500 mg by mouth 3 times daily        AMLODIPINE BESYLATE PO      Take 10 mg by mouth daily        CALCIUM 600 + D PO      Take 1 tablet by mouth daily        cephALEXin 500 MG capsule    KEFLEX     Take 2,000 mg by mouth as needed Administer one hour prior to dental procedure        COMBIGAN 0.2-0.5 % ophthalmic solution   Generic drug:  brimonidine-timolol      Place 1 drop Into the left eye 2 times daily        dorzolamide 2 % ophthalmic solution    TRUSOPT     Place 1 drop Into the left eye 2 times daily        ferrous gluconate 324 (38 FE) MG tablet    FERGON    100 tablet    Take 1 tablet (324 mg) by mouth daily (with breakfast)    Upper GI bleed       fexofenadine 180 MG tablet    ALLEGRA    30 tablet    Take 1 tablet (180 mg) by mouth nightly as needed    Chronic seasonal allergic rhinitis due to pollen       IRBESARTAN PO      Take 150 mg by mouth 2 times daily        levothyroxine 50 MCG tablet    SYNTHROID/LEVOTHROID    90 tablet    TAKE 1 TABLET (50 MCG) BY MOUTH DAILY    Hypothyroidism due to acquired atrophy of thyroid       magnesium 500 MG Tabs      Take 1 tablet by mouth daily        metoprolol 50 MG tablet    LOPRESSOR    270 tablet    TAKE 1 AND 1/2 TABLET TWICE DAILY WITH FOOD OR MILK    Encounter for medication refill       PANTOPRAZOLE SODIUM PO      Take 40 mg by mouth 2 times daily        PROBIOTIC ADVANCED Caps      Take 1 capsule by mouth daily        SODIUM CHLORIDE PO      Take 1 g by mouth 2 times daily        TRAVATAN 0.004 % ophthalmic solution   Generic drug:  travoprost Z (benzalkonium)      1 drop. One drop in left eye at bedtime.

## 2017-08-31 NOTE — PROGRESS NOTES
[facility]    Chief Complaint   Patient presents with     Care Coordination Nursing Home       HPI:    Milagros Marie is a 89 year old  (9/17/1923), who is being seen today for an episodic care visit at [facility].    HPI information obtained from: facility chart records, facility staff, patient report and [facility] chart review.       Today's concern is:  Counseling and coordination of care  The health plan new enrollment has happened. I have reviewed the  MDS, the preventative needs,  and facility care plan. The level of care is appropriate. I have reviewed the code status/advanced directives.         REVIEW OF SYSTEMS:  {ROS FGS:007902}    There were no vitals taken for this visit.  GENERAL APPEARANCE:  Alert, in no distress  ***    ASSESSMENT/PLAN:  (Z71.89) Counseling and coordination of care  (primary encounter diagnosis)  Comment: ***  Plan: ******    {FGS TIME SPENT:841096}    BERNARD Peng CNP  [facility]

## 2017-09-15 ENCOUNTER — CLINICAL UPDATE (OUTPATIENT)
Dept: PHARMACY | Facility: CLINIC | Age: 82
End: 2017-09-15

## 2017-09-15 NOTE — PROGRESS NOTES
This patient's medication list and chart were reviewed as part of the service provided by [facility].    Assessment/Recommendations:  1. (HTN):  Please note Epic med list indicates pt is currently on Irbesartan 150mg bid, however, appears this was reduced to qd on 8/8/17 from MD due to hyperkalemia - please reconcile.  Please follow up BPs, as well as BMP to ensure K level has come down, and also evaluate Na level (last level was low, and pt currently on NaCl for supplementation).  May also be of benefit to recheck Mg level - (this was dc'd per most recent MD visit, but remains on Epic med list, so should be reconciled).  2. (Anemia):  H/o g.i. Bleed, and Ferrous Gluconate reduced to qd dosing a little over a month ago.  May be of benefit to recheck Hgb level & reassess dose and need of iron supplementation.  May also benefit from reduction in Pantoprazole dose from 40mg bid to 40mg qd.  PPIs increase risk of pneumonia, C.Diff., fractures, hypomagnesemia, and risk increases with dose.      Laurie Umaña, Pharm.D.,[facility] [place] [facility]  Board Certified Geriatric Pharmacist  Medication Therapy Management Pharmacist  [phone]

## 2017-09-25 ENCOUNTER — NURSING HOME VISIT (OUTPATIENT)
Dept: GERIATRICS | Facility: CLINIC | Age: 82
End: 2017-09-25
Payer: COMMERCIAL

## 2017-09-25 VITALS
SYSTOLIC BLOOD PRESSURE: 166 MMHG | HEART RATE: 63 BPM | RESPIRATION RATE: 20 BRPM | OXYGEN SATURATION: 98 % | TEMPERATURE: 97.7 F | WEIGHT: 123.3 LBS | BODY MASS INDEX: 21.84 KG/M2 | DIASTOLIC BLOOD PRESSURE: 80 MMHG

## 2017-09-25 DIAGNOSIS — I10 ESSENTIAL HYPERTENSION: Primary | ICD-10-CM

## 2017-09-25 DIAGNOSIS — K21.9 GASTROESOPHAGEAL REFLUX DISEASE, ESOPHAGITIS PRESENCE NOT SPECIFIED: ICD-10-CM

## 2017-09-25 DIAGNOSIS — D64.89 ANEMIA DUE TO OTHER CAUSE: ICD-10-CM

## 2017-09-25 PROCEDURE — 99309 SBSQ NF CARE MODERATE MDM 30: CPT | Performed by: NURSE PRACTITIONER

## 2017-09-25 NOTE — PROGRESS NOTES
[facility]    Chief Complaint   Patient presents with     Hypertension     Anemia       HPI:    Milagros Marie is a 89 year old  (9/17/1923), who is being seen today for an episodic care visit at [facility].  HPI information obtained from: facility chart records, facility staff and patient report.    Today's concern is:  HTN (hypertension)  -Blood pressure range 129-185/62-97. Currently taking Metoprolol 75 mg/bid, amlodipine 10 mg/day, irbesartan. Denies CP, SOB, or lightheadedness     Gastroesophageal reflux disease, esophagitis presence not specified  -Denies dyspepsia     Anemia    Lab Results   Component Value Date    WBC 9.7 07/18/2017     Lab Results   Component Value Date    RBC 3.06 07/18/2017     Lab Results   Component Value Date    HGB 10.3 07/20/2017     Lab Results   Component Value Date    HCT 27.6 07/18/2017     Lab Results   Component Value Date    MCV 90 07/18/2017     Lab Results   Component Value Date    MCH 31.0 07/18/2017     Lab Results   Component Value Date    MCHC 34.4 07/18/2017     Lab Results   Component Value Date    RDW 14.4 07/18/2017     Lab Results   Component Value Date     07/18/2017         ALLERGIES: Sulfa drugs  Past Medical, Surgical, Family and Social History reviewed and updated in [employer].    Current Outpatient Prescriptions   Medication Sig Dispense Refill     Loteprednol Etabonate (LOTEMAX) 0.5 % OINT opthalmic ointment Place 1 Application into the right eye daily related to UNSPECIFIED GLAUCOMA (H40.9)       fexofenadine (ALLEGRA) 180 MG tablet Take 1 tablet (180 mg) by mouth nightly as needed 30 tablet      ferrous gluconate (FERGON) 324 (38 FE) MG tablet Take 1 tablet (324 mg) by mouth daily (with breakfast) 100 tablet 1     Probiotic Product (PROBIOTIC ADVANCED) CAPS Take 1 capsule by mouth daily       IRBESARTAN PO Take 150 mg by mouth 2 times daily       magnesium 500 MG TABS Take 1 tablet by mouth daily       PANTOPRAZOLE  SODIUM PO Take 20 mg by mouth daily        SODIUM CHLORIDE PO Take 1 g by mouth 2 times daily       levothyroxine (SYNTHROID/LEVOTHROID) 50 MCG tablet TAKE 1 TABLET (50 MCG) BY MOUTH DAILY 90 tablet 2     AMLODIPINE BESYLATE PO Take 10 mg by mouth daily       metoprolol (LOPRESSOR) 50 MG tablet TAKE 1 AND 1/2 TABLET TWICE DAILY WITH FOOD OR MILK 270 tablet 3     cephALEXin (KEFLEX) 500 MG capsule Take 2,000 mg by mouth as needed Administer one hour prior to dental procedure  99     acetaminophen (ACETAMINOPHEN EXTRA STRENGTH) 500 MG tablet Take 500 mg by mouth 3 times daily        COMBIGAN 0.2-0.5 % ophthalmic solution Place 1 drop Into the left eye 2 times daily  4     Calcium Carbonate-Vitamin D (CALCIUM 600 + D OR) Take 1 tablet by mouth daily        Medications reviewed:  Medications reconciled to facility chart and changes were made to reflect current medications as identified as above med list. Below are the changes that were made:   Medications stopped since last EPIC medication reconciliation:   Medications Discontinued During This Encounter   Medication Reason     dorzolamide (TRUSOPT) 2 % ophthalmic solution Discontinued by another Health Care Provider     travoprost Z, benzalkonium, (TRAVATAN) 0.004 % ophthalmic solution Discontinued by another Health Care Provider       Medications started since last [facility] medication reconciliation:  Orders Placed This Encounter   Medications     Loteprednol Etabonate (LOTEMAX) 0.5 % OINT opthalmic ointment     Sig: Place 1 Application into the right eye daily related to UNSPECIFIED GLAUCOMA (H40.9)       REVIEW OF SYSTEMS:  4 point ROS including Respiratory, CV, GI and , other than that noted in the HPI,  is negative    Physical Exam:  /80  Pulse 63  Temp 97.7  F (36.5  C)  Resp 20  Wt 123 lb 4.8 oz (55.9 kg)  SpO2 98%  BMI 21.84 kg/m2  GENERAL APPEARANCE:  Alert, in no distress  RESP:  respiratory effort and palpation of chest normal, lungs clear to  auscultation , no respiratory distress  CV:  Palpation and auscultation of heart done , regular rate and rhythm, no murmur, rub, or gallop  M/S:   Gait and station normal  Digits and nails normal  SKIN:  Inspection of skin and subcutaneous tissue baseline, Palpation of skin and subcutaneous tissue baseline  NEURO:   Cranial nerves 2-12 are normal tested and grossly at patient's baseline  PSYCH:  memory impaired , affect and mood normal     BP Readin/90, 179/85, 155/87        HR:  63-74    Recent Labs:    CBC RESULTS:   Recent Labs   Lab Test 17   WBC   --   9.7  7.3   RBC   --   3.06*  3.80   HGB  10.3*  9.5*  11.6*   HCT   --   27.6*  33.5*   MCV   --   90  88   MCH   --   31.0  30.5   MCHC   --   34.4  34.6   RDW   --   14.4  14.0   PLT   --   325  340       Last Basic Metabolic Panel:  Recent Labs   Lab Test 17   NA   --   130*  129*   POTASSIUM  5.0  6.1*  5.0   CHLORIDE   --   101  97*   SAMSON   --   8.8  9.4   CO2   --   23  21*   BUN   --   35*  36*   CR   --   1.81*  1.81*   GLC   --   92  77       Liver Function Studies -   Recent Labs   Lab Test  17   1820  17   0819   PROTTOTAL  7.5  5.3*   ALBUMIN  3.6  2.6*   BILITOTAL  0.4  0.6   ALKPHOS  147  58   AST  19  12   ALT  23  11       TSH   Date Value Ref Range Status   2017 2.80 0.30 - 5.00 uIU/mL Final   2017 8.58 (H) 0.40 - 4.00 mU/L Final       Assessment/Plan:  (I10) HTN (hypertension)  (primary encounter diagnosis)  -Continue current medications without change at this time.   -BMP next lab day. Irbesartan recently reduced  d/t hyperkalemia   Keep SBP> 130 mmHg and DBP > 65 mmHg (levels below these increase mortality as shown by standard studies and observations).     (D64.9) Anemia  -Hemoglobin stale.   -CBC next day to ensure stability.     (K21.9) Gastroesophageal reflux disease, esophagitis presence not specified  -Asymptomatic  -Reduce Pantoprazole 20  mg/day  -Continue to monitor and notify NP with changes.     Orders:  -see new orders above     Total time spent with patient visit at the skilled nursing facility was 25 min including patient visit and review of past records. Greater than 50% of total time spent with counseling and coordinating care due to Complexity of care    Electronically signed by  BERNARD Richmond CNP

## 2017-09-26 ENCOUNTER — TRANSFERRED RECORDS (OUTPATIENT)
Dept: HEALTH INFORMATION MANAGEMENT | Facility: CLINIC | Age: 82
End: 2017-09-26

## 2017-09-26 LAB
ANION GAP SERPL CALCULATED.3IONS-SCNC: 9 MMOL/L (ref 5–18)
BUN SERPL-MCNC: 30 MG/DL (ref 8–28)
CALCIUM SERPL-MCNC: 9.4 MG/DL (ref 8.5–10.5)
CHLORIDE SERPLBLD-SCNC: 99 MMOL/L (ref 98–107)
CO2 SERPL-SCNC: 25 MMOL/L (ref 22–31)
CREAT SERPL-MCNC: 1.62 MG/DL (ref 0.6–1.1)
DIFFERENTIAL: ABNORMAL
ERYTHROCYTE [DISTWIDTH] IN BLOOD BY AUTOMATED COUNT: 14.6 % (ref 11–14.5)
GFR SERPL CREATININE-BSD FRML MDRD: 30 ML/MIN/1.73M2
GLUCOSE SERPL-MCNC: 85 MG/DL (ref 70–125)
HCT VFR BLD AUTO: 34.5 % (ref 35–47)
HEMOGLOBIN: 11.3 G/DL (ref 12–16)
MCH RBC QN AUTO: 31.1 PG (ref 27–34)
MCHC RBC AUTO-ENTMCNC: 32.8 G/DL (ref 32–36)
MCV RBC AUTO: 95 FL (ref 80–100)
PLATELET # BLD AUTO: 333 THOU/UL (ref 140–440)
POTASSIUM SERPL-SCNC: 4.5 MMOL/L (ref 3.5–5)
RBC # BLD AUTO: 3.63 MILL/UL (ref 3.8–5.4)
SODIUM SERPL-SCNC: 133 MMOL/L (ref 136–145)
WBC # BLD AUTO: 5.9 THOU/UL (ref 4–11)

## 2017-10-10 ENCOUNTER — NURSING HOME VISIT (OUTPATIENT)
Dept: GERIATRICS | Facility: CLINIC | Age: 82
End: 2017-10-10
Payer: COMMERCIAL

## 2017-10-10 VITALS
OXYGEN SATURATION: 97 % | HEART RATE: 69 BPM | BODY MASS INDEX: 21.22 KG/M2 | WEIGHT: 119.8 LBS | DIASTOLIC BLOOD PRESSURE: 98 MMHG | TEMPERATURE: 98.1 F | SYSTOLIC BLOOD PRESSURE: 158 MMHG | RESPIRATION RATE: 20 BRPM

## 2017-10-10 DIAGNOSIS — M15.0 PRIMARY OSTEOARTHRITIS INVOLVING MULTIPLE JOINTS: ICD-10-CM

## 2017-10-10 DIAGNOSIS — K21.9 GASTROESOPHAGEAL REFLUX DISEASE, ESOPHAGITIS PRESENCE NOT SPECIFIED: ICD-10-CM

## 2017-10-10 DIAGNOSIS — G30.1 LATE ONSET ALZHEIMER'S DISEASE WITHOUT BEHAVIORAL DISTURBANCE (H): ICD-10-CM

## 2017-10-10 DIAGNOSIS — F02.80 LATE ONSET ALZHEIMER'S DISEASE WITHOUT BEHAVIORAL DISTURBANCE (H): ICD-10-CM

## 2017-10-10 DIAGNOSIS — I10 ESSENTIAL HYPERTENSION: Primary | ICD-10-CM

## 2017-10-10 DIAGNOSIS — N18.4 CKD (CHRONIC KIDNEY DISEASE) STAGE 4, GFR 15-29 ML/MIN (H): ICD-10-CM

## 2017-10-10 PROCEDURE — 99318 ZZC ANNUAL NURSING FAC ASSESSMNT, STABLE: CPT | Performed by: NURSE PRACTITIONER

## 2017-10-10 NOTE — PROGRESS NOTES
[facility]  Chief Complaint   Patient presents with     Annual Comprehensive Nursing Home       HPI:    Milagros Marie is a 89 year old  (9/17/1923), who is being seen today for an annual comprehensive [facility].  HPI information obtained from: facility chart records, facility staff and patient report.      Today's concerns are:  Essential hypertension  Resident denies CP, SOB, or lightheadedness.   -Currently taking metoprolol 75 mg/bid, irbesartan 150 mg/bid, amlodipine 10 mg/day  -Blood pressure range 148-180/61-90    Late onset Alzheimer's disease without behavioral disturbance  -Last BIMS 08/15  -No recent behaviors noted.     Primary osteoarthritis involving multiple joints  -Denies CP, SOB, lightheadedness   -Currently taking acetaminophen 500 mg/bid    CKD  -See Labs  -  GFR Estimate   Date Value Ref Range Status   09/26/2017 30 (L) >60 ml/min/1.73m2 Final   07/26/2017 26 (L) >60 ml/min/1.73m2 Final   07/20/2017 26 (L) >60 ml/min/1.73m2 Final     GERD  -Denies dyspepsia.   -Currently taking pantoprazole 20 mg/day    Based on JNC-8 goals,  patients age of 89 year old, presence of diabetes or CKD, and goals of care goal BP is <150/90 mm Hg. patient is stable and continue without pharmacological invention with routine assessment.    Depression screen done: PHQ-9 Given screen score and clinical assessment patient is stable without any signs of depression and no futher interventions warrented at this time.    ALLERGIES: Sulfa drugs  PROBLEM LIST:  Patient Active Problem List   Diagnosis     Preventative health care     Fatigue     Osteopenia     [facility]     ACP (advance care planning)     Hypothyroidism due to acquired atrophy of thyroid     Benign essential hypertension     Upper GI bleed     Anemia     Hyponatremia     Late onset Alzheimer's disease without behavioral disturbance     PAST MEDICAL HISTORY:  has a past medical history of ACP (advance care  planning); Dementia; DJD (degenerative joint disease); Essential hypertension, benign; Glaucoma; Homocysteinemia (H); HTN (hypertension); Osteopenia; Reflux; Renal failure (2008); Stenosis; Stress incontinence, female; and Unspecified hypothyroidism.  PAST SURGICAL HISTORY:  has a past surgical history that includes joint replacemtn, knee rt/lt; Hysterectomy; carpal tunnel release rt/lt; cataract iol, rt/lt; Blepharoplasty; Eye surgery; and Bladder surgery.  FAMILY HISTORY: family history includes CANCER in her sister; Cancer - colorectal in her sister; HEART DISEASE in her father and mother.  SOCIAL HISTORY:  reports that she has never smoked. She has never used smokeless tobacco. She reports that she drinks alcohol. She reports that she does not use illicit drugs.  IMMUNIZATIONS:  Most Recent Immunizations   Administered Date(s) Administered     Influenza (IIV3) 10/03/2017     Pneumococcal (PCV 13) 08/28/2015     Pneumococcal 23 valent 07/01/2000     TD (ADULT, 7+) 10/11/2007     Above immunizations pulled from [employer]. [facility] and facility records also reconciled. Outstanding information sent to  to update [employer].  Future immunizations are not needed at this point as all recommended immunizations are up to date.   MEDICATIONS:  Current Outpatient Prescriptions   Medication Sig Dispense Refill     Loteprednol Etabonate (LOTEMAX) 0.5 % OINT opthalmic ointment Place 1 Application into the right eye daily related to UNSPECIFIED GLAUCOMA (H40.9)       fexofenadine (ALLEGRA) 180 MG tablet Take 1 tablet (180 mg) by mouth nightly as needed 30 tablet      ferrous gluconate (FERGON) 324 (38 FE) MG tablet Take 1 tablet (324 mg) by mouth daily (with breakfast) 100 tablet 1     Probiotic Product (PROBIOTIC ADVANCED) CAPS Take 1 capsule by mouth daily       IRBESARTAN PO Take 150 mg by mouth 2 times daily       magnesium 500 MG TABS Take 1 tablet by mouth daily       PANTOPRAZOLE SODIUM PO Take 20 mg  by mouth daily        SODIUM CHLORIDE PO Take 1 g by mouth 2 times daily       levothyroxine (SYNTHROID/LEVOTHROID) 50 MCG tablet TAKE 1 TABLET (50 MCG) BY MOUTH DAILY 90 tablet 2     AMLODIPINE BESYLATE PO Take 10 mg by mouth daily       metoprolol (LOPRESSOR) 50 MG tablet TAKE 1 AND 1/2 TABLET TWICE DAILY WITH FOOD OR MILK 270 tablet 3     cephALEXin (KEFLEX) 500 MG capsule Take 2,000 mg by mouth as needed Administer one hour prior to dental procedure  99     acetaminophen (ACETAMINOPHEN EXTRA STRENGTH) 500 MG tablet Take 500 mg by mouth 3 times daily        COMBIGAN 0.2-0.5 % ophthalmic solution Place 1 drop Into the left eye 2 times daily  4     Calcium Carbonate-Vitamin D (CALCIUM 600 + D OR) Take 1 tablet by mouth daily        Medications reviewed:  Medications reconciled to facility chart and changes were made to reflect current medications as identified as above med list. Below are the changes that were made:   Medications stopped since last EPIC medication reconciliation:   There are no discontinued medications.    Medications started since last [facility] medication reconciliation:  No orders of the defined types were placed in this encounter.        Case Management:  I have reviewed the facility/SNF care plan/MDS which was done ., including the falls risk, nutrition and pain screening. I also reviewed the current immunizations, and preventive care..Future cancer screening is not clinically indicated secondary to age/goals of care Patient's desire to return to the community is present, but is not able due to care needs . Current Level of Care is appropriate.    Advance Directive Discussion:    I reviewed the current advanced directives as reflected in EPIC, the POLST and the facility chart, and verified the congruency of orders. I contacted the first party [employer] and discussed the plan of Care.  I did review the advance directives with the resident.     Team Discussion:  I communicated with the appropriate  disciplines involved with the Plan of Care: All disciplines agree with POC    Patient Goal:  Patient's goal is pain control and comfort.    Information reviewed:  Medications, vital signs, orders, and nursing notes.    ROS:  4 point ROS including Respiratory, CV, GI and , other than that noted in the HPI,  is negative    Exam:  BP (!) 158/98  Pulse 69  Temp 98.1  F (36.7  C)  Resp 20  Wt 119 lb 12.8 oz (54.3 kg)  SpO2 97%  BMI 21.22 kg/m2    GENERAL APPEARANCE:  Alert, in no distress  ENT:  Mouth and posterior oropharynx normal, moist mucous membranes  RESP:  respiratory effort and palpation of chest normal, lungs clear to auscultation , no respiratory distress  CV:  Palpation and auscultation of heart done , regular rate and rhythm, no murmur, rub, or gallop  ABDOMEN:  normal bowel sounds, soft, nontender, no hepatosplenomegaly or other masses  M/S:   Gait and station normal  Digits and nails normal  SKIN:  Inspection of skin and subcutaneous tissue baseline, Palpation of skin and subcutaneous tissue baseline  NEURO:   Cranial nerves 2-12 are normal tested and grossly at patient's baseline  PSYCH:  memory impaired , affect and mood normal     BP Readin/72, 156/66, 154/72            HR:  64-78    Lab/Diagnostic data:    CBC RESULTS:   Recent Labs   Lab Test 17   WBC  5.9   --   9.7   RBC  3.63*   --   3.06*   HGB  11.3*  10.3*  9.5*   HCT  34.5*   --   27.6*   MCV  95   --   90   MCH  31.1   --   31.0   MCHC  32.8   --   34.4   RDW  14.6*   --   14.4   PLT  333   --   325       Last Basic Metabolic Panel:  Recent Labs   Lab Test 17   NA  133*   --   130*   POTASSIUM  4.5  5.0  6.1*   CHLORIDE  99   --   101   SAMSON  9.4   --   8.8   CO2  25   --   23   BUN  30*   --   35*   CR  1.62*   --   1.81*   GLC  85   --   92       Liver Function Studies -   Recent Labs   Lab Test  17   1820  17   0819   PROTTOTAL  7.5  5.3*   ALBUMIN  3.6  2.6*    BILITOTAL  0.4  0.6   ALKPHOS  147  58   AST  19  12   ALT  23  11       TSH   Date Value Ref Range Status   07/06/2017 2.80 0.30 - 5.00 uIU/mL Final   05/04/2017 8.58 (H) 0.40 - 4.00 mU/L Final       ASSESSMENT/PLAN  (I10) Essential hypertension  (primary encounter diagnosis)  Blood pressure controlled on current regimen. No changes at this time and adjustments as needed.   -Keep SBP> 130 mmHg and DBP > 65 mmHg (levels below these increase mortality as shown by standard studies and observations).     (G30.1,  F02.80) Late onset Alzheimer's disease without behavioral disturbance  -Chronic, progressive. HPI/ROS difficult to obtain with cognitive impairment. Expect further functional and cognitive decline. Expect weight loss. Monitor weight. Monitor functional status. Monitor for behavioral disturbances.    (M15.0) Primary osteoarthritis involving multiple joints  Pain managed on scheduled tylenol. No changes at this time.   -Continue to monitor and notify NP with changes.     (N18.4) CKD  -Stable. Avoid nephrotoxic medication and renal dose medications when appropriate.     (K21.9) GERD  Asymptomatic.   -Decrease protonix 20 mg every other day      Orders:    Electronically signed by:  BERNARD Richmond CNP

## 2017-10-12 ENCOUNTER — TRANSFERRED RECORDS (OUTPATIENT)
Dept: HEALTH INFORMATION MANAGEMENT | Facility: CLINIC | Age: 82
End: 2017-10-12

## 2017-10-12 LAB
ANION GAP SERPL CALCULATED.3IONS-SCNC: 9 MMOL/L (ref 5–18)
BUN SERPL-MCNC: 36 MG/DL (ref 8–28)
CALCIUM SERPL-MCNC: 9.5 MG/DL (ref 8.5–10.5)
CHLORIDE SERPLBLD-SCNC: 103 MMOL/L (ref 98–107)
CO2 SERPL-SCNC: 25 MMOL/L (ref 22–31)
CREAT SERPL-MCNC: 1.73 MG/DL (ref 0.6–1.1)
GFR SERPL CREATININE-BSD FRML MDRD: 27 ML/MIN/1.73M2
GLUCOSE SERPL-MCNC: 89 MG/DL (ref 70–125)
POTASSIUM SERPL-SCNC: 4.8 MMOL/L (ref 3.5–5)
SODIUM SERPL-SCNC: 137 MMOL/L (ref 136–145)

## 2017-10-19 RX ORDER — PANTOPRAZOLE SODIUM 20 MG/1
20 TABLET, DELAYED RELEASE ORAL EVERY OTHER DAY
Qty: 30 TABLET
Start: 2017-10-19 | End: 2017-01-01 | Stop reason: DRUGHIGH

## 2017-11-22 NOTE — TELEPHONE ENCOUNTER
[place] [facility] TELEPHONE ENCOUNTER    Chief Complaint   Patient presents with     orders not transcribed       Milagros Marie is a 89 year old  (9/17/1923),Nurse called today to report: they discovered that orders from MD visit in August were not transcribed so she has been receiving Irbesartan  BID instead of daily.  BPs have remained high.  Iron was to be decreased to once daily, but continued on BID.      ASSESSMENT/PLAN  HTN    Since bp has not been low, I continued the BID Ibesartan    Decreased FeSO4    Ordered BMP AND HGB FOR MONDAY    BERNARD Lazcano CNP

## 2017-12-23 NOTE — PROGRESS NOTES
Milagros Marie is a 89 year old female seen December 15, 2017 at [facility] where she has resided for 7 months (admit 5/2017) seen to follow up anemia, HTN, late onset dementia.   Patient is seen in her room, sleeping late morning.   States she is feeling well, no new concerns reported by nursing staff.        Robertson is limited by very poor vision.    Ambulates with her wheeled walker, steady gait.    She was living at home with her daughter until March 2017 when she was admitted with GI bleed, treated conservatively with PPI, sucralfate and Fe;  hgb has been stable and no further bleed reported.     She was discharged to TCU, then transferred to [facility] as patient's daughter no longer able to manage patient safely at home.      Pt has longstanding HTN with CKD stage 4, GFR below 30 >1 year     SBPs recently 124-160         Past Medical History:   Diagnosis Date     ACP (advance care planning)     in media section     Dementia      DJD (degenerative joint disease)      Essential hypertension, benign      Glaucoma      Homocysteinemia (H)      HTN (hypertension)      Osteopenia      Reflux      Renal failure 2008    stage 3-4     Stenosis      Stress incontinence, female      Unspecified hypothyroidism       SH:  Previously lived with her daughter, xiomara in [place].    She attended [facility] program.       ROS:  Limited, but essentially negative other than above.     EXAM:  Pleasant, NAD  /89  Pulse 74  Temp 98.1  F (36.7  C)  Resp 18  Wt 122 lb 3.2 oz (55.4 kg)  SpO2 96%  BMI 21.65 kg/m2   Her right cornea is clouded over  Neck supple without adenopathy  Lungs with decreased BS, no rales or wheeze  Heart RRR s1s2, 2/6 UNA  Abd soft, NT, no distention, +BS  Ext with 1+ edema  Neuro: +STML, disorientation  Psych: affect okay.       Last Basic Metabolic Panel:  Lab Results   Component Value Date     11/28/2017      Lab Results   Component Value Date     POTASSIUM 4.0 11/28/2017     Lab Results   Component Value Date    CHLORIDE 105 11/28/2017     Lab Results   Component Value Date    SAMSON 9.5 11/28/2017     Lab Results   Component Value Date    CO2 25 11/28/2017     Lab Results   Component Value Date    BUN 40 11/28/2017     Lab Results   Component Value Date    CR 1.68 11/28/2017   GFR 28  Lab Results   Component Value Date     11/28/2017     Lab Results   Component Value Date    WBC 5.9 09/26/2017     Component Value Date    HGB 11.3 09/26/2017     Component Value Date    MCV 95 09/26/2017     Component Value Date     09/26/2017     TSH   Date Value Ref Range Status   07/06/2017 2.80 0.30 - 5.00 uIU/mL Final       IMP/PLAN:  (N18.4) CKD (chronic kidney disease) stage 4, GFR 15-29 ml/min (H)  (I10) Benign essential hypertension  Comment: variable bps, GFR has been stable.    Plan: continue amlodipine, irbesartan and metoprolol    (M15.0) Primary osteoarthritis involving multiple joints  Comment: knee pain  Plan: scheduled acetaminophen, local measures    Continue calcium and vit D.       (E03.4) Hypothyroidism due to acquired atrophy of thyroid  Comment: on replacement, goal 4-6     Plan: yearly TSH      (I10) Benign essential hypertension  Comment: good control  Plan: continue amlodipine, irbesartan and metoprolol    (K92.2) Upper GI bleed  Comment: resolved, hgb stable  Plan:   Continue daily Protonix, d/c Fe     (G30.1,  F02.80) Late onset Alzheimer's disease without behavioral disturbance  Comment: low cognitive scores and functional decline  Plan: Board and Care support for assist with meds, meals, transfers, safety.          Ekaterina Mckeon MD

## 2018-01-01 ENCOUNTER — NURSING HOME VISIT (OUTPATIENT)
Dept: GERIATRICS | Facility: CLINIC | Age: 83
End: 2018-01-01
Payer: COMMERCIAL

## 2018-01-01 ENCOUNTER — TRANSFERRED RECORDS (OUTPATIENT)
Dept: HEALTH INFORMATION MANAGEMENT | Facility: CLINIC | Age: 83
End: 2018-01-01

## 2018-01-01 ENCOUNTER — MEDICAL CORRESPONDENCE (OUTPATIENT)
Dept: HEALTH INFORMATION MANAGEMENT | Facility: CLINIC | Age: 83
End: 2018-01-01

## 2018-01-01 ENCOUNTER — RECORDS - HEALTHEAST (OUTPATIENT)
Dept: LAB | Facility: CLINIC | Age: 83
End: 2018-01-01

## 2018-01-01 VITALS
RESPIRATION RATE: 18 BRPM | WEIGHT: 101.1 LBS | SYSTOLIC BLOOD PRESSURE: 135 MMHG | HEIGHT: 63 IN | HEART RATE: 76 BPM | OXYGEN SATURATION: 96 % | BODY MASS INDEX: 17.91 KG/M2 | TEMPERATURE: 98.6 F | DIASTOLIC BLOOD PRESSURE: 72 MMHG

## 2018-01-01 VITALS
WEIGHT: 100.9 LBS | SYSTOLIC BLOOD PRESSURE: 132 MMHG | OXYGEN SATURATION: 98 % | HEIGHT: 63 IN | BODY MASS INDEX: 17.88 KG/M2 | RESPIRATION RATE: 18 BRPM | HEART RATE: 88 BPM | DIASTOLIC BLOOD PRESSURE: 84 MMHG | TEMPERATURE: 98.1 F

## 2018-01-01 VITALS
SYSTOLIC BLOOD PRESSURE: 118 MMHG | BODY MASS INDEX: 18.2 KG/M2 | WEIGHT: 102.7 LBS | DIASTOLIC BLOOD PRESSURE: 72 MMHG | HEART RATE: 62 BPM | HEIGHT: 63 IN | RESPIRATION RATE: 20 BRPM | OXYGEN SATURATION: 95 % | TEMPERATURE: 77 F

## 2018-01-01 VITALS
BODY MASS INDEX: 19.19 KG/M2 | WEIGHT: 108.3 LBS | HEIGHT: 63 IN | RESPIRATION RATE: 20 BRPM | OXYGEN SATURATION: 96 % | TEMPERATURE: 97.8 F | HEART RATE: 64 BPM | DIASTOLIC BLOOD PRESSURE: 67 MMHG | SYSTOLIC BLOOD PRESSURE: 128 MMHG

## 2018-01-01 VITALS
HEART RATE: 91 BPM | RESPIRATION RATE: 16 BRPM | WEIGHT: 90 LBS | TEMPERATURE: 97.8 F | HEIGHT: 63 IN | BODY MASS INDEX: 15.95 KG/M2 | SYSTOLIC BLOOD PRESSURE: 151 MMHG | DIASTOLIC BLOOD PRESSURE: 90 MMHG | OXYGEN SATURATION: 96 %

## 2018-01-01 VITALS
RESPIRATION RATE: 20 BRPM | BODY MASS INDEX: 20.99 KG/M2 | DIASTOLIC BLOOD PRESSURE: 78 MMHG | SYSTOLIC BLOOD PRESSURE: 149 MMHG | WEIGHT: 118.5 LBS | HEART RATE: 78 BPM | TEMPERATURE: 98.2 F | OXYGEN SATURATION: 97 %

## 2018-01-01 VITALS
OXYGEN SATURATION: 96 % | BODY MASS INDEX: 20.67 KG/M2 | WEIGHT: 116.7 LBS | RESPIRATION RATE: 18 BRPM | HEART RATE: 70 BPM | TEMPERATURE: 98.1 F | DIASTOLIC BLOOD PRESSURE: 75 MMHG | SYSTOLIC BLOOD PRESSURE: 134 MMHG

## 2018-01-01 DIAGNOSIS — D50.0 IRON DEFICIENCY ANEMIA DUE TO CHRONIC BLOOD LOSS: ICD-10-CM

## 2018-01-01 DIAGNOSIS — Z51.5 HOSPICE CARE PATIENT: ICD-10-CM

## 2018-01-01 DIAGNOSIS — F02.80 LATE ONSET ALZHEIMER'S DISEASE WITHOUT BEHAVIORAL DISTURBANCE (H): ICD-10-CM

## 2018-01-01 DIAGNOSIS — K21.9 GASTROESOPHAGEAL REFLUX DISEASE, ESOPHAGITIS PRESENCE NOT SPECIFIED: Primary | ICD-10-CM

## 2018-01-01 DIAGNOSIS — I95.2 HYPOTENSION DUE TO DRUGS: Primary | ICD-10-CM

## 2018-01-01 DIAGNOSIS — I12.9 BENIGN HYPERTENSION WITH CHRONIC KIDNEY DISEASE, STAGE III (H): Primary | ICD-10-CM

## 2018-01-01 DIAGNOSIS — K21.9 GASTROESOPHAGEAL REFLUX DISEASE, ESOPHAGITIS PRESENCE NOT SPECIFIED: ICD-10-CM

## 2018-01-01 DIAGNOSIS — M15.0 PRIMARY OSTEOARTHRITIS INVOLVING MULTIPLE JOINTS: ICD-10-CM

## 2018-01-01 DIAGNOSIS — I10 BENIGN ESSENTIAL HYPERTENSION: ICD-10-CM

## 2018-01-01 DIAGNOSIS — G30.1 LATE ONSET ALZHEIMER'S DISEASE WITHOUT BEHAVIORAL DISTURBANCE (H): ICD-10-CM

## 2018-01-01 DIAGNOSIS — N18.30 BENIGN HYPERTENSION WITH CHRONIC KIDNEY DISEASE, STAGE III (H): ICD-10-CM

## 2018-01-01 DIAGNOSIS — N18.30 BENIGN HYPERTENSION WITH CHRONIC KIDNEY DISEASE, STAGE III (H): Primary | ICD-10-CM

## 2018-01-01 DIAGNOSIS — E03.4 HYPOTHYROIDISM DUE TO ACQUIRED ATROPHY OF THYROID: ICD-10-CM

## 2018-01-01 DIAGNOSIS — H54.7 BLINDNESS: ICD-10-CM

## 2018-01-01 DIAGNOSIS — K92.2 UPPER GI BLEED: Primary | ICD-10-CM

## 2018-01-01 DIAGNOSIS — I12.9 BENIGN HYPERTENSION WITH CHRONIC KIDNEY DISEASE, STAGE III (H): ICD-10-CM

## 2018-01-01 DIAGNOSIS — E03.4 HYPOTHYROIDISM DUE TO ACQUIRED ATROPHY OF THYROID: Primary | ICD-10-CM

## 2018-01-01 DIAGNOSIS — N18.4 CKD (CHRONIC KIDNEY DISEASE) STAGE 4, GFR 15-29 ML/MIN (H): Primary | ICD-10-CM

## 2018-01-01 LAB
ANION GAP SERPL CALCULATED.3IONS-SCNC: 10 MMOL/L (ref 5–18)
ANION GAP SERPL CALCULATED.3IONS-SCNC: 10 MMOL/L (ref 5–18)
BUN SERPL-MCNC: 36 MG/DL (ref 8–28)
BUN SERPL-MCNC: 36 MG/DL (ref 8–28)
CALCIUM SERPL-MCNC: 9.6 MG/DL (ref 8.5–10.5)
CALCIUM SERPL-MCNC: 9.6 MG/DL (ref 8.5–10.5)
CHLORIDE BLD-SCNC: 98 MMOL/L (ref 98–107)
CHLORIDE SERPLBLD-SCNC: 98 MMOL/L (ref 98–107)
CO2 SERPL-SCNC: 21 MMOL/L (ref 22–31)
CO2 SERPL-SCNC: 21 MMOL/L (ref 22–31)
CREAT SERPL-MCNC: 1.45 MG/DL (ref 0.6–1.1)
CREAT SERPL-MCNC: 1.46 MG/DL (ref 0.6–1.1)
ERYTHROCYTE [DISTWIDTH] IN BLOOD BY AUTOMATED COUNT: 13.2 % (ref 11–14.5)
ERYTHROCYTE [DISTWIDTH] IN BLOOD BY AUTOMATED COUNT: 13.2 % (ref 11–14.5)
GFR SERPL CREATININE-BSD FRML MDRD: 34 ML/MIN/1.73M2
GFR SERPL CREATININE-BSD FRML MDRD: 34 ML/MIN/1.73M2
GLUCOSE BLD-MCNC: 84 MG/DL (ref 70–125)
GLUCOSE SERPL-MCNC: 84 MG/DL (ref 70–125)
HCT VFR BLD AUTO: 33.1 % (ref 35–47)
HCT VFR BLD AUTO: 33.1 % (ref 35–47)
HEMOGLOBIN: 11.1 G/DL (ref 12–16)
HGB BLD-MCNC: 11.1 G/DL (ref 12–16)
MCH RBC QN AUTO: 32.5 PG (ref 27–34)
MCH RBC QN AUTO: 32.5 PG (ref 27–34)
MCHC RBC AUTO-ENTMCNC: 33.5 G/DL (ref 32–36)
MCHC RBC AUTO-ENTMCNC: 33.5 G/DL (ref 32–36)
MCV RBC AUTO: 97 FL (ref 80–100)
MCV RBC AUTO: 97 FL (ref 80–100)
PLATELET # BLD AUTO: 276 THOU/UL (ref 140–440)
PLATELET # BLD AUTO: 276 THOU/UL (ref 140–440)
PMV BLD AUTO: 9 FL (ref 8.5–12.5)
POTASSIUM BLD-SCNC: 4.9 MMOL/L (ref 3.5–5)
POTASSIUM SERPL-SCNC: 4.9 MMOL/L (ref 3.5–5)
RBC # BLD AUTO: 3.42 MILL/UL (ref 3.8–5.4)
RBC # BLD AUTO: 3.42 MILL/UL (ref 3.8–5.4)
SODIUM SERPL-SCNC: 129 MMOL/L (ref 136–145)
SODIUM SERPL-SCNC: 129 MMOL/L (ref 136–145)
TSH SERPL DL<=0.005 MIU/L-ACNC: 1.55 UIU/ML (ref 0.3–5)
TSH SERPL-ACNC: 1.55 UIU/ML (ref 0.3–5)
WBC # BLD AUTO: 6.3 THOU/UL (ref 4–11)
WBC: 6.3 THOU/UL (ref 4–11)

## 2018-01-01 PROCEDURE — 99309 SBSQ NF CARE MODERATE MDM 30: CPT | Performed by: NURSE PRACTITIONER

## 2018-01-01 PROCEDURE — 99309 SBSQ NF CARE MODERATE MDM 30: CPT | Mod: GW | Performed by: NURSE PRACTITIONER

## 2018-01-01 PROCEDURE — 99308 SBSQ NF CARE LOW MDM 20: CPT | Performed by: INTERNAL MEDICINE

## 2018-01-01 PROCEDURE — 99309 SBSQ NF CARE MODERATE MDM 30: CPT | Performed by: INTERNAL MEDICINE

## 2018-01-01 PROCEDURE — 99318 ZZC ANNUAL NURSING FAC ASSESSMNT, STABLE: CPT | Mod: GW | Performed by: NURSE PRACTITIONER

## 2018-01-01 RX ORDER — POLYVINYL ALCOHOL 14 MG/ML
1 SOLUTION/ DROPS OPHTHALMIC EVERY 4 HOURS PRN
COMMUNITY

## 2018-01-01 RX ORDER — GUAIFENESIN 200 MG/10ML
30 LIQUID ORAL EVERY 4 HOURS PRN
COMMUNITY

## 2018-01-01 RX ORDER — LORAZEPAM 2 MG/ML
CONCENTRATE ORAL
COMMUNITY
Start: 2018-01-01 | End: 2018-11-22

## 2018-01-01 RX ORDER — MORPHINE SULFATE 20 MG/ML
5 SOLUTION ORAL
COMMUNITY

## 2018-01-01 RX ORDER — AMOXICILLIN 250 MG
1 CAPSULE ORAL DAILY
COMMUNITY

## 2018-01-01 RX ORDER — METOPROLOL TARTRATE 75 MG/1
1 TABLET, FILM COATED ORAL 2 TIMES DAILY
COMMUNITY
End: 2018-01-01

## 2018-02-12 NOTE — PROGRESS NOTES
[place] [facility]    Chief Complaint   Patient presents with     penitentiary Regulatory       HPI:    Milagros Marie is a 89 year old  (9/17/1923), who is being seen today for a federally mandated E/M visit at [facility].  HPI information obtained from: facility chart records, facility staff and patient report.     Today's concerns are:  Hypothyroidism due to acquired atrophy of thyroid  TSH   Date Value Ref Range Status   07/06/2017 2.80 0.30 - 5.00 uIU/mL Final   Currently taking levothyroxine 50 mcg/day. Continues to be asymptomatic.     Benign essential hypertension  Per facility documentation, blood pressure range 133-158/64-83. Currently taking Metoprolol tartrate 75 mc/bid, irbesartan 150/bid and amlodipine 10 mg/day. Denies CP, SOB or lightheadedness.     Iron deficiency anemia due to chronic blood loss  Hemoglobin 11.3.       ALLERGIES: Sulfa drugs  PAST MEDICAL HISTORY:  has a past medical history of ACP (advance care planning); Dementia; DJD (degenerative joint disease); Essential hypertension, benign; Glaucoma; Homocysteinemia (H); HTN (hypertension); Osteopenia; Reflux; Renal failure (2008); Stenosis; Stress incontinence, female; and Unspecified hypothyroidism.  PAST SURGICAL HISTORY:  has a past surgical history that includes joint replacemtn, knee rt/lt; Hysterectomy; carpal tunnel release rt/lt; cataract iol, rt/lt; Blepharoplasty; Eye surgery; and Bladder surgery.  FAMILY HISTORY: family history includes CANCER in her sister; Cancer - colorectal in her sister; HEART DISEASE in her father and mother.  SOCIAL HISTORY:  reports that she has never smoked. She has never used smokeless tobacco. She reports that she drinks alcohol. She reports that she does not use illicit drugs.    MEDICATIONS:  Current Outpatient Prescriptions   Medication Sig Dispense Refill     Metoprolol Tartrate 75 MG TABS Take 1 tablet by mouth 2 times daily take with milk or food. Pulse daily. Hold  med if pulse below 60. Notify NP if med held x 2 days.       calcium 600-200 MG-UNIT TABS Take 1 tablet by mouth 2 times daily       cholecalciferol 1000 UNITS TABS Take 1 tablet by mouth daily for Supplement until 03/31/2018       pantoprazole (PROTONIX) 20 MG EC tablet Take 20 mg by mouth daily every 2 days       fexofenadine (ALLEGRA) 180 MG tablet Take 180 mg by mouth every 24 hours as needed       Loteprednol Etabonate (LOTEMAX) 0.5 % OINT opthalmic ointment Place 1 Application into the right eye daily related to UNSPECIFIED GLAUCOMA (H40.9)       IRBESARTAN PO Take 150 mg by mouth 2 times daily       SODIUM CHLORIDE PO Take 1 g by mouth 2 times daily       levothyroxine (SYNTHROID/LEVOTHROID) 50 MCG tablet TAKE 1 TABLET (50 MCG) BY MOUTH DAILY 90 tablet 2     AMLODIPINE BESYLATE PO Take 10 mg by mouth daily       cephALEXin (KEFLEX) 500 MG capsule Take 2,000 mg by mouth as needed Administer one hour prior to dental procedure  99     acetaminophen (ACETAMINOPHEN EXTRA STRENGTH) 500 MG tablet Take 500 mg by mouth 3 times daily        COMBIGAN 0.2-0.5 % ophthalmic solution Place 1 drop Into the left eye 2 times daily  4     [DISCONTINUED] metoprolol (LOPRESSOR) 50 MG tablet TAKE 1 AND 1/2 TABLET TWICE DAILY WITH FOOD OR MILK (Patient taking differently: No sig reported) 270 tablet 3     Medications reviewed:  Medications reconciled to facility chart and changes were made to reflect current medications as identified as above med list. Below are the changes that were made:   Medications stopped since last EPIC medication reconciliation:   Medications Discontinued During This Encounter   Medication Reason     metoprolol (LOPRESSOR) 50 MG tablet Dose adjustment     Calcium Carbonate-Vitamin D (CALCIUM 600 + D OR) Dose adjustment     Probiotic Product (PROBIOTIC ADVANCED) CAPS Discontinued by another Health Care Provider       Medications started since last [facility] medication reconciliation:  Orders Placed This Encounter    Medications     Metoprolol Tartrate 75 MG TABS     Sig: Take 1 tablet by mouth 2 times daily take with milk or food. Pulse daily. Hold med if pulse below 60. Notify NP if med held x 2 days.     calcium 600-200 MG-UNIT TABS     Sig: Take 1 tablet by mouth 2 times daily     cholecalciferol 1000 UNITS TABS     Sig: Take 1 tablet by mouth daily for Supplement until 2018     Case Management:  I have reviewed the care plan and MDS and do agree with the plan. Patient's desire to return to the community is not present.  Information reviewed:  Medications, vital signs, orders, and nursing notes.    ROS:  4 point ROS including Respiratory, CV, GI and , other than that noted in the HPI,  is negative    Exam:  Vitals: /78  Pulse 78  Temp 98.2  F (36.8  C)  Resp 20  Wt 118 lb 8 oz (53.8 kg)  SpO2 97%  BMI 20.99 kg/m2  BMI= Body mass index is 20.99 kg/(m^2).  GENERAL APPEARANCE:  Alert, in no distress  ENT:  Mouth and posterior oropharynx normal, moist mucous membranes  RESP:  respiratory effort and palpation of chest normal, lungs clear to auscultation , no respiratory distress  CV:  Palpation and auscultation of heart done , regular rate and rhythm, no murmur, rub, or gallop  M/S:   Gait and station normal  Digits and nails normal  SKIN:  Inspection of skin and subcutaneous tissue baseline, Palpation of skin and subcutaneous tissue baseline  NEURO:   Cranial nerves 2-12 are normal tested and grossly at patient's baseline     BP Readin/70, 148/76, 133/60                HR:  61-78    Lab/Diagnostic data:   CBC RESULTS:   Recent Labs   Lab Test 17   WBC  5.9   --   9.7   RBC  3.63*   --   3.06*   HGB  11.3*  10.3*  9.5*   HCT  34.5*   --   27.6*   MCV  95   --   90   MCH  31.1   --   31.0   MCHC  32.8   --   34.4   RDW  14.6*   --   14.4   PLT  333   --   325       Last Basic Metabolic Panel:  Recent Labs   Lab Test 11/28/17 10/12/17   NA  137  137   POTASSIUM  4.0  4.8    CHLORIDE  105  103   SAMSON  9.5  9.5   CO2  25  25   BUN  40*  36*   CR  1.68*  1.73*   GLC  112  89       Liver Function Studies -   Recent Labs   Lab Test  05/02/17   1820  03/21/17   0819   PROTTOTAL  7.5  5.3*   ALBUMIN  3.6  2.6*   BILITOTAL  0.4  0.6   ALKPHOS  147  58   AST  19  12   ALT  23  11       TSH   Date Value Ref Range Status   07/06/2017 2.80 0.30 - 5.00 uIU/mL Final   05/04/2017 8.58 (H) 0.40 - 4.00 mU/L Final           ASSESSMENT/PLAN  (E03.4) Hypothyroidism due to acquired atrophy of thyroid  (primary encounter diagnosis)  On replacement. Continue synthroid as ordered. No future labs; resident is enrolled with [facility] with POC focused on comfort.     (I10) Benign essential hypertension  Chronic, controlled on current medication regimen. No changes at this time and adjustments as clinically indicated.     (D50.0) Iron deficiency anemia due to chronic blood loss  Stable/improved hemoglobin. Continue to monitor for changed and notify NP/Hospice RN with changes.     Orders:  The current medical regimen is effective; continue present plan and medications.      Electronically signed by:  BERNARD Peng CNP

## 2018-05-05 PROBLEM — N18.30 BENIGN HYPERTENSION WITH CHRONIC KIDNEY DISEASE, STAGE III (H): Status: ACTIVE | Noted: 2018-01-01

## 2018-05-05 PROBLEM — I12.9 BENIGN HYPERTENSION WITH CHRONIC KIDNEY DISEASE, STAGE III (H): Status: ACTIVE | Noted: 2018-01-01

## 2018-05-06 NOTE — PROGRESS NOTES
Milagros Marie is a 89 year old female seen December 15, 2017 at [facility] where she has resided for 7 months (admit 5/2017) seen to follow up anemia, HTN, late onset dementia.   Patient is seen in her room, up ambulating with her walker, but has worked herself into a corner and cannot find her way out.   She is legally blind, a lot of difficulty getting around without direct assistance.     She was living at home with her daughter until March 2017 when she was admitted with GI bleed, treated conservatively with PPI, sucralfate and Fe;  hgb has been stable and no further bleed reported.     She was discharged to TCU, then transferred to [facility] as patient's daughter no longer able to manage patient safely at home.      Pt has longstanding HTN with CKD stage 4, GFR below 30 >1 year         Past Medical History:   Diagnosis Date     ACP (advance care planning)     in media section     Dementia      DJD (degenerative joint disease)      Essential hypertension, benign      Glaucoma      Homocysteinemia (H)      HTN (hypertension)      Osteopenia      Reflux      Renal failure 2008    stage 3-4     Stenosis      Stress incontinence, female      Unspecified hypothyroidism       SH:  Previously lived with her daughterxiomara in [place].    She attended [facility] program.       ROS:  Limited, but essentially negative other than above.     EXAM:  Pleasant, NAD  /75  Pulse 70  Temp 98.1  F (36.7  C)  Resp 18  Wt 116 lb 11.2 oz (52.9 kg)  SpO2 96%  BMI 20.67 kg/m2   Her right cornea is clouded over  Neck supple without adenopathy  Lungs with decreased BS, no rales or wheeze  Heart RRR s1s2, 2/6 UNA  Abd soft, NT, no distention, +BS  Ext with 1+ edema  Neuro: +STML, disorientation, blindness.  Psych: affect okay.       Labs reviewed.     IMP/PLAN:  (I12.9,  N18.3) Benign hypertension with chronic kidney disease, stage III  Comment: variable bps, GFR stable.    BP Readings from  Last 3 Encounters:   04/27/18 134/75   02/12/18 149/78   12/16/17 179/89    Plan: continue amlodipine, irbesartan and metoprolol    (M15.0) Primary osteoarthritis involving multiple joints  Comment: knee pain  Plan: scheduled acetaminophen, local measures    Continue calcium and vit D.       (E03.4) Hypothyroidism due to acquired atrophy of thyroid  Comment:   TSH   Date Value Ref Range Status   07/06/2017 2.80 0.30 - 5.00 uIU/mL Final     Plan: yearly TSH      (K92.2) Upper GI bleed  Comment: resolved, hgb stable  Plan: would decrease pantoprazole to once daily.        (G30.1,  F02.80) Late onset Alzheimer's disease without behavioral disturbance  Comment: low cognitive scores and functional decline, worsened by vision loss.     Plan: Board and Care support for assist with meds, meals, transfers, safety.      High fall risk, gets lost in her room, likely needs higher level of care.       Ekaterina Mckeon MD

## 2018-06-12 NOTE — PROGRESS NOTES
[place] [facility]    Chief Complaint   Patient presents with     custodial Regulatory       [place] Medical Record Number:  [id number]    HPI:    Milagros Marie is a 89 year old  (9/17/1923), who is being seen today for a federally mandated E/M visit at [facility].  HPI information obtained from: facility chart records, facility staff, patient report and [place] Epic chart review.     Today's concerns are:   Diagnosis Comments   1. CKD (chronic kidney disease) stage 4, GFR 15-29 ml/min (H)  At baseline creatinine 1.62-1.81   2. Benign essential hypertension  Per facility chart review, blood pressure range 100-145/58-76. Denies CP, SOB or lightheadedness. Currently taking amlodipine, irbesartan, and metorpolol    3. Hypothyroidism due to acquired atrophy of thyroid  TSH   Date Value Ref Range Status   07/06/2017 2.80 0.30 - 5.00 uIU/mL Final   Currently taking levothyroxine 50 mcg/day.      ALLERGIES: Sulfa drugs  PAST MEDICAL HISTORY:  has a past medical history of ACP (advance care planning); Dementia; DJD (degenerative joint disease); Essential hypertension, benign; Glaucoma; Homocysteinemia (H); HTN (hypertension); Osteopenia; Reflux; Renal failure (2008); Stenosis; Stress incontinence, female; and Unspecified hypothyroidism.  PAST SURGICAL HISTORY:  has a past surgical history that includes joint replacemtn, knee rt/lt; Hysterectomy; carpal tunnel release rt/lt; cataract iol, rt/lt; Blepharoplasty; Eye surgery; and Bladder surgery.  FAMILY HISTORY: family history includes CANCER in her sister; Cancer - colorectal in her sister; HEART DISEASE in her father and mother.  SOCIAL HISTORY:  reports that she has never smoked. She has never used smokeless tobacco. She reports that she drinks alcohol. She reports that she does not use illicit drugs.    MEDICATIONS:  Current Outpatient Prescriptions   Medication Sig Dispense Refill     acetaminophen (ACETAMINOPHEN EXTRA STRENGTH) 500  MG tablet Take 500 mg by mouth 3 times daily        AMLODIPINE BESYLATE PO Take 10 mg by mouth daily       cephALEXin (KEFLEX) 500 MG capsule Take 2,000 mg by mouth as needed Administer one hour prior to dental procedure  99     COMBIGAN 0.2-0.5 % ophthalmic solution Place 1 drop Into the left eye 2 times daily  4     fexofenadine (ALLEGRA) 180 MG tablet Take 180 mg by mouth every 24 hours as needed       IRBESARTAN PO Take 150 mg by mouth 2 times daily       levothyroxine (SYNTHROID/LEVOTHROID) 50 MCG tablet TAKE 1 TABLET (50 MCG) BY MOUTH DAILY 90 tablet 2     Loteprednol Etabonate (LOTEMAX) 0.5 % OINT opthalmic ointment Place 1 Application into the right eye daily related to UNSPECIFIED GLAUCOMA (H40.9)       Metoprolol Tartrate 75 MG TABS Take 1 tablet by mouth 2 times daily take with milk or food. Pulse daily. Hold med if pulse below 60. Notify NP if med held x 2 days.       pantoprazole (PROTONIX) 20 MG EC tablet Take 20 mg by mouth daily every 2 days       senna-docusate (SENOKOT-S;PERICOLACE) 8.6-50 MG per tablet Take 1 tablet by mouth 2 times daily       Medications reviewed:  Medications reconciled to facility chart and changes were made to reflect current medications as identified as above med list. Below are the changes that were made:   Medications stopped since last EPIC medication reconciliation:   Medications Discontinued During This Encounter   Medication Reason     calcium 600-200 MG-UNIT TABS Discontinued by another Health Care Provider     SODIUM CHLORIDE PO Discontinued by another Health Care Provider       Medications started since last [facility] medication reconciliation:  No orders of the defined types were placed in this encounter.    Case Management:  I have reviewed the care plan and MDS and do agree with the plan. Patient's desire to return to the community is not present.  Information reviewed:  Medications, vital signs, orders, and nursing notes.    ROS:  4 point ROS including Respiratory,  "CV, GI and , other than that noted in the HPI,  is negative    Exam:  Vitals: /67  Pulse 64  Temp 97.8  F (36.6  C)  Resp 20  Ht 5' 3\" (1.6 m)  Wt 108 lb 4.8 oz (49.1 kg)  SpO2 96%  BMI 19.18 kg/m2  BMI= Body mass index is 19.18 kg/(m^2).  GENERAL APPEARANCE:  Alert, in no distress  ENT:  Mouth and posterior oropharynx normal, moist mucous membranes, [place]  RESP:  respiratory effort and palpation of chest normal, lungs clear to auscultation   CV:  Palpation and auscultation of heart done , regular rate and rhythm, no murmur, rub, or gallop  M/S:   Gait and station normal  Digits and nails normal  SKIN:  Inspection of skin and subcutaneous tissue baseline, Palpation of skin and subcutaneous tissue baseline  NEURO:   Cranial nerves 2-12 are normal tested and grossly at patient's baseline     BP Reading:                                     HR: 61-78  126/70  114/66  130/62    Lab/Diagnostic data:   CBC RESULTS:   Recent Labs   Lab Test 09/26/17 07/20/17 07/18/17   WBC  5.9   --   9.7   RBC  3.63*   --   3.06*   HGB  11.3*  10.3*  9.5*   HCT  34.5*   --   27.6*   MCV  95   --   90   MCH  31.1   --   31.0   MCHC  32.8   --   34.4   RDW  14.6*   --   14.4   PLT  333   --   325       Last Basic Metabolic Panel:  Recent Labs   Lab Test 11/28/17 10/12/17   NA  137  137   POTASSIUM  4.0  4.8   CHLORIDE  105  103   SAMSON  9.5  9.5   CO2  25  25   BUN  40*  36*   CR  1.68*  1.73*   GLC  112  89       Liver Function Studies -   Recent Labs   Lab Test  05/02/17   1820  03/21/17   0819   PROTTOTAL  7.5  5.3*   ALBUMIN  3.6  2.6*   BILITOTAL  0.4  0.6   ALKPHOS  147  58   AST  19  12   ALT  23  11       TSH   Date Value Ref Range Status   07/06/2017 2.80 0.30 - 5.00 uIU/mL Final   05/04/2017 8.58 (H) 0.40 - 4.00 mU/L Final         ASSESSMENT/PLAN  (N18.4) CKD (chronic kidney disease) stage 4, GFR 15-29 ml/min (H)  (primary encounter diagnosis)  Chronic, at baseline creatinine. Continue to avoid nephrotoxic medications " and renal dose when appropriate.   -BMP next lab day     (I10) Benign essential hypertension  Chronic, low bps.   --Will reduce amlodipine 5 mg/day.   -Continue to monitor and notify NP with changes.   -Keep SBP> 130 mmHg and DBP > 65 mmHg (levels below these increase mortality as shown by standard studies and observations).       (E03.4) Hypothyroidism due to acquired atrophy of thyroid  TSH   Date Value Ref Range Status   07/06/2017 2.80 0.30 - 5.00 uIU/mL Final   Check TSH yearly and PRN, and keep level b/w 4-5 in elderly.        Orders:  See new orders above         Electronically signed by:  BERNARD Peng CNP

## 2018-06-18 PROBLEM — N18.4 CKD (CHRONIC KIDNEY DISEASE) STAGE 4, GFR 15-29 ML/MIN (H): Status: ACTIVE | Noted: 2018-01-01

## 2018-06-18 PROBLEM — E46 PROTEIN-CALORIE MALNUTRITION (H): Status: ACTIVE | Noted: 2018-01-01

## 2018-07-12 NOTE — PROGRESS NOTES
"[place] [facility]    Chief Complaint   Patient presents with     Nursing Home Acute       HPI:    Milagros Marie is a 89 year old  (9/17/1923), who is being seen today for an episodic care visit at [facility].    HPI information obtained from:   facility staff. Today's concern is:  1. Hypotension due to drugs    2. Hospice care patient     Notified by staff today that blood pressures have been in the systolic range of 80-90's. She has had a limited oral intake and has been spending more time in the bed. Upon entering the room today, resident was sleeping, appeared very comfortable.  She was 102 lb yesterday, 5 lbs less than from one week ago.     Weights:  107.2 lbs  119.6 lbs  111.0 lbs    REVIEW OF SYSTEMS:  Unobtainable secondary to cognitive impairment.     /72  Pulse 62  Temp (!) 77  F (25  C)  Resp 20  Ht 5' 3\" (1.6 m)  Wt 102 lb 11.2 oz (46.6 kg)  SpO2 95%  BMI 18.19 kg/m2  GENERAL APPEARANCE:  Alert, in no distress, comfortable  RESP: deep depth of respirations, regular rate  PSYCH: calm and comfortable.     ASSESSMENT/PLAN:  (I95.2) Hypotension due to drugs  (primary encounter diagnosis)  (Z51.5) Hospice care patient  Comment: appears to be entering the end stages of life. Has had a significant weight loss over the past week with little oral intake, now decreased blood pressure. Still on a moderate amount of medications which I would recommend discontinuing at this point but will confirm with hospice and family. For now, will D/C irbesartan.    Electronically signed by:  BERNARD Rodriguez CNP  "

## 2018-07-12 NOTE — LETTER
"    7/12/2018        RE: Milagros Marie  [address]  [facility] [id number]        [place] [facility]    Chief Complaint   Patient presents with     Nursing Home Acute       HPI:    Milagros Marie is a 89 year old  (9/17/1923), who is being seen today for an episodic care visit at [facility].    HPI information obtained from:   facility staff. Today's concern is:  1. Hypotension due to drugs    2. Hospice care patient     Notified by staff today that blood pressures have been in the systolic range of 80-90's. She has had a limited oral intake and has been spending more time in the bed. Upon entering the room today, resident was sleeping, appeared very comfortable.  She was 102 lb yesterday, 5 lbs less than from one week ago.     Weights:  107.2 lbs  119.6 lbs  111.0 lbs    REVIEW OF SYSTEMS:  Unobtainable secondary to cognitive impairment.     /72  Pulse 62  Temp (!) 77  F (25  C)  Resp 20  Ht 5' 3\" (1.6 m)  Wt 102 lb 11.2 oz (46.6 kg)  SpO2 95%  BMI 18.19 kg/m2  GENERAL APPEARANCE:  Alert, in no distress, comfortable  RESP: deep depth of respirations, regular rate  PSYCH: calm and comfortable.     ASSESSMENT/PLAN:  (I95.2) Hypotension due to drugs  (primary encounter diagnosis)  (Z51.5) Hospice care patient  Comment: appears to be entering the end stages of life. Has had a significant weight loss over the past week with little oral intake, now decreased blood pressure. Still on a moderate amount of medications which I would recommend discontinuing at this point but will confirm with hospice and family. For now, will D/C irbesartan.    Electronically signed by:  BERNARD Rodriguez CNP      Sincerely,        BERNARD Flores CNP    "

## 2018-09-14 NOTE — LETTER
"    9/14/2018        RE: Milagros Marie  [address]  [facility] [address]        Milagros Marie is a 89 year old female seen September 14, 2018 at [facility] where she has resided for one year (admit to [facility] 5/2017) seen to follow up anemia, HTN, late onset dementia.   Patient is seen in her room, up to .     She is legally blind, a lot of difficulty getting around without direct assistance.    States she feels okay, says no to pain or dyspnea.     She was living at home with her daughter until March 2017 when she was admitted with GI bleed, treated conservatively with PPI, sucralfate and Fe;  hgb has been stable and no further bleed reported.     She was discharged to TCU, then transferred to [facility] as patient's daughter no longer able to manage patient safely at home.  Now moved to LTC due to higher care needs.     She has lost considerable weight, is now on Hospice for comfort focus.         Past Medical History:   Diagnosis Date     ACP (advance care planning)     in media section     Dementia      DJD (degenerative joint disease)      Essential hypertension, benign      Glaucoma      Homocysteinemia (H)      HTN (hypertension)      Osteopenia      Reflux      Renal failure 2008    stage 3-4     Stenosis      Stress incontinence, female      Unspecified hypothyroidism       SH:  Previously lived with her daughter, xiomara in [place].    She attended [facility] Adult Day program.       ROS:  Limited, but essentially negative other than above.   Wt Readings from Last 5 Encounters:   08/10/18 100 lb 14.4 oz (45.8 kg)   07/12/18 102 lb 11.2 oz (46.6 kg)   06/12/18 108 lb 4.8 oz (49.1 kg)   04/27/18 116 lb 11.2 oz (52.9 kg)   02/12/18 118 lb 8 oz (53.8 kg)        EXAM:  Pleasant, NAD  /84  Pulse 88  Temp 98.1  F (36.7  C)  Resp 18  Ht 5' 3\" (1.6 m)  Wt 100 lb 14.4 oz (45.8 kg)  SpO2 98%  BMI 17.87 kg/m2   Her right cornea is clouded over  Neck supple without " adenopathy  Lungs with decreased BS, no rales or wheeze  Heart RRR s1s2, 2/6 UNA  Abd soft, NT, no distention, +BS  Ext with 1+ edema  Neuro: +STML, disorientation, blindness.  Psych: affect okay.       Last Comprehensive Metabolic Panel:  Sodium   Date Value Ref Range Status   06/19/2018 129 (A) 136 - 145 mmol/L Final     Potassium   Date Value Ref Range Status   06/19/2018 4.9 3.5 - 5.0 mmol/L Final     Chloride   Date Value Ref Range Status   06/19/2018 98 98 - 107 mmol/L Final     Carbon Dioxide   Date Value Ref Range Status   06/19/2018 21 (A) 22 - 31 mmol/L Final     Anion Gap   Date Value Ref Range Status   06/19/2018 10 5 - 18 mmol/L Final     Glucose   Date Value Ref Range Status   06/19/2018 84 70 - 125 mg/dL Final     Urea Nitrogen   Date Value Ref Range Status   06/19/2018 36 (A) 8 - 28 mg/dL Final     Creatinine   Date Value Ref Range Status   06/19/2018 1.46 (A) 0.60 - 1.10 mg/dL Final     GFR Estimate   Date Value Ref Range Status   06/19/2018 34 (L) >60 mL/min/1.73m2 Final     Calcium   Date Value Ref Range Status   06/19/2018 9.6 8.5 - 10.5 mg/dL Final     Lab Results   Component Value Date    WBC 6.3 06/19/2018      HGB 11.1 06/19/2018      MCV 97 06/19/2018       06/19/2018       IMP/PLAN:  (I12.9,  N18.3) Benign hypertension with chronic kidney disease, stage III  Comment:  GFR stable.    BP Readings from Last 3 Encounters:   08/10/18 132/84   07/12/18 118/72   06/12/18 128/67    Plan: bps lower with weight loss and decreased activity.   Now off anti-hypertensives         (M15.0) Primary osteoarthritis involving multiple joints  Comment: knee pain  Plan: scheduled acetaminophen, local measures      (Z51.5) Hospice care patient  Comment: comfort focus     Plan: all medications not related to comfort have been stopped;  prns available      Ekaterina Mcekon MD       Sincerely,        Ekaterina Mckeon MD

## 2018-09-24 NOTE — PROGRESS NOTES
"Milagros Marie is a 89 year old female seen September 14, 2018 at [facility] where she has resided for one year (admit to [facility] 5/2017) seen to follow up anemia, HTN, late onset dementia.   Patient is seen in her room, up to .     She is legally blind, a lot of difficulty getting around without direct assistance.    States she feels okay, says no to pain or dyspnea.     She was living at home with her daughter until March 2017 when she was admitted with GI bleed, treated conservatively with PPI, sucralfate and Fe;  hgb has been stable and no further bleed reported.     She was discharged to TCU, then transferred to [facility] as patient's daughter no longer able to manage patient safely at home.  Now moved to LTC due to higher care needs.     She has lost considerable weight, is now on Hospice for comfort focus.         Past Medical History:   Diagnosis Date     ACP (advance care planning)     in media section     Dementia      DJD (degenerative joint disease)      Essential hypertension, benign      Glaucoma      Homocysteinemia (H)      HTN (hypertension)      Osteopenia      Reflux      Renal failure 2008    stage 3-4     Stenosis      Stress incontinence, female      Unspecified hypothyroidism       SH:  Previously lived with her daughter, xiomara in [place].    She attended [facility] Adult Day program.       ROS:  Limited, but essentially negative other than above.   Wt Readings from Last 5 Encounters:   08/10/18 100 lb 14.4 oz (45.8 kg)   07/12/18 102 lb 11.2 oz (46.6 kg)   06/12/18 108 lb 4.8 oz (49.1 kg)   04/27/18 116 lb 11.2 oz (52.9 kg)   02/12/18 118 lb 8 oz (53.8 kg)        EXAM:  Pleasant, NAD  /84  Pulse 88  Temp 98.1  F (36.7  C)  Resp 18  Ht 5' 3\" (1.6 m)  Wt 100 lb 14.4 oz (45.8 kg)  SpO2 98%  BMI 17.87 kg/m2   Her right cornea is clouded over  Neck supple without adenopathy  Lungs with decreased BS, no rales or wheeze  Heart RRR s1s2, 2/6 UNA  Abd soft, NT, no " distention, +BS  Ext with 1+ edema  Neuro: +STML, disorientation, blindness.  Psych: affect okay.       Last Comprehensive Metabolic Panel:  Sodium   Date Value Ref Range Status   06/19/2018 129 (A) 136 - 145 mmol/L Final     Potassium   Date Value Ref Range Status   06/19/2018 4.9 3.5 - 5.0 mmol/L Final     Chloride   Date Value Ref Range Status   06/19/2018 98 98 - 107 mmol/L Final     Carbon Dioxide   Date Value Ref Range Status   06/19/2018 21 (A) 22 - 31 mmol/L Final     Anion Gap   Date Value Ref Range Status   06/19/2018 10 5 - 18 mmol/L Final     Glucose   Date Value Ref Range Status   06/19/2018 84 70 - 125 mg/dL Final     Urea Nitrogen   Date Value Ref Range Status   06/19/2018 36 (A) 8 - 28 mg/dL Final     Creatinine   Date Value Ref Range Status   06/19/2018 1.46 (A) 0.60 - 1.10 mg/dL Final     GFR Estimate   Date Value Ref Range Status   06/19/2018 34 (L) >60 mL/min/1.73m2 Final     Calcium   Date Value Ref Range Status   06/19/2018 9.6 8.5 - 10.5 mg/dL Final     Lab Results   Component Value Date    WBC 6.3 06/19/2018      HGB 11.1 06/19/2018      MCV 97 06/19/2018       06/19/2018       IMP/PLAN:  (I12.9,  N18.3) Benign hypertension with chronic kidney disease, stage III  Comment:  GFR stable.    BP Readings from Last 3 Encounters:   08/10/18 132/84   07/12/18 118/72   06/12/18 128/67    Plan: bps lower with weight loss and decreased activity.   Now off anti-hypertensives         (M15.0) Primary osteoarthritis involving multiple joints  Comment: knee pain  Plan: scheduled acetaminophen, local measures      (Z51.5) Hospice care patient  Comment: comfort focus     Plan: all medications not related to comfort have been stopped;  prns available      Ekaterina Mckeon MD

## 2018-10-05 NOTE — PROGRESS NOTES
[facility]  Chief Complaint   Patient presents with     Annual Comprehensive Nursing Home       [place] Medical Record Number:  [id number]  Place of Service where encounter took place:  [facility] ([facility]) [[id number]]      HPI:    Milagros Marie is a 89 year old  (1923), who is being seen today for an annual comprehensive visit.  HPI information obtained from: facility chart records, facility staff and patient report.      Today's concerns are:    ICD-10-CM    1. Benign hypertension with chronic kidney disease, stage III (H) I12.9     N18.3    2. Late onset Alzheimer's disease without behavioral disturbance G30.1     F02.80    3. Primary osteoarthritis involving multiple joints M15.0    4. Hypothyroidism due to acquired atrophy of thyroid E03.4    5. Gastroesophageal reflux disease, esophagitis presence not specified K21.9    6. Hospice care patient Z51.5      Resident sleeping in bed upon today's exam. Daughter Chantell present at bedside and reports her mom states she is not ready to go at this time. Appears comfortable in bed. Continues to eat and drink <25% of food/liquids.   -Listening to Sami music as she has always enjoyed Oktoberfest in the past.   -Admitted to hospice 18 with POC focused on comfort.     Based on JNC-8 goals,  patients age of 89 year old, presence of diabetes or CKD, and goals of care goal BP is <150/90 mm Hg. Resident hospice patient. No longer taking antihypertensive agents. .    BP Readin/72  137/83  130/59    HR:  76-88  Wt Readings from Last 3 Encounters:   10/05/18 101 lb 1.6 oz (45.9 kg)   08/10/18 100 lb 14.4 oz (45.8 kg)   18 102 lb 11.2 oz (46.6 kg)     ALLERGIES: Sulfa drugs  PROBLEM LIST:  Patient Active Problem List   Diagnosis     Preventative health care     Fatigue     Osteopenia     Health Care Home     ACP (advance care planning)     Hypothyroidism due to acquired atrophy of thyroid     Benign essential hypertension      Upper GI bleed     Anemia     Hyponatremia     Late onset Alzheimer's disease without behavioral disturbance     Benign hypertension with chronic kidney disease, stage III (H)     CKD (chronic kidney disease) stage 4, GFR 15-29 ml/min (H)     Protein-calorie malnutrition (H)     PAST MEDICAL HISTORY:  has a past medical history of ACP (advance care planning); Dementia; DJD (degenerative joint disease); Essential hypertension, benign; Glaucoma; Homocysteinemia (H); HTN (hypertension); Osteopenia; Reflux; Renal failure (2008); Stenosis; Stress incontinence, female; and Unspecified hypothyroidism.  PAST SURGICAL HISTORY:  has a past surgical history that includes joint replacemtn, knee rt/lt; Hysterectomy; carpal tunnel release rt/lt; cataract iol, rt/lt; Blepharoplasty; Eye surgery; and Bladder surgery.  FAMILY HISTORY: family history includes Cancer in her sister; Cancer - colorectal in her sister; HEART DISEASE in her father and mother.  SOCIAL HISTORY:  reports that she has never smoked. She has never used smokeless tobacco. She reports that she drinks alcohol. She reports that she does not use illicit drugs.  IMMUNIZATIONS:  Most Recent Immunizations   Administered Date(s) Administered     Influenza (IIV3) PF 10/03/2017     Pneumo Conj 13-V (2010&after) 08/28/2015     Pneumococcal 23 valent 03/30/2017     TD (ADULT, 7+) 07/25/2000     Above immunizations pulled from [facility]. [facility] and facility records also reconciled. Outstanding information sent to  to update [facility] .  Future immunizations are not needed at this point as all recommended immunizations are up to date.   MEDICATIONS:  Current Outpatient Prescriptions   Medication Sig Dispense Refill     acetaminophen (ACETAMINOPHEN EXTRA STRENGTH) 500 MG tablet Take 1,000 mg by mouth 3 times daily        cephALEXin (KEFLEX) 500 MG capsule Take 2,000 mg by mouth as needed Administer one hour prior to dental procedure  99     COMBIGAN  0.2-0.5 % ophthalmic solution Place 1 drop Into the left eye 2 times daily  4     guaiFENesin (ROBITUSSIN) 100 MG/5ML LIQD Take 30 mLs by mouth every 4 hours as needed for cough       LORazepam (ATIVAN) 2 MG/ML (HIGH CONC) solution Give 0.25 ml by mouth every 4 hours as needed for Agitation/anxiety       morphine sulfate HIGH CONCENTRATE (ROXANOL) 20 mg/mL (HIGH CONC) solution Take 5 mg by mouth every 2 hours as needed for pain       polyvinyl alcohol (LIQUIFILM TEARS) 1.4 % ophthalmic solution Place 1 drop into both eyes every 4 hours as needed for dry eyes       senna-docusate (SENOKOT-S;PERICOLACE) 8.6-50 MG per tablet Take 1 tablet by mouth daily every other day AND 1 tablet PRN       Medications reviewed:  Medications reconciled to facility chart and changes were made to reflect current medications as identified as above med list. Below are the changes that were made:   Medications stopped since last EPIC medication reconciliation:   There are no discontinued medications.    Medications started since last [facility] medication reconciliation:  Orders Placed This Encounter   Medications     guaiFENesin (ROBITUSSIN) 100 MG/5ML LIQD     Sig: Take 30 mLs by mouth every 4 hours as needed for cough     morphine sulfate HIGH CONCENTRATE (ROXANOL) 20 mg/mL (HIGH CONC) solution     Sig: Take 5 mg by mouth every 2 hours as needed for pain     LORazepam (ATIVAN) 2 MG/ML (HIGH CONC) solution     Sig: Give 0.25 ml by mouth every 4 hours as needed for Agitation/anxiety     Case Management:  I have reviewed the facility/SNF care plan/MDS which was done ., including the falls risk, nutrition and pain screening. I also reviewed the current immunizations, and preventive care..Future cancer screening is not clinically indicated secondary to age/goals of care Patient's desire to return to the community is not present. Current Level of Care is appropriate.    Advance Directive Discussion:    I reviewed the current advanced directives as  "reflected in EPIC, the POLST and the facility chart, and verified the congruency of orders. I contacted the first party Chantell  and discussed the plan of Care.  I did not due to cognitive impairment review the advance directives with the resident.     Team Discussion:  I communicated with the appropriate disciplines involved with the Plan of Care:   Nursing  ,    and Dietitian      Patient Goal:  Patient's goal is pain control and comfort.    Information reviewed:  Medications, vital signs, orders, and nursing notes.    ROS:  Unobtainable secondary to cognitive impairment.     Exam:  /72  Pulse 76  Temp 98.6  F (37  C)  Resp 18  Ht 5' 3\" (1.6 m)  Wt 101 lb 1.6 oz (45.9 kg)  SpO2 96%  BMI 17.91 kg/m2    GENERAL APPEARANCE:  Alert, in no distress  ENT:  Mouth and posterior oropharynx normal, moist mucous membranes, [place]  RESP:  respiratory effort and palpation of chest normal, lungs clear to auscultation   CV:  Palpation and auscultation of heart done , regular rate and rhythm, no murmur, rub, or gallop  ABDOMEN:  normal bowel sounds, soft, nontender, no hepatosplenomegaly or other masses  M/S:   Gait and station normal  Digits and nails normal  SKIN:  Inspection of skin and subcutaneous tissue baseline, Palpation of skin and subcutaneous tissue baseline  NEURO:   Cranial nerves 2-12 are normal tested and grossly at patient's baseline  PSYCH:  insight and judgement impaired, memory impaired , affect and mood normal     Lab/Diagnostic data:   CBC RESULTS:   Recent Labs   Lab Test 06/19/18 09/26/17   WBC  6.3  5.9   RBC  3.42*  3.63*   HGB  11.1*  11.3*   HCT  33.1*  34.5*   MCV  97  95   MCH  32.5  31.1   MCHC  33.5  32.8   RDW  13.2  14.6*   PLT  276  333       Last Basic Metabolic Panel:  Recent Labs   Lab Test 06/19/18 11/28/17   NA  129*  137   POTASSIUM  4.9  4.0   CHLORIDE  98  105   SAMSON  9.6  9.5   CO2  21*  25   BUN  36*  40*   CR  1.46*  1.68*   GLC  84  112       Liver Function " Studies -   Recent Labs   Lab Test  05/02/17   1820  03/21/17   0819   PROTTOTAL  7.5  5.3*   ALBUMIN  3.6  2.6*   BILITOTAL  0.4  0.6   ALKPHOS  147  58   AST  19  12   ALT  23  11       TSH   Date Value Ref Range Status   06/19/2018 1.55 0.30 - 5.00 uIU/mL Final   07/06/2017 2.80 0.30 - 5.00 uIU/mL Final       ASSESSMENT/PLAN  (I12.9,  N18.3) Benign hypertension with chronic kidney disease, stage III (H)  (primary encounter diagnosis)  Resident enrolled with hospice with POC focused on pain control and comfort. No longer receiving antihypertensive agents.     (G30.1,  F02.80) Late onset Alzheimer's disease without behavioral disturbance  No behaviors noted. Continue to anticipate needs. Chronic condition, ongoing decline expected.   -  Continue to provide redirection and reassurance as needed. Maintain safe living situation with goals focused on comfort and pain control.     (M15.0) Primary osteoarthritis involving multiple joints  Appears comfortable. Has acetaminophen and morphine.     (E03.4) Hypothyroidism due to acquired atrophy of thyroid  David longer receiving medication. No further lab draws 2/2 POC focused on pain control and comfort.     (K21.9) Gastroesophageal reflux disease, esophagitis presence not specified  Appears comfortable.     (Z51.5) Hospice care patient  Admitted 08.09.18 will POC focused on pain control and comfort. Notify NP with any concerns.       Electronically signed by:  BERNARD Peng CNP  [phone]

## 2018-11-09 NOTE — PROGRESS NOTES
[facility]    Chief Complaint   Patient presents with     [facility] Regulatory       [place] Medical Record Number:  [id number]  Place of Service where encounter took place:  [facility] ([facility]) [[id number]]    HPI:    Milagros Marie is a 89 year old  (9/17/1923), who is being seen today for a federally mandated E/M visit.  HPI information obtained from: facility chart records, facility staff, patient report and [facility] chart review.     Today's concerns are:    ICD-10-CM    1. Gastroesophageal reflux disease, esophagitis presence not specified K21.9    2. Benign hypertension with chronic kidney disease, stage III (H) I12.9     N18.3    3. Hypothyroidism due to acquired atrophy of thyroid E03.4    4. Hospice care patient Z51.5       Resident resting comfortably in room without signs or symptoms of discomfort. Was admitted with [facility] 08/2018 due to decline. Most medications discontinued and now focused on pain control and comfort. Family aware and ok with plan of care.     BP: 126-160/66-90 mmHg  HR: 64-91 bpm  Wt Readings from Last 5 Encounters:   11/09/18 90 lb (40.8 kg)   10/05/18 101 lb 1.6 oz (45.9 kg)   08/10/18 100 lb 14.4 oz (45.8 kg)   07/12/18 102 lb 11.2 oz (46.6 kg)   06/12/18 108 lb 4.8 oz (49.1 kg)       ALLERGIES: Sulfa drugs  PAST MEDICAL HISTORY:  has a past medical history of ACP (advance care planning); Dementia; DJD (degenerative joint disease); Essential hypertension, benign; Glaucoma; Homocysteinemia (H); HTN (hypertension); Osteopenia; Reflux; Renal failure (2008); Stenosis; Stress incontinence, female; and Unspecified hypothyroidism.  PAST SURGICAL HISTORY:  has a past surgical history that includes joint replacemtn, knee rt/lt; Hysterectomy; carpal tunnel release rt/lt; cataract iol, rt/lt; Blepharoplasty; Eye surgery; and Bladder surgery.  FAMILY HISTORY: family history includes Cancer in her sister; Cancer - colorectal in her sister; HEART  DISEASE in her father and mother.  SOCIAL HISTORY:  reports that she has never smoked. She has never used smokeless tobacco. She reports that she drinks alcohol. She reports that she does not use illicit drugs.    MEDICATIONS:  Current Outpatient Prescriptions   Medication Sig Dispense Refill     acetaminophen (ACETAMINOPHEN EXTRA STRENGTH) 500 MG tablet Take 1,000 mg by mouth 3 times daily        cephALEXin (KEFLEX) 500 MG capsule Take 2,000 mg by mouth as needed Administer one hour prior to dental procedure  99     COMBIGAN 0.2-0.5 % ophthalmic solution Place 1 drop Into the left eye 2 times daily  4     guaiFENesin (ROBITUSSIN) 100 MG/5ML LIQD Take 30 mLs by mouth every 4 hours as needed for cough       LORazepam (ATIVAN) 2 MG/ML (HIGH CONC) solution Give 0.5 ml by mouth every 4 hours as needed for Agitation/anxiety FOR 14 DAYS       morphine sulfate HIGH CONCENTRATE (ROXANOL) 20 mg/mL (HIGH CONC) solution Take 5 mg by mouth every 2 hours as needed for pain       polyvinyl alcohol (LIQUIFILM TEARS) 1.4 % ophthalmic solution Place 1 drop into both eyes every 4 hours as needed for dry eyes       senna-docusate (SENOKOT-S;PERICOLACE) 8.6-50 MG per tablet Take 1 tablet by mouth daily every other day AND 1 tablet PRN       Medications reviewed:  Medications reconciled to facility chart and changes were made to reflect current medications as identified as above med list. Below are the changes that were made:   Medications stopped since last EPIC medication reconciliation:   There are no discontinued medications.    Medications started since last [facility] medication reconciliation:  No orders of the defined types were placed in this encounter.        Case Management:  I have reviewed the care plan and MDS and do agree with the plan. Patient's desire to return to the community is not present.  Information reviewed:  Medications, vital signs, orders, and nursing notes.    ROS:  Unobtainable secondary to cognitive impairment.  "    Exam:  Vitals: /90  Pulse 91  Temp 97.8  F (36.6  C)  Resp 16  Ht 5' 3\" (1.6 m)  Wt 90 lb (40.8 kg)  SpO2 96%  BMI 15.94 kg/m2  BMI= Body mass index is 15.94 kg/(m^2).  GENERAL APPEARANCE:  Alert  ENT:  Mouth and posterior oropharynx normal, moist mucous membranes  RESP:  respiratory effort and palpation of chest normal, lungs clear to auscultation , no respiratory distress  CV:  Palpation and auscultation of heart done , regular rate and rhythm, no murmur, rub, or gallop  M/S:   Gait and station normal  Digits and nails normal  SKIN:  Inspection of skin and subcutaneous tissue baseline, Palpation of skin and subcutaneous tissue baseline  NEURO:   Cranial nerves 2-12 are normal tested and grossly at patient's baseline  PSYCH:  insight and judgement impaired, memory impaired , affect and mood normal    Lab/Diagnostic data:     CBC RESULTS:   Recent Labs   Lab Test 06/19/18 09/26/17   WBC  6.3  5.9   RBC  3.42*  3.63*   HGB  11.1*  11.3*   HCT  33.1*  34.5*   MCV  97  95   MCH  32.5  31.1   MCHC  33.5  32.8   RDW  13.2  14.6*   PLT  276  333       Last Basic Metabolic Panel:  Recent Labs   Lab Test 06/19/18 11/28/17   NA  129*  137   POTASSIUM  4.9  4.0   CHLORIDE  98  105   SAMSON  9.6  9.5   CO2  21*  25   BUN  36*  40*   CR  1.46*  1.68*   GLC  84  112       TSH   Date Value Ref Range Status   06/19/2018 1.55 0.30 - 5.00 uIU/mL Final   07/06/2017 2.80 0.30 - 5.00 uIU/mL Final       ASSESSMENT/PLAN  (K21.9) Gastroesophageal reflux disease, esophagitis presence not specified  (primary encounter diagnosis)  No signs or symptoms noted. Comfort medications available.     (I12.9,  N18.3) Benign hypertension with chronic kidney disease, stage III (H)  Chronic. Currently enrolled with hospice with POC comfort focused. No longer taking antihypertensive agents.     (E03.4) Hypothyroidism due to acquired atrophy of thyroid  No longer taking synthroid. No further lab draws at this time. Hospice patient with POC " comfort focused.     (Z51.5) Hospice care patient  Enrolled with [facility] 08/2018.   -Very little oral intake.   -No further lab draws. Comfort medications only.   -Notify NP of changes.       Electronically signed by:  BERNARD Peng [facility]

## 2023-05-04 NOTE — ED AVS SNAPSHOT
Emergency Department    [address] [place] [id number]    Phone:  [phone]    Fax:  [phone]                                       Milagros Marie   MRN: [id number]    Department:   Emergency Department   Date of Visit:  3/28/2017           After Visit Summary Signature Page     I have received my discharge instructions, and my questions have been answered. I have discussed any challenges I see with this plan with the nurse or doctor.    ..........................................................................................................................................  Patient/Patient Representative Signature      ..........................................................................................................................................  Patient Representative Print Name and Relationship to Patient    ..................................................               ................................................  Date                                            Time    ..........................................................................................................................................  Reviewed by Signature/Title    ...................................................              ..............................................  Date                                                            Time           Graft Donor Site Will Heal By Secondary Intention: No